# Patient Record
Sex: MALE | Race: WHITE | Employment: FULL TIME | URBAN - METROPOLITAN AREA
[De-identification: names, ages, dates, MRNs, and addresses within clinical notes are randomized per-mention and may not be internally consistent; named-entity substitution may affect disease eponyms.]

---

## 2017-05-10 ENCOUNTER — HOSPITAL ENCOUNTER (OUTPATIENT)
Dept: GENERAL RADIOLOGY | Age: 67
Discharge: HOME | End: 2017-05-10
Payer: MEDICARE

## 2017-05-10 DIAGNOSIS — M54.2 NECK PAIN: ICD-10-CM

## 2017-05-10 PROCEDURE — 72050 X-RAY EXAM NECK SPINE 4/5VWS: CPT

## 2017-05-10 NOTE — PROGRESS NOTES
Please tell the patient that the x-rays show some mild arthritis but no other acute problems. If he continues to have neck pain let me know and I can send him to a spine specialist such as Dr. Alex Michel.   He may be a candidate for some type of injection if the medication I gave him earlier did not help

## 2018-06-12 PROBLEM — E66.01 SEVERE OBESITY (BMI 35.0-39.9): Status: ACTIVE | Noted: 2018-06-12

## 2018-06-18 PROBLEM — K80.20 GALLSTONES: Status: ACTIVE | Noted: 2018-06-18

## 2018-06-22 ENCOUNTER — HOSPITAL ENCOUNTER (OUTPATIENT)
Dept: ULTRASOUND IMAGING | Age: 68
Discharge: HOME | End: 2018-06-22
Attending: SURGERY
Payer: MEDICARE

## 2018-06-22 DIAGNOSIS — R10.11 RUQ PAIN: ICD-10-CM

## 2018-06-22 PROCEDURE — 76700 US EXAM ABDOM COMPLETE: CPT

## 2018-06-27 PROBLEM — K80.10 CHRONIC CALCULOUS CHOLECYSTITIS: Status: ACTIVE | Noted: 2018-06-27

## 2018-06-29 ENCOUNTER — HOSPITAL ENCOUNTER (OUTPATIENT)
Dept: SURGERY | Age: 68
Discharge: HOME | End: 2018-06-29

## 2018-06-29 VITALS
HEIGHT: 72 IN | SYSTOLIC BLOOD PRESSURE: 148 MMHG | WEIGHT: 269 LBS | OXYGEN SATURATION: 94 % | HEART RATE: 77 BPM | RESPIRATION RATE: 18 BRPM | TEMPERATURE: 97.8 F | BODY MASS INDEX: 36.44 KG/M2 | DIASTOLIC BLOOD PRESSURE: 70 MMHG

## 2018-06-29 NOTE — PERIOP NOTES
Patient verified name, , and surgery as listed in [facility]. Patient provided medical/health information and PTA medications to the best of their ability. TYPE  CASE:2  Orders per surgeon: Received and dated 18. Labs per surgeon:cbc,cmp. Results: see [facility] results from 18  Labs per anesthesia protocol: see above. EKG  :  None per protocol    Patient provided with and instructed on education handouts including Guide to Surgery, blood transfusions, pain management, and hand hygiene for the family and community, and [facility] brochure.     hibiclens and instructions given per hospital policy. Instructed patient to continue previous medications as prescribed prior to surgery unless otherwise directed and to take the following medications the day of surgery according to anesthesia guidelines : celexa, metoprolol and prilosec . Instructed patient to hold  the following medications: all vitamins supplements and nsaids. Original medication prescription bottles not visualized during patient appointment. Patient teach back successful and patient demonstrates knowledge of instruction.

## 2018-07-05 ENCOUNTER — ANESTHESIA EVENT (OUTPATIENT)
Dept: SURGERY | Age: 68
End: 2018-07-05
Payer: MEDICARE

## 2018-07-06 ENCOUNTER — HOSPITAL ENCOUNTER (OUTPATIENT)
Age: 68
Setting detail: OUTPATIENT SURGERY
Discharge: HOME | End: 2018-07-06
Attending: SURGERY | Admitting: SURGERY
Payer: MEDICARE

## 2018-07-06 ENCOUNTER — ANESTHESIA (OUTPATIENT)
Dept: SURGERY | Age: 68
End: 2018-07-06
Payer: MEDICARE

## 2018-07-06 VITALS
OXYGEN SATURATION: 91 % | HEART RATE: 66 BPM | RESPIRATION RATE: 21 BRPM | WEIGHT: 261 LBS | DIASTOLIC BLOOD PRESSURE: 83 MMHG | HEIGHT: 72 IN | TEMPERATURE: 98.4 F | SYSTOLIC BLOOD PRESSURE: 154 MMHG | BODY MASS INDEX: 35.35 KG/M2

## 2018-07-06 PROCEDURE — 74011250636 HC RX REV CODE- 250/636

## 2018-07-06 PROCEDURE — 77030008467 HC STPLR SKN COVD -B: Performed by: SURGERY

## 2018-07-06 PROCEDURE — 77030031139 HC SUT VCRL2 J&J -A: Performed by: SURGERY

## 2018-07-06 PROCEDURE — 77030035048 HC TRCR ENDOSC OPTCL COVD -B: Performed by: SURGERY

## 2018-07-06 PROCEDURE — 77030020782 HC GWN BAIR PAWS FLX 3M -B: Performed by: ANESTHESIOLOGY

## 2018-07-06 PROCEDURE — 77030032490 HC SLV COMPR SCD KNE COVD -B: Performed by: SURGERY

## 2018-07-06 PROCEDURE — 77030008703 HC TU ET UNCUF COVD -A: Performed by: ANESTHESIOLOGY

## 2018-07-06 PROCEDURE — 77030035051: Performed by: SURGERY

## 2018-07-06 PROCEDURE — 77030021158 HC TRCR BLN GELPRT AMR -B: Performed by: SURGERY

## 2018-07-06 PROCEDURE — 77030011640 HC PAD GRND REM COVD -A: Performed by: SURGERY

## 2018-07-06 PROCEDURE — 77030000038 HC TIP SCIS LAPSCP SURI -B: Performed by: SURGERY

## 2018-07-06 PROCEDURE — 77030018836 HC SOL IRR NACL ICUM -A: Performed by: SURGERY

## 2018-07-06 PROCEDURE — 74011250636 HC RX REV CODE- 250/636: Performed by: ANESTHESIOLOGY

## 2018-07-06 PROCEDURE — 77030018876 HC APPL CLP LIG4 COVD -B: Performed by: SURGERY

## 2018-07-06 PROCEDURE — 77030035044 HC TRCR ENDOSC VRSPRT BLDLSS COVD -C: Performed by: SURGERY

## 2018-07-06 PROCEDURE — 77030008477 HC STYL SATN SLP COVD -A: Performed by: ANESTHESIOLOGY

## 2018-07-06 PROCEDURE — 74011000250 HC RX REV CODE- 250

## 2018-07-06 PROCEDURE — 74011250637 HC RX REV CODE- 250/637: Performed by: ANESTHESIOLOGY

## 2018-07-06 PROCEDURE — 88304 TISSUE EXAM BY PATHOLOGIST: CPT | Performed by: SURGERY

## 2018-07-06 PROCEDURE — 76010000149 HC OR TIME 1 TO 1.5 HR: Performed by: SURGERY

## 2018-07-06 PROCEDURE — 77030025088 HC APPL CLP LIG 1 COVD -B: Performed by: SURGERY

## 2018-07-06 PROCEDURE — 76210000020 HC REC RM PH II FIRST 0.5 HR: Performed by: SURGERY

## 2018-07-06 PROCEDURE — 76060000033 HC ANESTHESIA 1 TO 1.5 HR: Performed by: SURGERY

## 2018-07-06 PROCEDURE — 76210000016 HC OR PH I REC 1 TO 1.5 HR: Performed by: SURGERY

## 2018-07-06 PROCEDURE — 77030008756 HC TU IRR SUC STRY -B: Performed by: SURGERY

## 2018-07-06 PROCEDURE — 74011250636 HC RX REV CODE- 250/636: Performed by: SURGERY

## 2018-07-06 PROCEDURE — 77030008522 HC TBNG INSUF LAPRO STRY -B: Performed by: SURGERY

## 2018-07-06 PROCEDURE — 74011000250 HC RX REV CODE- 250: Performed by: SURGERY

## 2018-07-06 RX ORDER — SODIUM CHLORIDE 0.9 % (FLUSH) 0.9 %
5-10 SYRINGE (ML) INJECTION AS NEEDED
Status: DISCONTINUED | OUTPATIENT
Start: 2018-07-06 | End: 2018-07-06 | Stop reason: HOSPADM

## 2018-07-06 RX ORDER — CEFAZOLIN SODIUM/WATER 2 G/20 ML
2 SYRINGE (ML) INTRAVENOUS ONCE
Status: COMPLETED | OUTPATIENT
Start: 2018-07-06 | End: 2018-07-06

## 2018-07-06 RX ORDER — MIDAZOLAM HYDROCHLORIDE 1 MG/ML
2 INJECTION, SOLUTION INTRAMUSCULAR; INTRAVENOUS
Status: DISCONTINUED | OUTPATIENT
Start: 2018-07-06 | End: 2018-07-06 | Stop reason: HOSPADM

## 2018-07-06 RX ORDER — GLYCOPYRROLATE 0.2 MG/ML
INJECTION INTRAMUSCULAR; INTRAVENOUS AS NEEDED
Status: DISCONTINUED | OUTPATIENT
Start: 2018-07-06 | End: 2018-07-06 | Stop reason: HOSPADM

## 2018-07-06 RX ORDER — FENTANYL CITRATE 50 UG/ML
100 INJECTION, SOLUTION INTRAMUSCULAR; INTRAVENOUS ONCE
Status: DISCONTINUED | OUTPATIENT
Start: 2018-07-06 | End: 2018-07-06 | Stop reason: HOSPADM

## 2018-07-06 RX ORDER — LIDOCAINE HYDROCHLORIDE 10 MG/ML
0.1 INJECTION INFILTRATION; PERINEURAL AS NEEDED
Status: DISCONTINUED | OUTPATIENT
Start: 2018-07-06 | End: 2018-07-06 | Stop reason: HOSPADM

## 2018-07-06 RX ORDER — ONDANSETRON 2 MG/ML
INJECTION INTRAMUSCULAR; INTRAVENOUS AS NEEDED
Status: DISCONTINUED | OUTPATIENT
Start: 2018-07-06 | End: 2018-07-06 | Stop reason: HOSPADM

## 2018-07-06 RX ORDER — BUPIVACAINE HYDROCHLORIDE 2.5 MG/ML
INJECTION, SOLUTION EPIDURAL; INFILTRATION; INTRACAUDAL AS NEEDED
Status: DISCONTINUED | OUTPATIENT
Start: 2018-07-06 | End: 2018-07-06 | Stop reason: HOSPADM

## 2018-07-06 RX ORDER — ROCURONIUM BROMIDE 10 MG/ML
INJECTION, SOLUTION INTRAVENOUS AS NEEDED
Status: DISCONTINUED | OUTPATIENT
Start: 2018-07-06 | End: 2018-07-06 | Stop reason: HOSPADM

## 2018-07-06 RX ORDER — CEFAZOLIN SODIUM IN 0.9 % NACL 2 G/50 ML
2 INTRAVENOUS SOLUTION, PIGGYBACK (ML) INTRAVENOUS ONCE
Status: CANCELLED | OUTPATIENT
Start: 2018-07-06 | End: 2018-07-06

## 2018-07-06 RX ORDER — FAMOTIDINE 20 MG/1
20 TABLET, FILM COATED ORAL ONCE
Status: COMPLETED | OUTPATIENT
Start: 2018-07-06 | End: 2018-07-06

## 2018-07-06 RX ORDER — NALOXONE HYDROCHLORIDE 0.4 MG/ML
0.1 INJECTION, SOLUTION INTRAMUSCULAR; INTRAVENOUS; SUBCUTANEOUS AS NEEDED
Status: DISCONTINUED | OUTPATIENT
Start: 2018-07-06 | End: 2018-07-06 | Stop reason: HOSPADM

## 2018-07-06 RX ORDER — OXYCODONE HYDROCHLORIDE 5 MG/1
5 TABLET ORAL
Status: DISCONTINUED | OUTPATIENT
Start: 2018-07-06 | End: 2018-07-06 | Stop reason: HOSPADM

## 2018-07-06 RX ORDER — NEOSTIGMINE METHYLSULFATE 1 MG/ML
INJECTION INTRAVENOUS AS NEEDED
Status: DISCONTINUED | OUTPATIENT
Start: 2018-07-06 | End: 2018-07-06 | Stop reason: HOSPADM

## 2018-07-06 RX ORDER — PROPOFOL 10 MG/ML
INJECTION, EMULSION INTRAVENOUS AS NEEDED
Status: DISCONTINUED | OUTPATIENT
Start: 2018-07-06 | End: 2018-07-06 | Stop reason: HOSPADM

## 2018-07-06 RX ORDER — FENTANYL CITRATE 50 UG/ML
INJECTION, SOLUTION INTRAMUSCULAR; INTRAVENOUS AS NEEDED
Status: DISCONTINUED | OUTPATIENT
Start: 2018-07-06 | End: 2018-07-06 | Stop reason: HOSPADM

## 2018-07-06 RX ORDER — SODIUM CHLORIDE 9 MG/ML
25 INJECTION, SOLUTION INTRAVENOUS CONTINUOUS
Status: DISCONTINUED | OUTPATIENT
Start: 2018-07-06 | End: 2018-07-06 | Stop reason: HOSPADM

## 2018-07-06 RX ORDER — HYDROCODONE BITARTRATE AND ACETAMINOPHEN 7.5; 325 MG/1; MG/1
1 TABLET ORAL AS NEEDED
Status: DISCONTINUED | OUTPATIENT
Start: 2018-07-06 | End: 2018-07-06 | Stop reason: HOSPADM

## 2018-07-06 RX ORDER — SODIUM CHLORIDE 0.9 % (FLUSH) 0.9 %
5-10 SYRINGE (ML) INJECTION EVERY 8 HOURS
Status: DISCONTINUED | OUTPATIENT
Start: 2018-07-06 | End: 2018-07-06 | Stop reason: HOSPADM

## 2018-07-06 RX ORDER — EPHEDRINE SULFATE 50 MG/ML
INJECTION, SOLUTION INTRAVENOUS AS NEEDED
Status: DISCONTINUED | OUTPATIENT
Start: 2018-07-06 | End: 2018-07-06 | Stop reason: HOSPADM

## 2018-07-06 RX ORDER — DEXAMETHASONE SODIUM PHOSPHATE 4 MG/ML
INJECTION, SOLUTION INTRA-ARTICULAR; INTRALESIONAL; INTRAMUSCULAR; INTRAVENOUS; SOFT TISSUE AS NEEDED
Status: DISCONTINUED | OUTPATIENT
Start: 2018-07-06 | End: 2018-07-06 | Stop reason: HOSPADM

## 2018-07-06 RX ORDER — CEFAZOLIN SODIUM/WATER 2 G/20 ML
3 SYRINGE (ML) INTRAVENOUS ONCE
Status: DISCONTINUED | OUTPATIENT
Start: 2018-07-06 | End: 2018-07-06

## 2018-07-06 RX ORDER — LIDOCAINE HYDROCHLORIDE 20 MG/ML
INJECTION, SOLUTION EPIDURAL; INFILTRATION; INTRACAUDAL; PERINEURAL AS NEEDED
Status: DISCONTINUED | OUTPATIENT
Start: 2018-07-06 | End: 2018-07-06 | Stop reason: HOSPADM

## 2018-07-06 RX ORDER — HYDROMORPHONE HYDROCHLORIDE 2 MG/ML
0.5 INJECTION, SOLUTION INTRAMUSCULAR; INTRAVENOUS; SUBCUTANEOUS
Status: DISCONTINUED | OUTPATIENT
Start: 2018-07-06 | End: 2018-07-06 | Stop reason: HOSPADM

## 2018-07-06 RX ORDER — SODIUM CHLORIDE, SODIUM LACTATE, POTASSIUM CHLORIDE, CALCIUM CHLORIDE 600; 310; 30; 20 MG/100ML; MG/100ML; MG/100ML; MG/100ML
100 INJECTION, SOLUTION INTRAVENOUS CONTINUOUS
Status: DISCONTINUED | OUTPATIENT
Start: 2018-07-06 | End: 2018-07-06 | Stop reason: HOSPADM

## 2018-07-06 RX ADMIN — Medication 2 G: at 09:45

## 2018-07-06 RX ADMIN — ONDANSETRON 4 MG: 2 INJECTION INTRAMUSCULAR; INTRAVENOUS at 10:00

## 2018-07-06 RX ADMIN — HYDROCODONE BITARTRATE AND ACETAMINOPHEN 1 TABLET: 7.5; 325 TABLET ORAL at 11:09

## 2018-07-06 RX ADMIN — DEXAMETHASONE SODIUM PHOSPHATE 4 MG: 4 INJECTION, SOLUTION INTRA-ARTICULAR; INTRALESIONAL; INTRAMUSCULAR; INTRAVENOUS; SOFT TISSUE at 09:55

## 2018-07-06 RX ADMIN — FENTANYL CITRATE 100 MCG: 50 INJECTION, SOLUTION INTRAMUSCULAR; INTRAVENOUS at 09:37

## 2018-07-06 RX ADMIN — PROPOFOL 150 MG: 10 INJECTION, EMULSION INTRAVENOUS at 09:38

## 2018-07-06 RX ADMIN — GLYCOPYRROLATE 0.4 MG: 0.2 INJECTION INTRAMUSCULAR; INTRAVENOUS at 10:35

## 2018-07-06 RX ADMIN — EPHEDRINE SULFATE 10 MG: 50 INJECTION, SOLUTION INTRAVENOUS at 09:50

## 2018-07-06 RX ADMIN — HYDROMORPHONE HYDROCHLORIDE 0.5 MG: 2 INJECTION, SOLUTION INTRAMUSCULAR; INTRAVENOUS; SUBCUTANEOUS at 11:10

## 2018-07-06 RX ADMIN — NEOSTIGMINE METHYLSULFATE 3 MG: 1 INJECTION INTRAVENOUS at 10:35

## 2018-07-06 RX ADMIN — FAMOTIDINE 20 MG: 20 TABLET ORAL at 08:17

## 2018-07-06 RX ADMIN — SODIUM CHLORIDE, SODIUM LACTATE, POTASSIUM CHLORIDE, AND CALCIUM CHLORIDE 100 ML/HR: 600; 310; 30; 20 INJECTION, SOLUTION INTRAVENOUS at 08:17

## 2018-07-06 RX ADMIN — FENTANYL CITRATE 50 MCG: 50 INJECTION, SOLUTION INTRAMUSCULAR; INTRAVENOUS at 10:04

## 2018-07-06 RX ADMIN — ROCURONIUM BROMIDE 50 MG: 10 INJECTION, SOLUTION INTRAVENOUS at 09:39

## 2018-07-06 RX ADMIN — LIDOCAINE HYDROCHLORIDE 80 MG: 20 INJECTION, SOLUTION EPIDURAL; INFILTRATION; INTRACAUDAL; PERINEURAL at 09:38

## 2018-07-06 NOTE — ANESTHESIA PREPROCEDURE EVALUATION
Anesthetic History               Review of Systems / Medical History  Patient summary reviewed    Pulmonary                   Neuro/Psych              Cardiovascular    Hypertension          CAD (\"minimal\" per patient, cath 12 years ago)  Pertinent negatives: No past MI and angina  Exercise tolerance: >4 METS     GI/Hepatic/Renal     GERD: well controlled           Endo/Other        Obesity     Other Findings            Physical Exam    Airway  Mallampati: III    Neck ROM: decreased range of motion, short neck        Cardiovascular               Dental    Dentition: Poor dentition     Pulmonary                 Abdominal         Other Findings            Anesthetic Plan    ASA: 3  Anesthesia type: general            Anesthetic plan and risks discussed with: Patient

## 2018-07-06 NOTE — ANESTHESIA POSTPROCEDURE EVALUATION
Post-Anesthesia Evaluation and Assessment    Patient: Shikha Ervin MRN: [id number]  SSN: [id number]    YOB: 1950  Age: 75 y.o. Sex: male       Cardiovascular Function/Vital Signs  Visit Vitals    /87    Pulse (!) 54    Temp 36.2 °C (97.1 °F)    Resp 14    Ht 6' (1.829 m)    Wt 118.4 kg (261 lb)    SpO2 94%    BMI 35.4 kg/m2       Patient is status post general anesthesia for Procedure(s):  CHOLECYSTECTOMY LAPAROSCOPIC. Nausea/Vomiting: Mild    Postoperative hydration reviewed and adequate. Pain:  Pain Scale 1: Numeric (0 - 10) (07/06/18 1110)  Pain Intensity 1: 8 (07/06/18 1110)   Managed    Neurological Status:   Neuro (WDL): Within Defined Limits (07/06/18 1100)  Neuro  LUE Motor Response: Purposeful (07/06/18 1100)  LLE Motor Response: Purposeful (07/06/18 1100)  RUE Motor Response: Purposeful (07/06/18 1100)  RLE Motor Response: Purposeful (07/06/18 1100)   At baseline    Mental Status and Level of Consciousness: Alert and oriented     Pulmonary Status:   O2 Device: Nasal cannula (07/06/18 1100)   Adequate oxygenation and airway patent    Complications related to anesthesia: None    Post-anesthesia assessment completed.  No concerns    Signed By: Paul Hensley MD     July 6, 2018

## 2018-07-06 NOTE — OP NOTES
[address] [facility]  [phone]    OPERATVE REPORT    Name: Pamela Cabrera     Date of Surgery: 7/6/2018  Med Record Number: [id number]   Age: 75 y.o. Sex: male   Pre-operative Diagnosis: Chronic Calculous Cholecystitis  Post-operative Diagnosis: same  Procedure: Laparoscopic Cholecystectomy  Surgeon: Miguel Guevara MD  Asistants: none  Anesthesia:  General  Complications: none  Specimen: gallbladder to path  Estimated Blood Loss: <30cc    Procedure Description:   The risks, benefits, potential complications, treatment options, and expected outcomes were discussed with the patient pre-operatively. The patient voiced understanding and gave informed consent preoperatively. The patient was taken to the Operating Room, and the [facility] time-out protocol checklist was followed. After the induction of adequate anesthesia, the abdomen was prepped and draped in the usual sterile fashion. Preoperative antibiotics were given. A sub umbilical incision was made and a cut down approach was used to place a blunt Micah trochar under direct vision. After adequate pneumoperitioneum, two 5mm static and dynamic retraction ports were placed and an 11mm trochar also placed, all in the usual locations. The gallbladder was retracted but it was distended and could not be retracted adequately to visualize the distal aspect of its wall as a result of too much intraabd adipose which required and additional 5mm trochar and liver retractor to hold out of the way for exposure. The gallbladder was fragile as well and we had to put a lot of retraction pressure on it for exposure which resulted in a small disruption of its apex which was controlled with suction irrigation. Despite this, the gallbladder sathin, fragile, and inflammatory adhesions to the inferior aspect of the gallbladder were taken down.   Careful and tedious dissection was performed to free up the cystic duct and artery from the surrounding tissues. A clear window was created between the inferior aspect of the gallbladder wall, the cystic duct, and the cystic artery. The cystic duct could clearly be seen to enter the gallbladder and the cystic artery seen to traverse on the gallbladder wall toward the fundus of the gallbladder. The critical view of safety was achieved. We placed 4 clips on the distal cystic duct (patient side) and 2 clips on the proximal (specimen side) of the cystic duct. We needed a large clip applier because the cystic duct was dilated. We then placed 2 clips on the proximal cystic artery and 2 clips on the distal cystic artery. Both structures were then divided. The cystic artery could be seen to pulsate at its clipped end as usual.  The gallbladder was the removed from its attachments to the liver. Small venous tributaries were clipped as needed as dissection was carried up toward the gallbladder fundus. The gallbladder was retracted out through the umbilical trochar suite with the camera placed temporarily through the epigastric trochar site. The gallbladder fossa was inspected. No bile leaks were seen, the clips on the artery and duct were intact and hemostatsis confirmed. Marcaine was infiltrated into the preperitoneal tissues around each trochar site. The fascia of the umbilical incision was closed with interrupted 0 vicryl suture. Clips were placed to close the skin incisions. The wounds were dressed sterilely with telfa and tegaderm, All sponge, instruments, and needle counts were correct. The patient was taken to recovery in good condition.     Shawn Thrasher MD, FACS

## 2018-07-06 NOTE — H&P (VIEW-ONLY)
.mthop  H&P/Consult Note/Progress Note/Office Note:   Michelle Wells  MRN: [id number]  :1950  Age:72 y.o.    HPI: Michelle Wells is a 75 y.o. male who was referred for evaluation of gallstones. He reports a 3 year h/o of intermittent episodes of RUQ pain which is worse recently. He reports 3 episodes of RUQ pain recently. He is on standing dose of omeprazole for remote history of gastritis and reports this did not alleviate his symptoms or prevent them from happening. Additional antacids are not helping. He denied any associated nausea vomiting fevers or chills. He is not on blood thinners. He had a prior umbilical hernia repair at [facility] in approximately  (He does not now if mesh was used). He reported a history of peptic ulcer disease from age 14. He has seen gastroenterology but does not remember the name of his gastroenterologist.   He reported a prior unremarkable colonoscopy but reports this is more than 5 years ago. He reported a prior CT from approximately  at [facility] which identified gallstones. 16 U/S abd  Hx:  Abd distention, hx gallstones     The liver is slightly echogenic. There are no discrete lesions in the visualized portions of the liver, pancreas, or spleen. There is no bile duct  dilatation. The common bile duct measures 6 mm. There are multiple small stones in the gallbladder. .  There is no significant wall thickening. The gallbladder is not distended.     The right kidney measures 11.7 cm in length. The left kidney measures 12.9 cm. There is no hydronephrosis. There is no evidence of a renal mass. There is no ascites. The aorta and inferior vena cava are normal in caliber.     IMPRESSION: Cholelithiasis      18 RUQ U/S  Hx: RUQ pain, h/o gallstones.     FINDINGS: Pancreas is grossly unremarkable although the tail is partially obscured by overlying bowel gas. Aorta is normal caliber, 2.0 cm. The IVC is patent. The normal sized spleen has a homogenous echotexture and measures 12.3 cm. Left kidney contains a 15 mm simple appearing cyst, otherwise unremarkable without hydronephrosis and measures 12.1 cm. Right kidney is unremarkable without hydronephrosis and measures 12 cm. Renal echogenicity is normal, the right kidney is hypoechoic to the liver. The intrahepatic biliary tree is not dilated. There is hepatopetal flow in the portal vein. No gross focal parenchymal abnormality identified within the liver. The gallbladder wall is thickened, 3 - 6 mm. Multiple small echogenic shadowing mobile gallstones. Negative Izaguirre's sign. The common bile duct is not dilated 6 mm.      IMPRESSION: Cholelithiasis and gallbladder wall thickening. No acute cholecystitis or biliary tree obstruction      Past Medical History:   Diagnosis Date    CAD (coronary artery disease) 12/19/2012    ED (erectile dysfunction) 12/19/2012    GERD (gastroesophageal reflux disease) 12/19/2012    HLD (hyperlipidemia) 12/19/2012    HTN (hypertension) 12/19/2012     No past surgical history on file. Current Outpatient Prescriptions   Medication Sig    citalopram (CELEXA) 20 mg tablet Take 1 Tab by mouth daily.  clonazePAM (KLONOPIN) 1 mg tablet Take 1 Tab by mouth three (3) times daily. Max Daily Amount: 3 mg.  lisinopril-hydroCHLOROthiazide (PRINZIDE, ZESTORETIC) 20-25 mg per tablet Take 1 Tab by mouth daily.  metoprolol succinate (TOPROL-XL) 100 mg tablet Take 1 Tab by mouth daily.  omeprazole (PRILOSEC) 40 mg capsule Take 1 Cap by mouth daily.  tamsulosin (FLOMAX) 0.4 mg capsule Take 1 Cap by mouth daily.  simvastatin (ZOCOR) 40 mg tablet Take 1 Tab by mouth nightly. No current facility-administered medications for this visit. Review of patient's allergies indicates no known allergies.   Social History     Social History    Marital status:      Spouse name: N/A    Number of children: N/A    Years of education: N/A     Social History Main Topics    Smoking status: Never Smoker    Smokeless tobacco: Never Used    Alcohol use No    Drug use: No    Sexual activity: Not Asked     Other Topics Concern    None     Social History Narrative     History   Smoking Status    Never Smoker   Smokeless Tobacco    Never Used     Family History   Problem Relation Age of Onset    Hypertension Mother     Cancer Father      Skin    Diabetes Other      Cousin     ROS: The patient has no difficulty with chest pain or shortness of breath. No fever or chills. Comprehensive review of systems was otherwise unremarkable except as noted above. Physical Exam:   There were no vitals taken for this visit. Constitutional: Alert, oriented, cooperative patient in no acute distress; appears stated age    Eyes:Sclera are clear. EOMs intact  ENMT: no external lesions gross hearing normal; no obvious neck masses, no ear or lip lesions, nares normal  CV: RRR. Normal perfusion  Resp: No JVD. Breathing is  non-labored; no audible wheezing. GI: soft and non-distended; obese     Musculoskeletal: unremarkable with normal function. No embolic signs or cyanosis. Neuro:  Oriented; moves all 4; no focal deficits  Psychiatric: normal affect and mood, no memory impairment    Recent vitals (if inpt):  @IPVITALS(24:)@    Labs:  No results for input(s): WBC, HGB, PLT, NA, K, CL, CO2, BUN, CREA, GLU, PTP, INR, APTT, TBIL, TBILI, CBIL, SGOT, GPT, ALT, AP, AML, LPSE, LCAD, NH4, TROPT, TROIQ, PCO2, PO2, HCO3, HGBEXT, PLTEXT, HGBEXT, PLTEXT in the last 72 hours.     No lab exists for component:  PH, INREXT, INREXT    Lab Results   Component Value Date/Time    WBC 8.7 06/12/2018 02:03 PM    HGB 16.9 06/12/2018 02:03 PM    PLATELET 910 01/30/4242 02:03 PM    Sodium 138 06/12/2018 02:03 PM    Potassium 4.4 06/12/2018 02:03 PM    Chloride 99 06/12/2018 02:03 PM    CO2 24 06/12/2018 02:03 PM    BUN 18 06/12/2018 02:03 PM    Creatinine 0.91 06/12/2018 02:03 PM    Glucose 89 06/12/2018 02:03 PM    Bilirubin, total 0.6 06/12/2018 02:03 PM    AST (SGOT) 21 06/12/2018 02:03 PM    ALT (SGPT) 23 06/12/2018 02:03 PM    Alk. phosphatase 78 06/12/2018 02:03 PM       I reviewed recent labs and recent radiologic studies. I independently reviewed radiology images for studies I described above or studies I have ordered. Admission date (for inpatients): (Not on file)   [unfilled]  [unfilled]    ASSESSMENT/PLAN:  Problem List  Date Reviewed: 6/26/2018          Codes Class Noted    Gallstones ICD-10-CM: K80.20  ICD-9-CM: 574.20  6/18/2018        Severe obesity (BMI 35.0-39.9) ([facility] [address] ICD-10-CM: E66.01  ICD-9-CM: 278.01  6/12/2018        HLD (hyperlipidemia) ICD-10-CM: E78.5  ICD-9-CM: 272.4  12/19/2012        HTN (hypertension) ICD-10-CM: I10  ICD-9-CM: 401.9  12/19/2012        CAD (coronary artery disease) ICD-10-CM: I25.10  ICD-9-CM: 414.00  12/19/2012        ED (erectile dysfunction) ICD-10-CM: N52.9  ICD-9-CM: 607.84  12/19/2012        GERD (gastroesophageal reflux disease) ICD-10-CM: K21.9  ICD-9-CM: 530.81  12/19/2012            Active Problems:    * No active hospital problems. *       I recommended weight loss for BMI >36    He has signs/symptoms c/w chronic calculous cholecystitis. I discussed the patient's condition with the patient at length and treatment options. I discussed risks of surgery (laparoscopic cholecystectomy) in language the patient could understand   including bleeding, infection, bile leak, injury to bowel or bile ducts, blood clots, risks of anesthesia, etc.... The patient voiced understanding of all this and all questions were answered. Alternatives to surgery were discussed also and risks of the alternatives. The patient requested that we proceed with surgery. Informed consent was obtained. .      We will schedule lap cholecystectomy for him soon. Request Nguyen Op Note - Is there mesh?       Signed:  Todd Lino MD,  FACS

## 2018-07-06 NOTE — IP AVS SNAPSHOT
[address] [address] 
[phone] Patient: Polina Jeffrey MRN: [id number] :1950 About your hospitalization You were admitted on:  2018 You last received care in the:  [facility] PACU You were discharged on:  2018 Why you were hospitalized Your primary diagnosis was:  Not on File Follow-up Information Follow up With Details Comments Contact Info Stacy Rodriguez MD   [address] 
[phone] Tasha Marie MD Follow up on 2018 10 am [address] 
[address] [facility] [address] 
[phone] Your Scheduled Appointments 2018 10:00 AM EDT Nurse Visit with [facility] NURSE ONLY [facility] ([facility]) Britney Danielle [address] [place] [address] [facility]  
[phone] 2018 11:15 AM EDT Global Post Op with Tasha Marie MD  
[facility] ([facility]) Britney Danielle [address] [place] [address] [facility]  
[phone] Discharge Orders None A check kayley indicates which time of day the medication should be taken. My Medications CHANGE how you take these medications Instructions Each Dose to Equal  
 Morning Noon Evening Bedtime  
 metoprolol succinate 100 mg tablet Commonly known as:  TOPROL-XL What changed:  when to take this Your last dose was: Your next dose is: Take 1 Tab by mouth daily. 100 mg CONTINUE taking these medications Instructions Each Dose to Equal  
 Morning Noon Evening Bedtime  
 citalopram 20 mg tablet Commonly known as:  Tyra Bongo Your last dose was: Your next dose is: Take 1 Tab by mouth daily. 20 mg  
    
   
   
   
  
 clonazePAM 1 mg tablet Commonly known as:  Marco A Metcalf  
   
 Your last dose was: Your next dose is: Take 1 Tab by mouth three (3) times daily. Max Daily Amount: 3 mg.  
 1 mg  
    
   
   
   
  
 lisinopril-hydroCHLOROthiazide 20-25 mg per tablet Commonly known as:  Osmar Harsh Your last dose was: Your next dose is: Take 1 Tab by mouth daily. 1 Tab  
    
   
   
   
  
 omeprazole 40 mg capsule Commonly known as:  PRILOSEC Your last dose was: Your next dose is: Take 1 Cap by mouth daily. 40 mg  
    
   
   
   
  
 simvastatin 40 mg tablet Commonly known as:  ZOCOR Your last dose was: Your next dose is: Take 1 Tab by mouth nightly. 40 mg Discharge Instructions Leave dressings 6-7 days. Then remove, but keep incisions covered daily until follow-up. Try to keep incisions as dry as possible to lower risk of infection. No heavy lifting (>5lbs) for 6 weeks to reduce risk of developing a hernia in the incisions. No driving until you are off pain meds for 24hrs and have no pain with movements associated with driving. Pain prescription (Percocet) on chart for patient to use as needed for pain Follow-up with Dr Jean Juárez nurse on 10 days on a Monday to have skin clips out and steri strips placed, and then see Dr Ivet Che 1-2 weeks after that (both visits) in the office at: 
[facility] [address], [address] 
[phone] Appointments:  
July 9, 2018 at 10am 
July 23, 2018 at 11:15 Cholecystectomy: What to Expect at [facility] Your Recovery After your surgery, it is normal to feel weak and tired for several days after you return home. Your belly may be swollen. Since you had laparoscopic surgery, you may also have pain in your shoulder for about 24 hours. You may have gas or need to burp a lot at first, and a few people get diarrhea.  The diarrhea usually goes away in 2 to 4 weeks, but it may last longer. For a laparoscopic surgery, most people can go back to work or their normal routine in 1 to 2 weeks, but it may take longer, depending on the type of work you do. This care sheet gives you a general idea about how long it will take for you to recover. However, each person recovers at a different pace. Follow the steps below to get better as quickly as possible. How can you care for yourself at home? Activity · Rest when you feel tired. Getting enough sleep will help you recover. · Try to walk each day. Start out by walking a little more than you did the day before. Gradually increase the amount you walk. Walking boosts blood flow and helps prevent pneumonia and constipation. · For about 2 to 4 weeks, avoid lifting anything that would make you strain or anything over 10 pounds. · Avoid strenuous activities, such as biking, jogging, weightlifting, and aerobic exercise, until your doctor says it is okay. · You may shower 24 hours after surgery, if your doctor okays it. Pat the cut (incision) dry. Do not take a bath until your doctor tells you it is okay. · You may drive when you are no longer taking pain medicine and can quickly move your foot from the gas pedal to the brake. You must also be able to sit comfortably for a long period of time, even if you do not plan to go far. You might get caught in traffic. · Your doctor will tell you when you can have sex again. Diet · Eat smaller meals more often instead of fewer larger meals. You can eat a normal diet, but avoid eating fatty foods for about 1 month. Fatty foods include hamburger, whole milk, cheese, and many snack foods. If your stomach is upset, try bland, low-fat foods like plain rice, broiled chicken, toast, and yogurt. · Drink plenty of fluids (unless your doctor tells you not to). · If you have diarrhea, try avoiding spicy foods, dairy products, fatty foods, and alcohol.  You can also watch to see if specific foods cause it, and stop eating them. If the diarrhea continues for more than 2 weeks, talk to your doctor. · You may notice that your bowel movements are not regular right after your surgery. This is common. Try to avoid constipation and straining with bowel movements. You may want to take a fiber supplement every day. If you have not had a bowel movement after a couple of days, ask your doctor about taking a mild laxative. Medicines · Take pain medicines exactly as directed. ¨ If the doctor gave you a prescription medicine for pain, take it as prescribed. ¨ If you are not taking a prescription pain medicine, take an over-the-counter medicine such as acetaminophen (Tylenol), ibuprofen (Advil, Motrin), or naproxen (Aleve). Read and follow all instructions on the label. ¨ Do not take two or more pain medicines at the same time unless the doctor told you to. Many pain medicines contain acetaminophen, which is Tylenol. Too much Tylenol can be harmful. · If you think your pain medicine is making you sick to your stomach: 
¨ Take your medicine after meals (unless your doctor tells you not to). ¨ Ask your doctor for a different pain medicine. · If your doctor prescribed antibiotics, take them as directed. Do not stop taking them just because you feel better. You need to take the full course of antibiotics. Incision care · If you have strips of tape or glue on the incision, or cut, leave the tape/glue on for a week or until it falls off. · After 24 hours wash the area daily with warm, soapy water, and pat it dry. · You may have staples to hold the cut together. Keep them dry until your doctor takes them out. This is usually in 7 to 10 days. · Keep the area clean and dry. You may cover it with a gauze bandage if it weeps or rubs against clothing. Change the bandage every day. Ice · To reduce swelling and pain, put ice or a cold pack on your belly for 10 to 20 minutes at a time. Do this every 1 to 2 hours.  Put a thin cloth between the ice and your skin. Follow-up care is a key part of your treatment and safety. Be sure to make and go to all appointments, and call your doctor if you are having problems. Its also a good idea to know your test results and keep a list of the medicines you take. When should you call for help? Call 911 anytime you think you may need emergency care. For example, call if: 
· You passed out (lost consciousness). · You have severe trouble breathing. · You have sudden chest pain and shortness of breath, or you cough up blood. Call your doctor now or seek immediate medical care if: 
· You are sick to your stomach and cannot drink fluids. · You have pain that does not get better when you take your pain medicine. · You have signs of infection, such as: 
¨ Increased pain, swelling, warmth, or redness. ¨ Red streaks leading from the incision. ¨ Pus draining from the incision. ¨ Swollen lymph nodes in your neck, armpits, or groin. ¨ A fever. · Your urine turns dark brown or your stool is light-colored or emigdio-colored. · Your skin or the whites of your eyes turn yellow. · Bright red blood has soaked through a large bandage over your incision. · You have signs of a blood clot, such as: 
¨ Pain in your calf, back of knee, thigh, or groin. ¨ Redness and swelling in your leg or groin. · You have trouble passing urine or stool, especially if you have mild pain or swelling in your lower belly. · You had a laparoscopic surgery and your shoulder pain lasts more than 24 hours or if you do not have a bowel movement after taking a laxative. After general anesthesia or intravenous sedation, for 24 hours or while taking prescription Narcotics: · Limit your activities · Do not drive and operate hazardous machinery · Do not make important personal or business decisions · Do  not drink alcoholic beverages · If you have not urinated within 8 hours after discharge, please contact your surgeon on call. *  Please give a list of your current medications to your Primary Care Provider. *  Please update this list whenever your medications are discontinued, doses are 
    changed, or new medications (including over-the-counter products) are added. *  Please carry medication information at all times in case of emergency situations. These are general instructions for a healthy lifestyle: No smoking/ No tobacco products/ Avoid exposure to second hand smoke Surgeon General's Warning:  Quitting smoking now greatly reduces serious risk to your health. Obesity, smoking, and sedentary lifestyle greatly increases your risk for illness A healthy diet, regular physical exercise & weight monitoring are important for maintaining a healthy lifestyle You may be retaining fluid if you have a history of heart failure or if you experience any of the following symptoms:  Weight gain of 3 pounds or more overnight or 5 pounds in a week, increased swelling in our hands or feet or shortness of breath while lying flat in bed. Please call your doctor as soon as you notice any of these symptoms; do not wait until your next office visit. Recognize signs and symptoms of STROKE: 
F-face looks uneven A-arms unable to move or move unevenly S-speech slurred or non-existent T-time-call 911 as soon as signs and symptoms begin-DO NOT go Back to bed or wait to see if you get better-TIME IS BRAIN. ACO Transitions of Care Introducing [employer] [address] offers a voluntary care coordination program to provide high quality service and care to [facility] fee-for-service beneficiaries. Brenda Christiansen was designed to help you enhance your health and well-being through the following services: ? Transitions of Care  support for individuals who are transitioning from one care setting to another (example: Hospital to home). ? Chronic and Complex Care Coordination  support for individuals and caregivers of those with serious or chronic illnesses or with more than one chronic (ongoing) condition and those who take a number of different medications. If you meet specific medical criteria, a [facility] [address] may call you directly to coordinate your care with your primary care physician and your other care providers. For questions about the [facility] programs, please, contact your physicians office. For general questions or additional information about Accountable Care Organizations: 
Please visit www.medicare.gov/acos. html or call 1-800-MEDICARE (4-238.175.3442) TTY users should call 8-389.442.3028. Introducing [facility] HE[facility]! Dear Erica Kohli: Thank you for requesting a Sonos account. Our records indicate that you already have an active Sonos account. You can access your account anytime at [email or web address]. [email or web address]/[email or web address] Did you know that you can access your hospital and ER discharge instructions at any time in Sonos? You can also review all of your test results from your hospital stay or ER visit. Additional Information If you have questions, please visit the Frequently Asked Questions section of the Sonos website at [email or web address] Remember, Sonos is NOT to be used for urgent needs. For medical emergencies, dial 911. Now available from your iPhone and Android! Introducing Paco Saldaña As a [employer] patient, I wanted to make you aware of our electronic visit tool called Paco Saldaña. [employer] 24/7 allows you to connect within minutes with a medical provider 24 hours a day, seven days a week via a mobile device or tablet or logging into a secure website from your computer. You can access Paco Saldaña from anywhere in the [place]. A virtual visit might be right for you when you have a simple condition and feel like you just dont want to get out of bed, or cant get away from work for an appointment, when your regular Lory Mccrary provider is not available (evenings, weekends or holidays), or when youre out of town and need minor care. Electronic visits cost only $49 and if the oLry Mccrary 24/7 provider determines a prescription is needed to treat your condition, one can be electronically transmitted to a nearby pharmacy*. Please take a moment to enroll today if you have not already done so. The enrollment process is free and takes just a few minutes. To enroll, please download the [employer] 24/7 gaetano to your tablet or phone, or visit [email or web address] to enroll on your computer. And, as an [address] [place] [address] patient with a [employer] account, the results of your visits will be scanned into your electronic medical record and your primary care provider will be able to view the scanned results. We urge you to continue to see your regular Lory Mccrary provider for your ongoing medical care. And while your primary care provider may not be the one available when you seek a Paco Saldaña virtual visit, the peace of mind you get from getting a real diagnosis real time can be priceless. For more information on Paco Saldaña, view our Frequently Asked Questions (FAQs) at [email or web address]. Sincerely, 
 
Amol Sarmiento MD 
Chief Medical Officer [employer] *:  certain medications cannot be prescribed via Paco Saldaña Providers Seen During Your Hospitalization Provider Specialty Primary office phone Tasha Marie MD General Surgery [phone] Your Primary Care Physician (PCP) Primary Care Physician Office Phone Office Fax [place] [phone] You are allergic to the following No active allergies Recent Documentation Height Weight BMI Smoking Status 1.829 m 118.4 kg 35.4 kg/m2 Never Smoker Emergency Contacts Name Discharge Info Relation Home Work Mobile Chelsea Paniagua  Spouse [3] [phone] Brie Wilson  Child [2] [phone] Patient Belongings The following personal items are in your possession at time of discharge: 
  Dental Appliances: None  Visual Aid: Glasses      Home Medications: None   Jewelry: None  Clothing: Shirt, Pants, Footwear    Other Valuables: Eyeglasses Please provide this summary of care documentation to your next provider. Signatures-by signing, you are acknowledging that this After Visit Summary has been reviewed with you and you have received a copy. Patient Signature:  ____________________________________________________________ Date:  ____________________________________________________________  
  
Fortino Perez Provider Signature:  ____________________________________________________________ Date:  ____________________________________________________________

## 2018-07-06 NOTE — DISCHARGE INSTRUCTIONS
Leave dressings 6-7 days. Then remove, but keep incisions covered daily until follow-up. Try to keep incisions as dry as possible to lower risk of infection. No heavy lifting (>5lbs) for 6 weeks to reduce risk of developing a hernia in the incisions. No driving until you are off pain meds for 24hrs and have no pain with movements associated with driving. Pain prescription (Percocet) on chart for patient to use as needed for pain  Follow-up with Dr Enriqueta Marquez nurse on 10 days on a Monday to have skin clips out and steri strips placed, and then see Dr Liban Rodriguez 1-2 weeks after that (both visits) in the office at:  [facility] , [address]  [phone]    Appointments:   July 9, 2018 at 10am  July 23, 2018 at 11:15    Cholecystectomy: What to Expect at [address] [place] [address]  After your surgery, it is normal to feel weak and tired for several days after you return home. Your belly may be swollen. Since you had laparoscopic surgery, you may also have pain in your shoulder for about 24 hours. You may have gas or need to burp a lot at first, and a few people get diarrhea. The diarrhea usually goes away in 2 to 4 weeks, but it may last longer. For a laparoscopic surgery, most people can go back to work or their normal routine in 1 to 2 weeks, but it may take longer, depending on the type of work you do. This care sheet gives you a general idea about how long it will take for you to recover. However, each person recovers at a different pace. Follow the steps below to get better as quickly as possible. How can you care for yourself at home? Activity  · Rest when you feel tired. Getting enough sleep will help you recover. · Try to walk each day. Start out by walking a little more than you did the day before. Gradually increase the amount you walk. Walking boosts blood flow and helps prevent pneumonia and constipation.   · For about 2 to 4 weeks, avoid lifting anything that would make you strain or anything over 10 pounds. · Avoid strenuous activities, such as biking, jogging, weightlifting, and aerobic exercise, until your doctor says it is okay. · You may shower 24 hours after surgery, if your doctor okays it. Pat the cut (incision) dry. Do not take a bath until your doctor tells you it is okay. · You may drive when you are no longer taking pain medicine and can quickly move your foot from the gas pedal to the brake. You must also be able to sit comfortably for a long period of time, even if you do not plan to go far. You might get caught in traffic. · Your doctor will tell you when you can have sex again. Diet  · Eat smaller meals more often instead of fewer larger meals. You can eat a normal diet, but avoid eating fatty foods for about 1 month. Fatty foods include hamburger, whole milk, cheese, and many snack foods. If your stomach is upset, try bland, low-fat foods like plain rice, broiled chicken, toast, and yogurt. · Drink plenty of fluids (unless your doctor tells you not to). · If you have diarrhea, try avoiding spicy foods, dairy products, fatty foods, and alcohol. You can also watch to see if specific foods cause it, and stop eating them. If the diarrhea continues for more than 2 weeks, talk to your doctor. · You may notice that your bowel movements are not regular right after your surgery. This is common. Try to avoid constipation and straining with bowel movements. You may want to take a fiber supplement every day. If you have not had a bowel movement after a couple of days, ask your doctor about taking a mild laxative. Medicines  · Take pain medicines exactly as directed. ¨ If the doctor gave you a prescription medicine for pain, take it as prescribed. ¨ If you are not taking a prescription pain medicine, take an over-the-counter medicine such as acetaminophen (Tylenol), ibuprofen (Advil, Motrin), or naproxen (Aleve). Read and follow all instructions on the label.   ¨ Do not take two or more pain medicines at the same time unless the doctor told you to. Many pain medicines contain acetaminophen, which is Tylenol. Too much Tylenol can be harmful. · If you think your pain medicine is making you sick to your stomach:  ¨ Take your medicine after meals (unless your doctor tells you not to). ¨ Ask your doctor for a different pain medicine. · If your doctor prescribed antibiotics, take them as directed. Do not stop taking them just because you feel better. You need to take the full course of antibiotics. Incision care  · If you have strips of tape or glue on the incision, or cut, leave the tape/glue on for a week or until it falls off. · After 24 hours wash the area daily with warm, soapy water, and pat it dry. · You may have staples to hold the cut together. Keep them dry until your doctor takes them out. This is usually in 7 to 10 days. · Keep the area clean and dry. You may cover it with a gauze bandage if it weeps or rubs against clothing. Change the bandage every day. Ice   · To reduce swelling and pain, put ice or a cold pack on your belly for 10 to 20 minutes at a time. Do this every 1 to 2 hours. Put a thin cloth between the ice and your skin. Follow-up care is a key part of your treatment and safety. Be sure to make and go to all appointments, and call your doctor if you are having problems. Its also a good idea to know your test results and keep a list of the medicines you take. When should you call for help? Call 911 anytime you think you may need emergency care. For example, call if:  · You passed out (lost consciousness). · You have severe trouble breathing. · You have sudden chest pain and shortness of breath, or you cough up blood. Call your doctor now or seek immediate medical care if:  · You are sick to your stomach and cannot drink fluids. · You have pain that does not get better when you take your pain medicine.   · You have signs of infection, such as:  ¨ Increased pain, swelling, warmth, or redness. ¨ Red streaks leading from the incision. ¨ Pus draining from the incision. ¨ Swollen lymph nodes in your neck, armpits, or groin. ¨ A fever. · Your urine turns dark brown or your stool is light-colored or emigdio-colored. · Your skin or the whites of your eyes turn yellow. · Bright red blood has soaked through a large bandage over your incision. · You have signs of a blood clot, such as:  ¨ Pain in your calf, back of knee, thigh, or groin. ¨ Redness and swelling in your leg or groin. · You have trouble passing urine or stool, especially if you have mild pain or swelling in your lower belly. · You had a laparoscopic surgery and your shoulder pain lasts more than 24 hours or if you do not have a bowel movement after taking a laxative. After general anesthesia or intravenous sedation, for 24 hours or while taking prescription Narcotics:  · Limit your activities  · Do not drive and operate hazardous machinery  · Do not make important personal or business decisions  · Do  not drink alcoholic beverages  · If you have not urinated within 8 hours after discharge, please contact your surgeon on call. *  Please give a list of your current medications to your Primary Care Provider. *  Please update this list whenever your medications are discontinued, doses are      changed, or new medications (including over-the-counter products) are added. *  Please carry medication information at all times in case of emergency situations. These are general instructions for a healthy lifestyle:  No smoking/ No tobacco products/ Avoid exposure to second hand smoke  Surgeon General's Warning:  Quitting smoking now greatly reduces serious risk to your health.   Obesity, smoking, and sedentary lifestyle greatly increases your risk for illness  A healthy diet, regular physical exercise & weight monitoring are important for maintaining a healthy lifestyle    You may be retaining fluid if you have a history of heart failure or if you experience any of the following symptoms:  Weight gain of 3 pounds or more overnight or 5 pounds in a week, increased swelling in our hands or feet or shortness of breath while lying flat in bed. Please call your doctor as soon as you notice any of these symptoms; do not wait until your next office visit. Recognize signs and symptoms of STROKE:  F-face looks uneven  A-arms unable to move or move unevenly  S-speech slurred or non-existent  T-time-call 911 as soon as signs and symptoms begin-DO NOT go       Back to bed or wait to see if you get better-TIME IS BRAIN.

## 2018-07-06 NOTE — INTERVAL H&P NOTE
H&P Update:  Polina Jeffrey was seen and examined. History and physical has been reviewed. The patient has been examined.  There have been no significant clinical changes since the completion of the originally dated History and Physical.    Signed By: Tasha Marie MD     July 6, 2018 7:57 AM

## 2018-07-23 PROBLEM — K80.20 GALLSTONES: Status: RESOLVED | Noted: 2018-06-18 | Resolved: 2018-07-23

## 2018-09-16 ENCOUNTER — HOSPITAL ENCOUNTER (INPATIENT)
Age: 68
LOS: 4 days | Discharge: HOME | DRG: 445 | End: 2018-09-20
Attending: SURGERY | Admitting: SURGERY
Payer: MEDICARE

## 2018-09-16 DIAGNOSIS — E80.6 HYPERBILIRUBINEMIA: Primary | ICD-10-CM

## 2018-09-16 PROCEDURE — 99283 EMERGENCY DEPT VISIT LOW MDM: CPT | Performed by: SURGERY

## 2018-09-16 PROCEDURE — 74011250636 HC RX REV CODE- 250/636: Performed by: SURGERY

## 2018-09-16 PROCEDURE — 65270000029 HC RM PRIVATE

## 2018-09-16 PROCEDURE — 74011000258 HC RX REV CODE- 258: Performed by: SURGERY

## 2018-09-16 PROCEDURE — 77030027138 HC INCENT SPIROMETER -A

## 2018-09-16 RX ORDER — NALOXONE HYDROCHLORIDE 0.4 MG/ML
0.4 INJECTION, SOLUTION INTRAMUSCULAR; INTRAVENOUS; SUBCUTANEOUS AS NEEDED
Status: DISCONTINUED | OUTPATIENT
Start: 2018-09-16 | End: 2018-09-20 | Stop reason: HOSPADM

## 2018-09-16 RX ORDER — SODIUM CHLORIDE 0.9 % (FLUSH) 0.9 %
5-10 SYRINGE (ML) INJECTION EVERY 8 HOURS
Status: DISCONTINUED | OUTPATIENT
Start: 2018-09-16 | End: 2018-09-20 | Stop reason: HOSPADM

## 2018-09-16 RX ORDER — TAMSULOSIN HYDROCHLORIDE 0.4 MG/1
0.4 CAPSULE ORAL DAILY
COMMUNITY
End: 2019-04-26 | Stop reason: SDUPTHER

## 2018-09-16 RX ORDER — HYDROMORPHONE HYDROCHLORIDE 1 MG/ML
0.5 INJECTION, SOLUTION INTRAMUSCULAR; INTRAVENOUS; SUBCUTANEOUS
Status: DISCONTINUED | OUTPATIENT
Start: 2018-09-16 | End: 2018-09-20 | Stop reason: HOSPADM

## 2018-09-16 RX ORDER — SODIUM CHLORIDE 0.9 % (FLUSH) 0.9 %
5-10 SYRINGE (ML) INJECTION AS NEEDED
Status: DISCONTINUED | OUTPATIENT
Start: 2018-09-16 | End: 2018-09-20 | Stop reason: HOSPADM

## 2018-09-16 RX ORDER — OXYCODONE AND ACETAMINOPHEN 5; 325 MG/1; MG/1
1 TABLET ORAL
Status: DISCONTINUED | OUTPATIENT
Start: 2018-09-16 | End: 2018-09-20 | Stop reason: HOSPADM

## 2018-09-16 RX ORDER — ONDANSETRON 2 MG/ML
4 INJECTION INTRAMUSCULAR; INTRAVENOUS
Status: DISCONTINUED | OUTPATIENT
Start: 2018-09-16 | End: 2018-09-20 | Stop reason: HOSPADM

## 2018-09-16 RX ORDER — ASPIRIN 81 MG/1
81 TABLET ORAL
COMMUNITY

## 2018-09-16 RX ORDER — ACETAMINOPHEN 325 MG/1
650 TABLET ORAL
Status: DISCONTINUED | OUTPATIENT
Start: 2018-09-16 | End: 2018-09-20 | Stop reason: HOSPADM

## 2018-09-16 RX ORDER — DEXTROSE, SODIUM CHLORIDE, AND POTASSIUM CHLORIDE 5; .45; .15 G/100ML; G/100ML; G/100ML
100 INJECTION INTRAVENOUS CONTINUOUS
Status: DISCONTINUED | OUTPATIENT
Start: 2018-09-16 | End: 2018-09-19

## 2018-09-16 RX ORDER — DIPHENHYDRAMINE HYDROCHLORIDE 50 MG/ML
12.5 INJECTION, SOLUTION INTRAMUSCULAR; INTRAVENOUS
Status: DISCONTINUED | OUTPATIENT
Start: 2018-09-16 | End: 2018-09-20 | Stop reason: HOSPADM

## 2018-09-16 RX ADMIN — PIPERACILLIN SODIUM,TAZOBACTAM SODIUM 3.38 G: 3; .375 INJECTION, POWDER, FOR SOLUTION INTRAVENOUS at 23:33

## 2018-09-16 RX ADMIN — DEXTROSE MONOHYDRATE, SODIUM CHLORIDE, AND POTASSIUM CHLORIDE 100 ML/HR: 50; 4.5; 1.49 INJECTION, SOLUTION INTRAVENOUS at 23:26

## 2018-09-16 RX ADMIN — Medication 10 ML: at 23:35

## 2018-09-16 NOTE — IP AVS SNAPSHOT
[address] [place] [address] 
[phone] Patient: Jud Carter MRN: [id number] :1950 About your hospitalization You were admitted on:  2018 You last received care in the:  [facility] 2 SURGICAL You were discharged on:  2018 Why you were hospitalized Your primary diagnosis was:  Biliary Obstruction Your diagnoses also included:  Hyperbilirubinemia, Abdominal Pain, Elevated Liver Enzymes, Cad (Coronary Artery Disease), Htn (Hypertension), Severe Obesity (Bmi 35.0-39.9) (Hcc), Gerd (Gastroesophageal Reflux Disease), Hld (Hyperlipidemia), Parveen (Acute Kidney Injury) (Hcc), Leukocytosis, Cholangitis Follow-up Information Follow up With Details Comments Contact Info Ana Cristina Carter MD On 2018 9:45 [place] Julissa Gallegose Clementbridgett [place]man 
[phone] Bhupinder Cheng MD  AS SCHEDULED [facility] [address] [place] [address] [address] [facility] [address] 
[phone] Your Scheduled Appointments 2018  9:45 AM EDT Extended Office Visit with Ana Cristina Carter MD  
[facility] ([address] [facility]) Ned Del[place] [phone] Discharge Orders None A check kayley indicates which time of day the medication should be taken. My Medications START taking these medications Instructions Each Dose to Equal  
 Morning Noon Evening Bedtime  
 ciprofloxacin HCl 500 mg tablet Commonly known as:  CIPRO Your next dose is: This evening Take 1 Tab by mouth every twelve (12) hours for 6 days. 500 mg  
    
  
   
   
  
   
  
 magic mouthwash Susp Commonly known as:  [place] Your next dose is: Take on as needed schedule 5 mL by Swish and Spit route every four (4) hours as needed. 5 mL CHANGE how you take these medications Instructions Each Dose to Equal  
 Morning Noon Evening Bedtime  
 clonazePAM 1 mg tablet Commonly known as:  Elias Valera What changed:   
- when to take this 
- additional instructions Your next dose is:  Resume home schedule Take 1 Tab by mouth three (3) times daily. Max Daily Amount: 3 mg.  
 1 mg CONTINUE taking these medications Instructions Each Dose to Equal  
 Morning Noon Evening Bedtime  
 aspirin delayed-release 81 mg tablet Your next dose is: Take tonight Take 81 mg by mouth nightly. 81 mg  
    
   
   
   
  
  
 citalopram 20 mg tablet Commonly known as:  Ledon Briseyda Your next dose is:  Tomorrow Morning Take 1 Tab by mouth daily. 20 mg  
    
  
   
   
   
  
 lisinopril-hydroCHLOROthiazide 20-25 mg per tablet Commonly known as:  Barb Ed Your next dose is:  Tomorrow Morning Take 1 Tab by mouth daily. 1 Tab  
    
  
   
   
   
  
 omeprazole 40 mg capsule Commonly known as:  PRILOSEC Your next dose is:  Tomorrow Morning Take 1 Cap by mouth daily. 40 mg  
    
  
   
   
   
  
 simvastatin 40 mg tablet Commonly known as:  ZOCOR Your next dose is: Take tonight Take 1 Tab by mouth nightly. 40 mg  
    
   
   
   
  
  
 tamsulosin 0.4 mg capsule Commonly known as:  FLOMAX Your next dose is:  Tomorrow Morning Take 0.4 mg by mouth daily. 0.4 mg  
    
  
   
   
   
  
  
STOP taking these medications   
 metoprolol succinate 100 mg tablet Commonly known as:  TOPROL-XL Where to Get Your Medications Information on where to get these meds will be given to you by the nurse or doctor. ! Ask your nurse or doctor about these medications  
  ciprofloxacin HCl 500 mg tablet  
 magic mouthwash Susp Discharge Instructions Cholangitis: Care Instructions Your Care Instructions Cholangitis (say \"koh-amadou-JY-tus\") is an infection in the tubes that carry bile from the liver to the gallbladder and the small intestine. The gallbladder stores bile, which helps the body digest food. Sometimes a gallstone gets stuck in the tubes, and bile cannot get out. This can lead to an infection. If the infection is not treated, it may damage your liver or spread through your blood vessels. Other problems also can cause a blockage of the bile tubes and lead to cholangitis. You will take antibiotics to treat the infection. You may also need a special test to look for and remove a gallstone stuck in the bile tubes. When the infection is gone, you may need surgery to take out your gallbladder. This will prevent more gallstones and another infection. Follow-up care is a key part of your treatment and safety. Be sure to make and go to all appointments, and call your doctor if you are having problems. It's also a good idea to know your test results and keep a list of the medicines you take. How can you care for yourself at home? · Take your antibiotics as directed. Do not stop taking them just because you feel better. You need to take the full course of antibiotics. When should you call for help? Call 911 anytime you think you may need emergency care. For example, call if: 
  · You passed out (lost consciousness).  
 Call your doctor now or seek immediate medical care if: 
  · You have severe belly pain.  
  · You have a fever not caused by the flu or some other illness that you know you have.  
  · You are vomiting or feel sick to your stomach.  
  · You are confused, or your confusion gets worse.  
  · Your skin or the whites of your eyes turn yellow or get more yellow.  
  · Your urine is dark yellow-brown, or your stools are light-colored.  
 Watch closely for changes in your health, and be sure to contact your doctor if: 
  · You do not get better as expected. Where can you learn more? Go to http://jeannie-gerardo.info/. Enter B206 in the search box to learn more about \"Cholangitis: Care Instructions. \" Current as of: May 12, 2017 Content Version: 11.7 © 8888-9771 [employer]. Care instructions adapted under license by [employer] (which disclaims liability or warranty for this information). If you have questions about a medical condition or this instruction, always ask your healthcare professional. [address] any warranty or liability for your use of this information. DISCHARGE SUMMARY from Nurse PATIENT INSTRUCTIONS: 
 
 
F-face looks uneven A-arms unable to move or move unevenly S-speech slurred or non-existent T-time-call 911 as soon as signs and symptoms begin-DO NOT go Back to bed or wait to see if you get better-TIME IS BRAIN. Warning Signs of HEART ATTACK Call 911 if you have these symptoms: 
? Chest discomfort. Most heart attacks involve discomfort in the center of the chest that lasts more than a few minutes, or that goes away and comes back. It can feel like uncomfortable pressure, squeezing, fullness, or pain. ? Discomfort in other areas of the upper body. Symptoms can include pain or discomfort in one or both arms, the back, neck, jaw, or stomach. ? Shortness of breath with or without chest discomfort. ? Other signs may include breaking out in a cold sweat, nausea, or lightheadedness. Don't wait more than five minutes to call [address]! Fast action can save your life. Calling 911 is almost always the fastest way to get lifesaving treatment. Emergency Medical Services staff can begin treatment when they arrive  up to an hour sooner than if someone gets to the hospital by car. The discharge information has been reviewed with the patient. The patient verbalized understanding. Discharge medications reviewed with the patient and appropriate educational materials and side effects teaching were provided. ___________________________________________________________________________________________________________________________________ ACO Transitions of Care Introducing [facility] Chantelle Vital offers a voluntary care coordination program to provide high quality service and care to [facility] fee-for-service beneficiaries. Tracyilya Maier was designed to help you enhance your health and well-being through the following services: ? Transitions of Care  support for individuals who are transitioning from one care setting to another (example: Hospital to home). ? Chronic and Complex Care Coordination  support for individuals and caregivers of those with serious or chronic illnesses or with more than one chronic (ongoing) condition and those who take a number of different medications. If you meet specific medical criteria, a [facility] [address] may call you directly to coordinate your care with your primary care physician and your other care providers. For questions about the [facility] programs, please, contact your physicians office. For general questions or additional information about Accountable Care Organizations: 
Please visit www.medicare.gov/acos. html or call 1-800-MEDICARE (5-215.811.7259) TTY users should call 9-273.795.2956. Epoch Announcement We are excited to announce that we are making your provider's discharge notes available to you in [facility]. You will see these notes when they are completed and signed by the physician that discharged you from your recent hospital stay.   If you have any questions or concerns about any information you see in [facility], please call the Health Information Department where you were seen or reach out to your Primary Care Provider for more information about your plan of care. Introducing [facility] & HEALTH SERVICES! Dear Melissa Kendall: Thank you for requesting a [facility] account. Our records indicate that you already have an active [facility] account. You can access your account anytime at https://[employer]. [employer] Did you know that you can access your hospital and ER discharge instructions at any time in [facility]? You can also review all of your test results from your hospital stay or ER visit. Additional Information If you have questions, please visit the Frequently Asked Questions section of the [facility] website at https://[employer]. [employer]/. Remember, [facility] is NOT to be used for urgent needs. For medical emergencies, dial 911. Now available from your iPhone and Android! Introducing Paco Saldaña As a [facility] patient, I wanted to make you aware of our electronic visit tool called Paco Saldaña. [facility] 24/7 allows you to connect within minutes with a medical provider 24 hours a day, seven days a week via a mobile device or tablet or logging into a secure website from your computer. You can access Paco Saldaña from anywhere in the [place]. A virtual visit might be right for you when you have a simple condition and feel like you just dont want to get out of bed, or cant get away from work for an appointment, when your regular [facility] provider is not available (evenings, weekends or holidays), or when youre out of town and need minor care. Electronic visits cost only $49 and if the [facility] 24/7 provider determines a prescription is needed to treat your condition, one can be electronically transmitted to a nearby pharmacy*. Please take a moment to enroll today if you have not already done so.   The enrollment process is free and takes just a few minutes. To enroll, please download the Roseann Card 24/7 gaetano to your tablet or phone, or visit [email or web address] to enroll on your computer. And, as an [address] [place] [address] patient with a [employer] account, the results of your visits will be scanned into your electronic medical record and your primary care provider will be able to view the scanned results. We urge you to continue to see your regular [employer] provider for your ongoing medical care. And while your primary care provider may not be the one available when you seek a [employer] virtual visit, the peace of mind you get from getting a real diagnosis real time can be priceless. For more information on [employer], view our Frequently Asked Questions (FAQs) at [email or web address]. Sincerely, 
 
Cynthia Thomas MD 
Chief Medical Officer EulalioVidhi Chantelle Vital *:  certain medications cannot be prescribed via [employer] Providers Seen During Your Hospitalization Provider Specialty Primary office phone Juani Cardona, [address] [facility] [phone] Karyle Aden, MD General Surgery [phone] Sreedhar Harding, [address] [facility] Internal Medicine [phone] Immunizations Administered for This Admission Name Date Influenza Vaccine (Quad) PF 9/20/2018 Your Primary Care Physician (PCP) Primary Care Physician Office Phone Office Fax Edil Hough [phone] You are allergic to the following No active allergies Recent Documentation Height Weight BMI Smoking Status 1.829 m 118.8 kg 35.53 kg/m2 Never Smoker Emergency Contacts Name Discharge Info Relation Home Work Mobile Dot Safe  Spouse [3] [phone] Socorro Maylin  Child [2] [phone] Patient Belongings The following personal items are in your possession at time of discharge: 
  Dental Appliances: Other (comment) (in hospital room bedside)  Visual Aid: Glasses, With patient      Home Medications: None   Jewelry: None  Clothing: Pants, Shirt Please provide this summary of care documentation to your next provider. Signatures-by signing, you are acknowledging that this After Visit Summary has been reviewed with you and you have received a copy. Patient Signature:  ____________________________________________________________ Date:  ____________________________________________________________  
  
[facility] Provider Signature:  ____________________________________________________________ Date:  ____________________________________________________________

## 2018-09-17 ENCOUNTER — ANESTHESIA EVENT (OUTPATIENT)
Dept: ENDOSCOPY | Age: 68
DRG: 445 | End: 2018-09-17
Payer: MEDICARE

## 2018-09-17 ENCOUNTER — APPOINTMENT (OUTPATIENT)
Dept: ULTRASOUND IMAGING | Age: 68
DRG: 445 | End: 2018-09-17
Attending: SURGERY
Payer: MEDICARE

## 2018-09-17 PROBLEM — D72.829 LEUKOCYTOSIS: Status: ACTIVE | Noted: 2018-09-17

## 2018-09-17 PROBLEM — R74.8 ELEVATED LIVER ENZYMES: Status: ACTIVE | Noted: 2018-09-17

## 2018-09-17 PROBLEM — N17.9 AKI (ACUTE KIDNEY INJURY) (HCC): Status: ACTIVE | Noted: 2018-09-17

## 2018-09-17 PROBLEM — R10.9 ABDOMINAL PAIN: Status: ACTIVE | Noted: 2018-09-17

## 2018-09-17 PROBLEM — K83.09 CHOLANGITIS: Status: ACTIVE | Noted: 2018-09-17

## 2018-09-17 PROBLEM — K83.1 BILIARY OBSTRUCTION: Status: ACTIVE | Noted: 2018-09-17

## 2018-09-17 LAB
ALBUMIN SERPL-MCNC: 2.8 G/DL (ref 3.2–4.6)
ALBUMIN/GLOB SERPL: 0.7 {RATIO} (ref 1.2–3.5)
ALP SERPL-CCNC: 171 U/L (ref 50–136)
ALT SERPL-CCNC: 294 U/L (ref 12–65)
ANION GAP SERPL CALC-SCNC: 11 MMOL/L (ref 7–16)
AST SERPL-CCNC: 69 U/L (ref 15–37)
BILIRUB DIRECT SERPL-MCNC: 3.5 MG/DL
BILIRUB SERPL-MCNC: 5.4 MG/DL (ref 0.2–1.1)
BUN SERPL-MCNC: 21 MG/DL (ref 8–23)
CALCIUM SERPL-MCNC: 8.7 MG/DL (ref 8.3–10.4)
CHLORIDE SERPL-SCNC: 102 MMOL/L (ref 98–107)
CO2 SERPL-SCNC: 27 MMOL/L (ref 21–32)
CREAT SERPL-MCNC: 1.21 MG/DL (ref 0.8–1.5)
ERYTHROCYTE [DISTWIDTH] IN BLOOD BY AUTOMATED COUNT: 14.7 %
GLOBULIN SER CALC-MCNC: 3.8 G/DL (ref 2.3–3.5)
GLUCOSE SERPL-MCNC: 134 MG/DL (ref 65–100)
HCT VFR BLD AUTO: 42.9 % (ref 41.1–50.3)
HGB BLD-MCNC: 14.6 G/DL (ref 13.6–17.2)
INR PPP: 1.2
MCH RBC QN AUTO: 28.6 PG (ref 26.1–32.9)
MCHC RBC AUTO-ENTMCNC: 34 G/DL (ref 31.4–35)
MCV RBC AUTO: 84.1 FL (ref 79.6–97.8)
NRBC # BLD: 0 K/UL (ref 0–0.2)
PLATELET # BLD AUTO: 185 K/UL (ref 150–450)
PMV BLD AUTO: 9.3 FL (ref 9.4–12.3)
POTASSIUM SERPL-SCNC: 3.3 MMOL/L (ref 3.5–5.1)
PROT SERPL-MCNC: 6.6 G/DL (ref 6.3–8.2)
PROTHROMBIN TIME: 14.3 SEC (ref 11.5–14.5)
RBC # BLD AUTO: 5.1 M/UL (ref 4.23–5.6)
SODIUM SERPL-SCNC: 140 MMOL/L (ref 136–145)
WBC # BLD AUTO: 9.9 K/UL (ref 4.3–11.1)

## 2018-09-17 PROCEDURE — 80074 ACUTE HEPATITIS PANEL: CPT

## 2018-09-17 PROCEDURE — 74011250637 HC RX REV CODE- 250/637: Performed by: SURGERY

## 2018-09-17 PROCEDURE — 76700 US EXAM ABDOM COMPLETE: CPT

## 2018-09-17 PROCEDURE — 85027 COMPLETE CBC AUTOMATED: CPT

## 2018-09-17 PROCEDURE — 65270000029 HC RM PRIVATE

## 2018-09-17 PROCEDURE — 85610 PROTHROMBIN TIME: CPT

## 2018-09-17 PROCEDURE — 74011250636 HC RX REV CODE- 250/636: Performed by: SURGERY

## 2018-09-17 PROCEDURE — 82248 BILIRUBIN DIRECT: CPT

## 2018-09-17 PROCEDURE — 74011000258 HC RX REV CODE- 258: Performed by: SURGERY

## 2018-09-17 PROCEDURE — 80053 COMPREHEN METABOLIC PANEL: CPT

## 2018-09-17 PROCEDURE — 36415 COLL VENOUS BLD VENIPUNCTURE: CPT

## 2018-09-17 RX ORDER — FAMOTIDINE 10 MG/ML
20 INJECTION INTRAVENOUS EVERY 12 HOURS
Status: DISCONTINUED | OUTPATIENT
Start: 2018-09-17 | End: 2018-09-19

## 2018-09-17 RX ADMIN — HYDROMORPHONE HYDROCHLORIDE 0.5 MG: 1 INJECTION, SOLUTION INTRAMUSCULAR; INTRAVENOUS; SUBCUTANEOUS at 19:53

## 2018-09-17 RX ADMIN — PIPERACILLIN SODIUM,TAZOBACTAM SODIUM 3.38 G: 3; .375 INJECTION, POWDER, FOR SOLUTION INTRAVENOUS at 05:43

## 2018-09-17 RX ADMIN — FAMOTIDINE 20 MG: 10 INJECTION, SOLUTION INTRAVENOUS at 12:31

## 2018-09-17 RX ADMIN — PIPERACILLIN SODIUM,TAZOBACTAM SODIUM 3.38 G: 3; .375 INJECTION, POWDER, FOR SOLUTION INTRAVENOUS at 14:18

## 2018-09-17 RX ADMIN — FAMOTIDINE 20 MG: 10 INJECTION, SOLUTION INTRAVENOUS at 21:56

## 2018-09-17 RX ADMIN — Medication 5 ML: at 21:57

## 2018-09-17 RX ADMIN — PIPERACILLIN SODIUM,TAZOBACTAM SODIUM 3.38 G: 3; .375 INJECTION, POWDER, FOR SOLUTION INTRAVENOUS at 21:57

## 2018-09-17 RX ADMIN — ACETAMINOPHEN 650 MG: 325 TABLET ORAL at 16:00

## 2018-09-17 RX ADMIN — HYDROMORPHONE HYDROCHLORIDE 0.5 MG: 1 INJECTION, SOLUTION INTRAMUSCULAR; INTRAVENOUS; SUBCUTANEOUS at 09:00

## 2018-09-17 RX ADMIN — DEXTROSE MONOHYDRATE, SODIUM CHLORIDE, AND POTASSIUM CHLORIDE 100 ML/HR: 50; 4.5; 1.49 INJECTION, SOLUTION INTRAVENOUS at 12:31

## 2018-09-17 NOTE — ED NOTES
Spoke with MD Otto Urbano who received call from [facility]. Per Otto Urbano, no new labs needed.  He will be calling surgical.

## 2018-09-17 NOTE — PROGRESS NOTES
's initial visit attempted. I knocked on the door but heard no response. Provided 's card at door for future reference. Follow-up visits are available as desired. Nickie Atwood MDiv Board Certified [employer]

## 2018-09-17 NOTE — CONSULTS
[facility] Consult Note Primary GI Physician: Zohra Betancur Referring Physician:  Annah Harada, NP Consult Date:  9/17/2018 Admit Date:  9/16/2018 Chief Complaint:  Cholangitis Subjective:  
 
History of Present Illness:  Patient is a 75 y.o. male with PMH of CAD, GERD, PUD, HTN, HLD, hx of gallstones s/p cholecystectomy, who is seen in consultation at the request of Annah Harada, NP for cholangitis. He states he began having increased pressure pain and bloating over his upper abdomen on Friday, radiating to his back and right shoulder, becoming more severe, constant. He had associated nausea and fever and chills off and on. He did not have much of an appetite, only tolerating Ensure type drinks over the weekend. He had increased itching Saturday and darker urine, as well as yellowing of his skin. He presented to the [facility] ER on Sunday due to his persistent symptoms and was noted to have elevated WBC and LFTs with negative CT scan. Given concern for cholangitis and his recent cholecystectomy about 10 weeks ago at [facility], he was transferred here. He was given a dose of Zosyn an Flagyl at [facility], and his WBC has improved. He began having some improvement in the pain Saturday, but has lingering soreness and fevers. He denies any history of liver disease or family history of liver disease. He denies any ETOH or drug use. He has had regular BMs, and denies any bloody or black stools. He denies any heartburn, chest pain, chronic cough, hoarseness, or difficulty swallowing. He states he had a colonoscopy over 10 yrs ago in [place], [place] with a negative exam. 
 
PMH: 
Past Medical History:  
Diagnosis Date  CAD (coronary artery disease) 12/19/2012  ED (erectile dysfunction) 12/19/2012  GERD (gastroesophageal reflux disease) 12/19/2012  
 managed by meds  HLD (hyperlipidemia) 12/19/2012  
 HTN (hypertension) 12/19/2012  
 managed by meds  Psychiatric disorder managed by meds Occasional skipped heartbeat - with negative cardiac cath and stress test per pt PUD Hx of gallstones s/p cholecystectomy He states he had a colonoscopy over 10 yrs ago in [place], [place] with a negative exam. 
 
PSH: 
Past Surgical History:  
Procedure Laterality Date  HX COLONOSCOPY    
 and EGD  HX HEART CATHETERIZATION    
 HX HERNIA REPAIR    
 umbilical  
 
 
Allergies: 
No Known Allergies Home Medications: 
Prior to Admission medications Medication Sig Start Date End Date Taking? Authorizing Provider  
tamsulosin (FLOMAX) 0.4 mg capsule Take 0.4 mg by mouth daily. Yes Historical Provider  
aspirin delayed-release 81 mg tablet Take 81 mg by mouth nightly. Yes Historical Provider  
citalopram (CELEXA) 20 mg tablet Take 1 Tab by mouth daily. 6/12/18  Yes Zeynep Moses MD  
clonazePAM (KLONOPIN) 1 mg tablet Take 1 Tab by mouth three (3) times daily. Max Daily Amount: 3 mg. Patient taking differently: Take 1 mg by mouth daily. And two times a day prn 6/12/18  Yes Zeynep Moses MD  
lisinopril-hydroCHLOROthiazide (PRINZIDE, ZESTORETIC) 20-25 mg per tablet Take 1 Tab by mouth daily. 6/12/18  Yes Zeynep Moses MD  
metoprolol succinate (TOPROL-XL) 100 mg tablet Take 1 Tab by mouth daily. Patient taking differently: Take 100 mg by mouth nightly. 6/12/18  Yes Zeynep Moses MD  
omeprazole (PRILOSEC) 40 mg capsule Take 1 Cap by mouth daily. 6/12/18  Yes Zeynep Moses MD  
simvastatin (ZOCOR) 40 mg tablet Take 1 Tab by mouth nightly. 6/12/18  Yes Zeynep Moses MD  
 
 
Hospital Medications: 
Current Facility-Administered Medications Medication Dose Route Frequency  sodium chloride (NS) flush 5-10 mL  5-10 mL IntraVENous Q8H  
 sodium chloride (NS) flush 5-10 mL  5-10 mL IntraVENous PRN  
 dextrose 5% - 0.45% NaCl with KCl 20 mEq/L infusion  100 mL/hr IntraVENous CONTINUOUS  
 acetaminophen (TYLENOL) tablet 650 mg  650 mg Oral Q4H PRN  
  oxyCODONE-acetaminophen (PERCOCET) 5-325 mg per tablet 1 Tab  1 Tab Oral Q4H PRN  
 HYDROmorphone (PF) (DILAUDID) injection 0.5 mg  0.5 mg IntraVENous Q4H PRN  
 naloxone (NARCAN) injection 0.4 mg  0.4 mg IntraVENous PRN  
 diphenhydrAMINE (BENADRYL) injection 12.5 mg  12.5 mg IntraVENous Q4H PRN  
 ondansetron (ZOFRAN) injection 4 mg  4 mg IntraVENous Q4H PRN  piperacillin-tazobactam (ZOSYN) 3.375 g in 0.9% sodium chloride (MBP/ADV) 100 mL  3.375 g IntraVENous Q8H  
 influenza vaccine 2018-19 (6 mos+)(PF) (FLUARIX QUAD/FLULAVAL QUAD) injection 0.5 mL  0.5 mL IntraMUSCular PRIOR TO DISCHARGE Social History: 
Social History Substance Use Topics  Smoking status: Never Smoker  Smokeless tobacco: Never Used  Alcohol use No  
 
Pt is . He denies any history of drug use. Family History: 
Family History Problem Relation Age of Onset  Hypertension Mother  Cancer Father Skin  Diabetes Other Cousin Review of Systems: A detailed 10 system ROS is obtained, with pertinent positives as listed above. All others are negative. Diet:  NPO Objective:  
 
Physical Exam: 
Vitals: 
Visit Vitals  /71  Pulse 68  Temp 98.9 °F (37.2 °C)  Resp 18  Ht 6' (1.829 m)  Wt 118.8 kg (262 lb)  SpO2 92%  BMI 35.53 kg/m2 Gen:  Pt is alert, cooperative, no acute distress, sitting in chair at bedside, slight jaundice Skin:  Extremities and face reveal no rashes. HEENT: Sclerae icteric very slightly. Extra-occular muscles are intact. No oral ulcers. No abnormal pigmentation of the lips. The neck is supple. Cardiovascular: Regular rate and rhythm. No murmurs, gallops, or rubs. Respiratory:  Comfortable breathing with no accessory muscle use. Clear breath sounds anteriorly with no wheezes, rales, or rhonchi. GI:  Abdomen nondistended, soft, and tender over RUQ.   Normal active bowel sounds. No enlargement of the liver or spleen. No masses palpable. Rectal:  Deferred Musculoskeletal:  No pitting edema of the lower legs. Neurological:  Gross memory appears intact. Patient is alert and oriented. Psychiatric:  Mood appears appropriate with judgement intact. Lymphatic:  No cervical or supraclavicular adenopathy. Laboratory:   
Recent Labs  
   09/17/18 
 7042 WBC  9.9 HGB  14.6 HCT  42.9 PLT  185 MCV  84.1 NA  140  
K  3.3*  
CL  102 CO2  27 BUN  21  
CREA  1.21  
CA  8.7 GLU  134* AP  171* SGOT  69* ALT  294* TBILI  5.4* ALB  2.8*  
TP  6.6 Labs from [facility] 16 Sept 2018: 
Blood Gas Blood Gas Component Value Ref Range Performed At [address] [place] [address] LAB Specimen Type, Blood Gas Venous   [address] [facility] Patient Temperature 98.6 98.5 - [facility] [address] [facility] Lactate 1.5 0.5 - 2.2 mmol/L [address] [facility] Complete Blood Count Complete Blood Count Component Value Ref Range Performed At WBC 13.9 (H) 4.0 - 11.5 TH/mm3 [address] [facility]  
RBC 5.88 4.50 - 5.90 [address] [facility] Hemoglobin 16.7 13.5 - 17.5 g/dL [address] [facility] Hematocrit 50.0 41.0 - 53.0 % [address] [facility]  
MCV 85.0 80.0 - 100.0 fL [address] [facility]  
MCH 28.4 26.0 - 34.0 pg [address] [facility]  
MCHC 33.3 31.0 - 37.0 g/dL [address] [facility]  
RDW 15.3 12.0 - 16.0 % [address] [facility] Platelets Õli 68 Th/mm3 [address] [facility] MPV 7.6 6.9 - 10.6 fL [address] [facility] Neutrophils, % 86.5 % [address] [facility] Lymphocytes, % 6.6 % [address] [facility] Monocytes % 6.1 % [address] [facility] Eosinophils % 0.2 % [address] [facility] Basophils % 0.6 % [address] [facility] Neutrophils, Abs 12.0 (H) 1.4 - 6.6 TH/mm3 [address] [facility] Lymphocytes, Abs 0.9 (L) 1.0 - 3.5 TH/mm3 [address] [facility] Monocytes Abs 0.9 <1.0 TH/mm3 [address] [facility] Eosinophils, Abs 0.0 <0.7 TH/mm3 [address] [facility] Basophils, Abs 0.1 (H) <0.1 TH/mm3 [address] [facility] Lipase Lipase Component Value Ref Range Performed At Lipase 15 8 - 78 U/L [facility] Comprehen Metabolic Panel (CMP) Comprehen Metabolic Panel (CMP) Component Value Ref Range Performed At Sodium 136 133 - 144 mmol/L [facility] Potassium 3.4 3.4 - 5.1 mmol/L [address] [facility] Chloride 98 (L) 101 - 111 mmol/L [address] [facility]  
CO2 24 20 - 30 mmol/L [address] [facility] Anion Gap 14 6 - 16 mmol/L [facility]  
BUN 22 8 - 26 mg/dL [facility] Creatinine 1.51 (H)Comment: Specimen icteric, results will be falsely decreased. 0.72 - 1.25 mg/dL [address] [facility] Glucose 130 (H) 82 - 99 mg/dL [address] [facility] Calcium 10.4 8.5 - 10.4 mg/dL [facility]  
 (H) 5 - 51 U/L [facility]  (H) 1 - 55 U/L [facility] Alkaline Phosphatase 194 (H) 32 - 125 U/L [facility] Protein, Total 7.5 6.0 - 8.3 g/dL [facility] Albumin 3.5 3.4 - 4.8 g/dL [facility] Bilirubin, Total 6.2 (H) 0.1 - 1.2 mg/dL [facility]  
eGFR Non-African American 47 (L) >59 mL/min [address] [facility]  
eGFR  54 (L) >59 mL/min [address] [facility] Urine Microscopic Urine Microscopic Component Value Ref Range Performed At WBC, UA 5-10 (A) 0 , <1, 1-3, None Seen /HPF [facility]  
RBC, UA 3-5 (A) 0 , 1 , 1-2, None Seen, <1 [address] LAB Bacteria, UA 1+ (A) None Seen [facility] [address] [facility] Coarse Granular Casts, UA 1-5 (A) None Seen /LPF [facility] LAB Back to top of Lab Results Urinalysis w/Reflex Microscopic Urinalysis w/Reflex Microscopic Component Value Ref Range Performed At Color, UA Dark Yellow   [address] [facility] Clarity, [address] LAB Specific [place], UA 1.010 1.003 - 1.040 [address] [facility]  
pH, UA 6.0 4.6 - 8.0 [address] [facility] Leukocyte Esterase, UA Negative Negative [address] [facility] Nitrite, UA Negative Negative [address] [facility] Protein, UA Trace (A) Negative mg/dL [address] [facility] Glucose, UA Negative Negative mg/dl [address] [facility] Ketones, UA Negative Negative mg/dL [address] [facility] Urobilinogen, UA 1.0 <2.0 E.U./dL [address] [facility] Blood, UA Small (A) Negative [address] [facility] Bilirubin, UA Moderate (A) Negative mg/dL [address] [facility]  
 
CT ABDOMEN PELVIS W CONTRAST ACCESSION NO: [id number] ORDERED BY: RUPALI IZQUIERDO DO 
DATE OF EXAM: 09/16/2018 18:10 
REASON FOR EXAM: [facility] only, abd pain, fever, hx recent GB removal 
ADMISSION DATE: 09/16/2018 17:27 CT of the abdomen and pelvis was performed following intravenous administration of 100 ml of Omnipaque 350 intravenous contrast. 
Clinical Indication: Abdominal pain. Fever. History of recent cholecystectomy. Comparison: None Findings: There are mild bibasilar lung opacities consistent with atelectasis. No pneumothorax or pleural effusion are seen. Coronary artery calcifications are present. The liver, spleen, and bilateral adrenal glands are within normal limits for size. There is atrophy of the pancreas. Cholecystectomy clips are present in the right upper quadrant. No significant fluid collection is seen within the right upper quadrant. Review of both kidneys demonstrates no evidence of hydronephrosis or hydroureter. There is a benign-appearing cyst arising from the upper pole of the left kidney. The bladder suboptimally distended limiting evaluation. Prostate calcifications are present. Review of the bowel demonstrates a nonobstructive bowel gas pattern. There are multiple uncomplicated diverticuli within the distal large bowel. The appendix is not visualized. There is no evidence of free air within the abdomen or pelvis. There are vascular calcifications of the aorta and associated vessels. No abdominal aortic aneurysm is seen. Review of the osseous structures demonstrates degenerative changes of the thoracolumbar spine and pelvis. Impression: 1. No evidence of acute process. 2. Other findings as discussed above. : genevieve TRANSCRIBE TIME/DATE: 09/16/2018 06:39 pm 
READ BY: Pako Pimentel MD 
THIS IS AN ELECTRONICALLY VERIFIED REPORT  
9/16/2018 6:39 PM: Pako Pimentel MD  
 
Assessment:  
 
Principal Problem: Hyperbilirubinemia (9/16/2018) 76 yo male with PMH of CAD, GERD, PUD, HTN, HLD, hx of gallstones s/p cholecystectomy, who is seen in consultation at the request of Pura Farrar NP for cholangitis, after transferred to [facility] from [facility] after presenting with increased upper abd pain, nausea, fevers, and noted to have leukocytosis and elevated LFTs on labs with negative CT scan. Concern for cholangitis given findings and history of recent cholecystectomy with gallstones. He is feeling a little better today. US pending to assess bile ducts. WBCs improved with ATBXs. Plan:  
 
-Supportive care, IVF. 
-NPO. -US pending. 
-May need to consider ERCP, but awaiting US findings. 
-On Zosyn. 
-Discuss colonoscopy as outpatient for surveillance. Patient is seen and examined in collaboration with Dr. Addy Kessler. Assessment and plan as per Dr. Mary Morgan. CAS[facility]

## 2018-09-17 NOTE — H&P
H&P/Consult Note/Progress Note/Office Note:  
Chester Haile  MRN: [id number]  :1950  Age:72 y.o. 
 
HPI: Chester Haile is a 75 y.o. male is s/p lap cholecystectomy on 18. He was transferred to [facility] from [facility] after presenting with fever, epigastric pain, and Tbili of 6. We were contacted being he was s/p cholecystectomy for recommendations and subsequently had him sent here. His CT at [facility] did not have acute findings. He is currently in NAD but continues to report epigastric pain that radiates to his shoulder. He denies nausea or vomiting. He further denies difficulty moving his bowels. Abdominal pain has no direct relationship with PO intake. His current Tbili is 5.4, , AST 69, ALP 69, AF, VSS. Previous history: He missed his final follow-up visit with Dr. Oneida Florentino on 2018. Came in the week prior and had his skin clips removed with our nurse. He got the impression he did not need to come back. Dr Oneida Florentino called him at home on 18 and pt was doing well with no issues. Prior to surgery he reported a 3 year h/o of intermittent episodes of RUQ pain which was worse just before surgery. He was on omeprazole for remote history of gastritis but this did not alleviate his symptoms or prevent them from happening. Additional antacids were also not helping. He denied any associated nausea vomiting fevers or chills. He was not on blood thinners. He had a prior umbilical hernia repair at [facility] in approximately  (He did not know if mesh was used). He reported a history of peptic ulcer disease from age 14. He has seen gastroenterology but does not remember the name of his gastroenterologist.  
He reported a prior unremarkable colonoscopy but reported this is more than 5 years ago. He reported a prior CT from approximately  at [facility] which identified gallstones.  
 
 
16 U/S abd 
 Hx:  Abd distention, hx gallstones 
  The liver is slightly echogenic. There are no discrete lesions in the visualized portions of the liver, pancreas, or spleen. There is no bile duct 
dilatation. The common bile duct measures 6 mm. There are multiple small stones in the gallbladder. .  There is no significant wall thickening. The gallbladder is not distended. 
  
The right kidney measures 11.7 cm in length. The left kidney measures 12.9 cm. There is no hydronephrosis. There is no evidence of a renal mass. There is no ascites. The aorta and inferior vena cava are normal in caliber. 
  
IMPRESSION: Cholelithiasis 6/22/18 RUQ U/S Hx: RUQ pain, h/o gallstones. 
  
FINDINGS: Pancreas is grossly unremarkable although the tail is partially obscured by overlying bowel gas. Aorta is normal caliber, 2.0 cm. The IVC is patent. The normal sized spleen has a homogenous echotexture and measures 12.3 cm. Left kidney contains a 15 mm simple appearing cyst, otherwise unremarkable without hydronephrosis and measures 12.1 cm. Right kidney is unremarkable without hydronephrosis and measures 12 cm. Renal echogenicity is normal, the right kidney is hypoechoic to the liver. The intrahepatic biliary tree is not dilated. There is hepatopetal flow in the portal vein. No gross focal parenchymal abnormality identified within the liver. The gallbladder wall is thickened, 3 - 6 mm. Multiple small echogenic shadowing mobile gallstones. Negative Izaguirre's sign. The common bile duct is not dilated 6 mm.  
  
IMPRESSION: Cholelithiasis and gallbladder wall thickening. No acute cholecystitis or biliary tree obstruction 9/16/18 CTAP from [facility] Findings: There are mild bibasilar lung opacities consistent with atelectasis.  No pneumothorax or pleural effusion are seen. Coronary artery calcifications are present. The liver, spleen, and bilateral adrenal glands are within normal limits for size. There is atrophy of the pancreas. Cholecystectomy clips are present in the right upper quadrant.  No significant fluid collection is seen within the right upper quadrant. Review of both kidneys demonstrates no evidence of hydronephrosis or hydroureter.  There is a benign-appearing cyst arising from the upper pole of the left kidney. The bladder suboptimally distended limiting evaluation. Prostate calcifications are present. Review of the bowel demonstrates a nonobstructive bowel gas pattern.  There are multiple uncomplicated diverticuli within the distal large bowel.  The appendix is not visualized. There is no evidence of free air within the abdomen or pelvis. There are vascular calcifications of the aorta and associated vessels.  No abdominal aortic aneurysm is seen. Review of the osseous structures demonstrates degenerative changes of the thoracolumbar spine and pelvis. Impression: 1.  No evidence of acute process. 2.  Other findings as discussed above. 9/17/18 Abd U/S Hx: Prior laparoscopic cholecystectomy with elevated liver function tests. COMPARISON: 22 June 2018. 
  
FINDINGS: Pancreas obscured by overlying bowel gas. Aorta is normal caliber, 1.8 
cm. The IVC is patent. The normal sized spleen has a homogenous echotexture and 
measures 12 cm. Left kidney is unremarkable without hydronephrosis and measures 12.8 cm. Right kidney is unremarkable without hydronephrosis and measures 12.1 
cm. Renal echogenicity is normal, the right kidney is hypoechoic to the liver. The intrahepatic biliary tree is not dilated. There is hepatopetal flow in the 
portal vein. No gross focal parenchymal abnormality identified within the liver. Increased liver echogenicity suggesting fatty infiltration. The gallbladder is 
absent. The common bile duct is not dilated 6 mm.  
  
IMPRESSION: Negative for biliary tree obstruction. Increased liver echogenicity suggesting fatty infiltration Past Medical History:  
Diagnosis Date  CAD (coronary artery disease) 12/19/2012  ED (erectile dysfunction) 12/19/2012  GERD (gastroesophageal reflux disease) 12/19/2012  
 managed by meds  HLD (hyperlipidemia) 12/19/2012  
 HTN (hypertension) 12/19/2012  
 managed by meds  Psychiatric disorder   
 managed by meds Past Surgical History:  
Procedure Laterality Date  HX COLONOSCOPY    
 and EGD  HX HEART CATHETERIZATION    
 HX HERNIA REPAIR    
 umbilical  
 
Current Facility-Administered Medications Medication Dose Route Frequency  sodium chloride (NS) flush 5-10 mL  5-10 mL IntraVENous Q8H  
 sodium chloride (NS) flush 5-10 mL  5-10 mL IntraVENous PRN  
 dextrose 5% - 0.45% NaCl with KCl 20 mEq/L infusion  100 mL/hr IntraVENous CONTINUOUS  
 acetaminophen (TYLENOL) tablet 650 mg  650 mg Oral Q4H PRN  
 oxyCODONE-acetaminophen (PERCOCET) 5-325 mg per tablet 1 Tab  1 Tab Oral Q4H PRN  
 HYDROmorphone (PF) (DILAUDID) injection 0.5 mg  0.5 mg IntraVENous Q4H PRN  
 naloxone (NARCAN) injection 0.4 mg  0.4 mg IntraVENous PRN  
 diphenhydrAMINE (BENADRYL) injection 12.5 mg  12.5 mg IntraVENous Q4H PRN  
 ondansetron (ZOFRAN) injection 4 mg  4 mg IntraVENous Q4H PRN  piperacillin-tazobactam (ZOSYN) 3.375 g in 0.9% sodium chloride (MBP/ADV) 100 mL  3.375 g IntraVENous Q8H  
 influenza vaccine 2018-19 (6 mos+)(PF) (FLUARIX QUAD/FLULAVAL QUAD) injection 0.5 mL  0.5 mL IntraMUSCular PRIOR TO DISCHARGE Review of patient's allergies indicates no known allergies. Social History Social History  Marital status:  Spouse name: N/A  
 Number of children: N/A  
 Years of education: N/A Social History Main Topics  Smoking status: Never Smoker  Smokeless tobacco: Never Used  Alcohol use No  
 Drug use: No  
 Sexual activity: Not on file Other Topics Concern  Not on file Social History Narrative History Smoking Status  Never Smoker Smokeless Tobacco  
 Never Used Family History Problem Relation Age of Onset  Hypertension Mother  Cancer Father Skin  Diabetes Other Cousin ROS: The patient has no difficulty with chest pain or shortness of breath. No fever or chills. Comprehensive review of systems was otherwise unremarkable except as noted above. Physical Exam: (from most recent visit) Visit Vitals  /71  Pulse 68  Temp 98.9 °F (37.2 °C)  Resp 18  Ht 6' (1.829 m)  Wt 262 lb (118.8 kg)  SpO2 92%  BMI 35.53 kg/m2 Constitutional: Alert, oriented, cooperative patient in no acute distress; appears stated age Eyes:Sclera are clear. EOMs intact ENMT: no external lesions gross hearing normal; no obvious neck masses, no ear or lip lesions, nares normal 
CV: RRR. Normal perfusion Resp: No JVD. Breathing is  non-labored; no audible wheezing. GI: soft and non-distended; obese, entire upper abdomen TTP, worse in epigastric region, no rebound, no guarding Musculoskeletal: unremarkable with normal function. No embolic signs or cyanosis. Neuro:  Oriented; moves all 4; no focal deficits Psychiatric: normal affect and mood, no memory impairment Recent vitals (if inpt): 
Patient Vitals for the past 24 hrs: 
 BP Temp Pulse Resp SpO2 Height Weight  
09/17/18 0751 117/71 98.9 °F (37.2 °C) 68 18 92 % - -  
09/17/18 0300 102/65 98.4 °F (36.9 °C) 79 18 92 % - -  
09/16/18 2302 123/70 97.6 °F (36.4 °C) 70 20 94 % - -  
09/16/18 2151 134/74 97.4 °F (36.3 °C) 89 20 94 % 6' (1.829 m) 262 lb (118.8 kg) Labs: 
Recent Labs  
   09/17/18 
 7762  09/17/18 
 3736 WBC   --   9.9 HGB   --   14.6 PLT   --   185 NA   --   140 K   --   3.3*  
CL   --   102 CO2   --   27 BUN   --   21  
CREA   --   1.21  
GLU   --   134* TBILI   --   5.4* CBIL  3.5*   --   
SGOT   --   69* ALT   --   294* AP   --   171* Lab Results Component Value Date/Time WBC 9.9 09/17/2018 04:42 AM  
 HGB 14.6 09/17/2018 04:42 AM  
 PLATELET 382 25/80/1552 04:42 AM  
 Sodium 140 09/17/2018 04:42 AM  
 Potassium 3.3 (L) 09/17/2018 04:42 AM  
 Chloride 102 09/17/2018 04:42 AM  
 CO2 27 09/17/2018 04:42 AM  
 BUN 21 09/17/2018 04:42 AM  
 Creatinine 1.21 09/17/2018 04:42 AM  
 Glucose 134 (H) 09/17/2018 04:42 AM  
 Bilirubin, total 5.4 (H) 09/17/2018 04:42 AM  
 Bilirubin, direct 3.5 (H) 09/17/2018 09:39 AM  
 AST (SGOT) 69 (H) 09/17/2018 04:42 AM  
 ALT (SGPT) 294 (H) 09/17/2018 04:42 AM  
 Alk. phosphatase 171 (H) 09/17/2018 04:42 AM  
 
 
I reviewed recent labs and recent radiologic studies. I independently reviewed radiology images for studies I described above or studies I have ordered. Admission date (for inpatients): 9/16/2018 [unfilled]  [unfilled] ASSESSMENT/PLAN: 
Problem List  Date Reviewed: 7/23/2018 Codes Class Noted * (Principal)Abdominal pain ICD-10-CM: R10.9 ICD-9-CM: 789.00  9/17/2018 Elevated liver enzymes ICD-10-CM: R74.8 ICD-9-CM: 790.5  9/17/2018 LEONCIO [facility] ICD-10-CM: N17.9 ICD-9-CM: 584.9  9/17/2018 Leukocytosis ICD-10-CM: O74.378 ICD-9-CM: 288.60  9/17/2018 Hyperbilirubinemia ICD-10-CM: E80.6 ICD-9-CM: 782.4  9/16/2018 Chronic calculous cholecystitis ICD-10-CM: K80.10 ICD-9-CM: 574.10  6/27/2018 Overview Addendum 7/15/2018  9:15 AM by Kandis Garces MD  
  7/6/18 s/p cristi taylor; Dr Oneida Florentino DIAGNOSIS GALLBLADDER: CHRONIC CHOLECYSTITIS; CHOLELITHIASIS. Electronically signed out on 7/8/2018 17:43 by João Singer. Danette Wheatley MD  
Microscopic Description : Chronic inflammation is seen in the wall of the gallbladder. No dysplasia is identified. Dr. Stewart Phan concurs Severe obesity (BMI 35.0-39.9) ([facility] ICD-10-CM: E66.01 
ICD-9-CM: 278.01  6/12/2018 HLD (hyperlipidemia) ICD-10-CM: E78.5 ICD-9-CM: 272.4  12/19/2012  HTN (hypertension) ICD-10-CM: I10 
 ICD-9-CM: 401.9  12/19/2012 CAD (coronary artery disease) ICD-10-CM: I25.10 ICD-9-CM: 414.00  12/19/2012 ED (erectile dysfunction) ICD-10-CM: N52.9 ICD-9-CM: 607.84  12/19/2012 GERD (gastroesophageal reflux disease) ICD-10-CM: K21.9 ICD-9-CM: 530.81  12/19/2012 Principal Problem: 
  Abdominal pain (9/17/2018) Active Problems: 
  HLD (hyperlipidemia) (12/19/2012) HTN (hypertension) (12/19/2012) CAD (coronary artery disease) (12/19/2012) GERD (gastroesophageal reflux disease) (12/19/2012) Severe obesity (BMI 35.0-39.9) ([facility] (6/12/2018) Hyperbilirubinemia (9/16/2018) Elevated liver enzymes (9/17/2018) LEONCIO (acute kidney injury) ([facility] (9/17/2018) Leukocytosis (9/17/2018) He was been doing well s/p lap cholecystectomy on 7/6/18 up to this point. Now has findings concerning for biliary obstruction and possible early cholangitis. Admit to inpatient Hospitalist (may elect to take over as primary) and GI consulted CT negative Serial exams Await GI workup Will continue to monior Signed:  Helene Dodson MD 
 
I have seen and examined the patient with the Nurse Practitioner and agree with the above assessment and plan. Check U/S and Hepatitis panel GI will consider ERCP Surendra Pardo MD'

## 2018-09-17 NOTE — PROGRESS NOTES
Verbal bedside report received from Jaky Fox, [facility]. Assumed care of patient.

## 2018-09-17 NOTE — PROGRESS NOTES
TRANSFER - IN REPORT: 
 
Verbal report received from Lidia(name) on Peggy Willingham  being received from ER(unit) for routine progression of care Report consisted of patients Situation, Background, Assessment and  
Recommendations(SBAR). Information from the following report(s) ED Summary was reviewed with the receiving nurse. Opportunity for questions and clarification was provided. Assessment completed upon patients arrival to unit and care assumed. Arrived awake. Alert. oriented x4. Skin intact. Previous gallbladder surgery puncture sites healed. Jaundiced. Call light in reach. Papers from [facility] in chart.

## 2018-09-17 NOTE — CONSULTS
Hospitalist Consult Patient: Hina Dejesus MRN: [id number]  SSN: [id number] YOB: 1950  Age: 75 y.o. Sex: male Subjective:  
  
Hina Dejesus is a 75 y.o. male with a PMH of HTN, GERD, CAD and recent cholecystectomy on 7/6/18 who is being seen for RUQ pain, jaundice, and HTN. He had a lap taylor on 7/6/18 and was doing well post-op until 3 days prior to admission when he had sudden onset RUQ pain, nausea, and diarrhea. He tried to take some pain meds at home, which did not help, so he went to the ER at [facility] on the evening of 9/16/18. The workup there showed elevated liver enzymes, leukocytosis, creatinine 1.51, and an unremarkable CT abd/pelvis. Due to probably cholangitis +/- biliary obstruction, he was transferred to [facility] ER since his surgeon is here. He was admitted to the surgical service. This morning, he continues to have RUQ pain and some mild nausea. States that he had a normal BM this morning. Denies F/C. Denies CP/SOB. Review of systems negative except stated above. Past Medical History:  
Diagnosis Date  CAD (coronary artery disease) 12/19/2012  ED (erectile dysfunction) 12/19/2012  GERD (gastroesophageal reflux disease) 12/19/2012  
 managed by meds  HLD (hyperlipidemia) 12/19/2012  
 HTN (hypertension) 12/19/2012  
 managed by meds  Psychiatric disorder   
 managed by meds Past Surgical History:  
Procedure Laterality Date  HX COLONOSCOPY    
 and EGD  HX HEART CATHETERIZATION    
 HX HERNIA REPAIR    
 umbilical  
  
Family History Problem Relation Age of Onset  Hypertension Mother  Cancer Father Skin  Diabetes Other Cousin Social History Substance Use Topics  Smoking status: Never Smoker  Smokeless tobacco: Never Used  Alcohol use No  
  
Current Facility-Administered Medications Medication Dose Route Frequency Provider Last Rate Last Dose  sodium chloride (NS) flush 5-10 mL  5-10 mL IntraVENous Q8H Isis Chhayaguanaco Jules, DO   10 mL at 09/16/18 2335  sodium chloride (NS) flush 5-10 mL  5-10 mL IntraVENous PRN Isis Jules, DO      
 dextrose 5% - 0.45% NaCl with KCl 20 mEq/L infusion  100 mL/hr IntraVENous CONTINUOUS Isis Jules,  mL/hr at 09/16/18 2326 100 mL/hr at 09/16/18 2326  acetaminophen (TYLENOL) tablet 650 mg  650 mg Oral Q4H PRN Isis Realiest, DO      
 oxyCODONE-acetaminophen (PERCOCET) 5-325 mg per tablet 1 Tab  1 Tab Oral Q4H PRN Isis Jules, DO      
 HYDROmorphone (PF) (DILAUDID) injection 0.5 mg  0.5 mg IntraVENous Q4H PRN Isis Jules, DO   0.5 mg at 09/17/18 0900  
 naloxone [facility]) injection 0.4 mg  0.4 mg IntraVENous PRN Isis Jules, DO      
 diphenhydrAMINE (BENADRYL) injection 12.5 mg  12.5 mg IntraVENous Q4H PRN Isis Jules, DO      
 ondansetron [facility]) injection 4 mg  4 mg IntraVENous Q4H PRN Isis Jules, DO      
 piperacillin-tazobactam (ZOSYN) 3.375 g in 0.9% sodium chloride (MBP/ADV) 100 mL  3.375 g IntraVENous Q8H Isis Jules, DO 25 mL/hr at 09/17/18 0543 3.375 g at 09/17/18 0543  influenza vaccine 2018-19 (6 mos+)(PF) (FLUARIX QUAD/FLULAVAL QUAD) injection 0.5 mL  0.5 mL IntraMUSCular PRIOR TO DISCHARGE Isis Jules, DO No Known Allergies Objective:  
 
Vitals:  
 09/16/18 2151 09/16/18 2302 09/17/18 0300 09/17/18 0751 BP: 134/74 123/70 102/65 117/71 Pulse: 89 70 79 68 Resp: 20 20 18 18 Temp: 97.4 °F (36.3 °C) 97.6 °F (36.4 °C) 98.4 °F (36.9 °C) 98.9 °F (37.2 °C) SpO2: 94% 94% 92% 92% Weight: 118.8 kg (262 lb) Height: 6' (1.829 m) Physical Exam:  
GENERAL: alert, cooperative, no distress, appears stated age EYE: conjunctivae/corneas clear. PERRL. THROAT & NECK: normal and no erythema or exudates noted. LUNG: clear to auscultation bilaterally HEART: regular rate and rhythm, S1S2, no murmur, no JVD ABDOMEN: soft, RUQ tender, non-distended. Bowel sounds normal.  
EXTREMITIES:  No edema, 2+ pedal/radial pulses bilaterally SKIN: Mild jaundice NEUROLOGIC: A&Ox3. Cranial nerves 2-12 grossly intact. Lab/Data Review: 
Recent Results (from the past 24 hour(s)) METABOLIC PANEL, COMPREHENSIVE Collection Time: 09/17/18  4:42 AM  
Result Value Ref Range Sodium 140 136 - 145 mmol/L Potassium 3.3 (L) 3.5 - 5.1 mmol/L Chloride 102 98 - 107 mmol/L  
 CO2 27 21 - 32 mmol/L Anion gap 11 7 - 16 mmol/L Glucose 134 (H) 65 - 100 mg/dL BUN 21 8 - 23 MG/DL Creatinine 1.21 0.8 - 1.5 MG/DL  
 GFR est AA >60 >60 ml/min/1.73m2 GFR est non-AA >60 >60 ml/min/1.73m2 Calcium 8.7 8.3 - 10.4 MG/DL Bilirubin, total 5.4 (H) 0.2 - 1.1 MG/DL  
 ALT (SGPT) 294 (H) 12 - 65 U/L  
 AST (SGOT) 69 (H) 15 - 37 U/L Alk. phosphatase 171 (H) 50 - 136 U/L Protein, total 6.6 6.3 - 8.2 g/dL Albumin 2.8 (L) 3.2 - 4.6 g/dL Globulin 3.8 (H) 2.3 - 3.5 g/dL A-G Ratio 0.7 (L) 1.2 - 3.5    
CBC W/O DIFF Collection Time: 09/17/18  4:42 AM  
Result Value Ref Range WBC 9.9 4.3 - 11.1 K/uL  
 RBC 5.10 4.23 - 5.6 M/uL  
 HGB 14.6 13.6 - 17.2 g/dL HCT 42.9 41.1 - 50.3 % MCV 84.1 79.6 - 97.8 FL  
 MCH 28.6 26.1 - 32.9 PG  
 MCHC 34.0 31.4 - 35.0 g/dL  
 RDW 14.7 % PLATELET 396 871 - 335 K/uL MPV 9.3 (L) 9.4 - 12.3 FL ABSOLUTE NRBC 0.00 0.0 - 0.2 K/uL BILIRUBIN, DIRECT Collection Time: 09/17/18  9:39 AM  
Result Value Ref Range Bilirubin, direct 3.5 (H) <0.4 MG/DL Imaging: No results found. Cultures: All Micro Results None Assessment/Plan:  
 
Principal Problem: 
  Abdominal pain (9/17/2018) - Likely due to cholangitis - CT A/P unremarkable --> no pancreatitis or other issues - Lipase normal 
- Check RUQ U/S 
- Continue D5-0.45% saline - Check CMP daily - GI following --> may need ERCP Active Problems: Hyperbilirubinemia (9/16/2018) - Improving - Likely due to cholangitis - Check RUQ U/S 
- Continue D5-0.45% saline - Check CMP daily - GI following --> may need ERCP Elevated liver enzymes (9/17/2018) - Improving - Likely due to cholangitis - Check RUQ U/S 
- Continue D5-0.45% saline - Check CMP daily LEONCIO (acute kidney injury) ([facility] (9/17/2018) - Resolved - [facility].51 [facility] ER ---> now 1.21 
- Continue D5-0.45% saline Leukocytosis (9/17/2018) - Resolved (was 13.9 at [facility] ER) - Likely due to cholangitis - Continue Zosyn - Check CBC daily CAD (coronary artery disease) (12/19/2012) - No acute CP 
- Restart Lisinopril & Zocor once taking PO 
 
  HTN (hypertension) (12/19/2012) - BP currently stable off of medications - Will resume home BP meds once taking PO 
 
  HLD (hyperlipidemia) (12/19/2012) - Restart Zocor once taking PO 
 
  GERD (gastroesophageal reflux disease) (12/19/2012) - Start IV PPI daily Severe obesity (BMI 35.0-39.9) ([facility] (6/12/2018) - Consult nutrition for weight loss and s/p taylor diet recommendations Thank you for allowing us to participate in the care of this patient. We will be glad to take over as primary team of this patient due to him no longer needing surgical intervention. Signed By: Bob Parsons DO September 17, 2018

## 2018-09-17 NOTE — PROGRESS NOTES
END OF SHIFT NOTE: 
 
INTAKE/OUTPUT Voiding: YES Catheter: NO 
Drain:   
 
 
 
 
 
Flatus: Patient does have flatus present. Stool:  0 occurrences. Characteristics: 
  
 
Emesis: 0 occurrences. Characteristics: VITAL SIGNS Patient Vitals for the past 12 hrs: 
 Temp Pulse Resp BP SpO2  
09/17/18 0300 98.4 °F (36.9 °C) 79 18 102/65 92 % 09/16/18 2302 97.6 °F (36.4 °C) 70 20 123/70 94 % 09/16/18 2151 97.4 °F (36.3 °C) 89 20 134/74 94 % Pain Assessment Pain Intensity 1: 0 (09/16/18 2151) Patient Stated Pain Goal: 0 Ambulating Yes Slept  Long periods tonight. Shift report given to oncoming nurse at the bedside.  
 
Maude Alves RN

## 2018-09-17 NOTE — ED NOTES
Patient to ED with c/c ABD pain. Patient reports the pain as to the upper ABD. Patient reports the sensation as \"pressure. \" Patient reports onset of symptoms on Friday evening. Patient reports the pain kept him awoke that evening. Patient reports some relief yesterday though has retained soreness. Patient reports onset of fever on Saturday evening. Patient denies NV, does report some watery diarrhea. Patient went to [facility] this evening and was sent to [facility] with diagnosis Cholangitis.  . Receiving RN = Jerman.

## 2018-09-17 NOTE — ROUTINE PROCESS
TRANSFER - OUT REPORT: 
 
Verbal report given to Gagandeep Carmichael (name) on Kelvin Lerma  being transferred to 208 (unit) for routine progression of care Report consisted of patients Situation, Background, Assessment and  
Recommendations(SBAR). Information from the following report(s) ED Summary was reviewed with the receiving nurse. Opportunity for questions and clarification was provided. Patient transported with: 
 [employer]

## 2018-09-18 ENCOUNTER — APPOINTMENT (OUTPATIENT)
Dept: GENERAL RADIOLOGY | Age: 68
DRG: 445 | End: 2018-09-18
Attending: INTERNAL MEDICINE
Payer: MEDICARE

## 2018-09-18 ENCOUNTER — ANESTHESIA (OUTPATIENT)
Dept: ENDOSCOPY | Age: 68
DRG: 445 | End: 2018-09-18
Payer: MEDICARE

## 2018-09-18 LAB
ALBUMIN SERPL-MCNC: 2.5 G/DL (ref 3.2–4.6)
ALBUMIN/GLOB SERPL: 0.6 {RATIO} (ref 1.2–3.5)
ALP SERPL-CCNC: 153 U/L (ref 50–136)
ALT SERPL-CCNC: 183 U/L (ref 12–65)
ANION GAP SERPL CALC-SCNC: 9 MMOL/L (ref 7–16)
AST SERPL-CCNC: 41 U/L (ref 15–37)
BILIRUB SERPL-MCNC: 3.6 MG/DL (ref 0.2–1.1)
BUN SERPL-MCNC: 19 MG/DL (ref 8–23)
CALCIUM SERPL-MCNC: 8.5 MG/DL (ref 8.3–10.4)
CHLORIDE SERPL-SCNC: 101 MMOL/L (ref 98–107)
CO2 SERPL-SCNC: 28 MMOL/L (ref 21–32)
CREAT SERPL-MCNC: 1.29 MG/DL (ref 0.8–1.5)
ERYTHROCYTE [DISTWIDTH] IN BLOOD BY AUTOMATED COUNT: 14.8 %
GLOBULIN SER CALC-MCNC: 3.9 G/DL (ref 2.3–3.5)
GLUCOSE SERPL-MCNC: 116 MG/DL (ref 65–100)
HAV IGM SERPL QL IA: NEGATIVE
HBV CORE IGM SERPL QL IA: NEGATIVE
HBV SURFACE AG SERPL QL IA: NEGATIVE
HCT VFR BLD AUTO: 42.8 % (ref 41.1–50.3)
HCV AB S/CO SERPL IA: <0.1 S/CO RATIO (ref 0–0.9)
HGB BLD-MCNC: 14.2 G/DL (ref 13.6–17.2)
MCH RBC QN AUTO: 28.3 PG (ref 26.1–32.9)
MCHC RBC AUTO-ENTMCNC: 33.2 G/DL (ref 31.4–35)
MCV RBC AUTO: 85.3 FL (ref 79.6–97.8)
NRBC # BLD: 0 K/UL (ref 0–0.2)
PLATELET # BLD AUTO: 186 K/UL (ref 150–450)
PMV BLD AUTO: 9.6 FL (ref 9.4–12.3)
POTASSIUM SERPL-SCNC: 3.4 MMOL/L (ref 3.5–5.1)
PROT SERPL-MCNC: 6.4 G/DL (ref 6.3–8.2)
RBC # BLD AUTO: 5.02 M/UL (ref 4.23–5.6)
SODIUM SERPL-SCNC: 138 MMOL/L (ref 136–145)
WBC # BLD AUTO: 9 K/UL (ref 4.3–11.1)

## 2018-09-18 PROCEDURE — 36415 COLL VENOUS BLD VENIPUNCTURE: CPT

## 2018-09-18 PROCEDURE — 77030008477 HC STYL SATN SLP COVD -A: Performed by: ANESTHESIOLOGY

## 2018-09-18 PROCEDURE — 74011250636 HC RX REV CODE- 250/636

## 2018-09-18 PROCEDURE — 80053 COMPREHEN METABOLIC PANEL: CPT

## 2018-09-18 PROCEDURE — 65270000029 HC RM PRIVATE

## 2018-09-18 PROCEDURE — 76060000032 HC ANESTHESIA 0.5 TO 1 HR: Performed by: INTERNAL MEDICINE

## 2018-09-18 PROCEDURE — 77030009038 HC CATH BILI STN RTVR BSC -C: Performed by: INTERNAL MEDICINE

## 2018-09-18 PROCEDURE — 74011000258 HC RX REV CODE- 258: Performed by: SURGERY

## 2018-09-18 PROCEDURE — 74011250636 HC RX REV CODE- 250/636: Performed by: SURGERY

## 2018-09-18 PROCEDURE — 85027 COMPLETE CBC AUTOMATED: CPT

## 2018-09-18 PROCEDURE — 77030008703 HC TU ET UNCUF COVD -A: Performed by: ANESTHESIOLOGY

## 2018-09-18 PROCEDURE — 77030007288 HC DEV LOK BILI BSC -A: Performed by: INTERNAL MEDICINE

## 2018-09-18 PROCEDURE — 77030032490 HC SLV COMPR SCD KNE COVD -B: Performed by: INTERNAL MEDICINE

## 2018-09-18 PROCEDURE — 74011250637 HC RX REV CODE- 250/637: Performed by: INTERNAL MEDICINE

## 2018-09-18 PROCEDURE — 76040000026: Performed by: INTERNAL MEDICINE

## 2018-09-18 PROCEDURE — 74330 X-RAY BILE/PANC ENDOSCOPY: CPT

## 2018-09-18 PROCEDURE — 74011250636 HC RX REV CODE- 250/636: Performed by: ANESTHESIOLOGY

## 2018-09-18 PROCEDURE — 77030012595 HC SPHNTOM BILI BSC -D: Performed by: INTERNAL MEDICINE

## 2018-09-18 PROCEDURE — 74011000250 HC RX REV CODE- 250

## 2018-09-18 PROCEDURE — 77030039425 HC BLD LARYNG TRULITE DISP TELE -A: Performed by: ANESTHESIOLOGY

## 2018-09-18 PROCEDURE — 0FC98ZZ EXTIRPATION OF MATTER FROM COMMON BILE DUCT, VIA NATURAL OR ARTIFICIAL OPENING ENDOSCOPIC: ICD-10-PCS | Performed by: INTERNAL MEDICINE

## 2018-09-18 RX ORDER — LIDOCAINE HYDROCHLORIDE 20 MG/ML
INJECTION, SOLUTION EPIDURAL; INFILTRATION; INTRACAUDAL; PERINEURAL AS NEEDED
Status: DISCONTINUED | OUTPATIENT
Start: 2018-09-18 | End: 2018-09-18 | Stop reason: HOSPADM

## 2018-09-18 RX ORDER — FENTANYL CITRATE 50 UG/ML
INJECTION, SOLUTION INTRAMUSCULAR; INTRAVENOUS AS NEEDED
Status: DISCONTINUED | OUTPATIENT
Start: 2018-09-18 | End: 2018-09-18 | Stop reason: HOSPADM

## 2018-09-18 RX ORDER — ONDANSETRON 2 MG/ML
INJECTION INTRAMUSCULAR; INTRAVENOUS AS NEEDED
Status: DISCONTINUED | OUTPATIENT
Start: 2018-09-18 | End: 2018-09-18 | Stop reason: HOSPADM

## 2018-09-18 RX ORDER — SODIUM CHLORIDE 0.9 % (FLUSH) 0.9 %
5-10 SYRINGE (ML) INJECTION AS NEEDED
Status: DISCONTINUED | OUTPATIENT
Start: 2018-09-18 | End: 2018-09-18 | Stop reason: HOSPADM

## 2018-09-18 RX ORDER — SODIUM CHLORIDE, SODIUM LACTATE, POTASSIUM CHLORIDE, CALCIUM CHLORIDE 600; 310; 30; 20 MG/100ML; MG/100ML; MG/100ML; MG/100ML
100 INJECTION, SOLUTION INTRAVENOUS CONTINUOUS
Status: DISCONTINUED | OUTPATIENT
Start: 2018-09-18 | End: 2018-09-18 | Stop reason: HOSPADM

## 2018-09-18 RX ORDER — ROCURONIUM BROMIDE 10 MG/ML
INJECTION, SOLUTION INTRAVENOUS AS NEEDED
Status: DISCONTINUED | OUTPATIENT
Start: 2018-09-18 | End: 2018-09-18 | Stop reason: HOSPADM

## 2018-09-18 RX ORDER — SUCCINYLCHOLINE CHLORIDE 20 MG/ML
INJECTION INTRAMUSCULAR; INTRAVENOUS AS NEEDED
Status: DISCONTINUED | OUTPATIENT
Start: 2018-09-18 | End: 2018-09-18 | Stop reason: HOSPADM

## 2018-09-18 RX ORDER — PROPOFOL 10 MG/ML
INJECTION, EMULSION INTRAVENOUS AS NEEDED
Status: DISCONTINUED | OUTPATIENT
Start: 2018-09-18 | End: 2018-09-18 | Stop reason: HOSPADM

## 2018-09-18 RX ADMIN — PIPERACILLIN SODIUM,TAZOBACTAM SODIUM 3.38 G: 3; .375 INJECTION, POWDER, FOR SOLUTION INTRAVENOUS at 05:57

## 2018-09-18 RX ADMIN — FAMOTIDINE 20 MG: 10 INJECTION, SOLUTION INTRAVENOUS at 08:38

## 2018-09-18 RX ADMIN — Medication 10 ML: at 21:47

## 2018-09-18 RX ADMIN — FAMOTIDINE 20 MG: 10 INJECTION, SOLUTION INTRAVENOUS at 20:15

## 2018-09-18 RX ADMIN — ROCURONIUM BROMIDE 5 MG: 10 INJECTION, SOLUTION INTRAVENOUS at 14:00

## 2018-09-18 RX ADMIN — DEXTROSE MONOHYDRATE, SODIUM CHLORIDE, AND POTASSIUM CHLORIDE 100 ML/HR: 50; 4.5; 1.49 INJECTION, SOLUTION INTRAVENOUS at 03:28

## 2018-09-18 RX ADMIN — LIDOCAINE HYDROCHLORIDE 100 MG: 20 INJECTION, SOLUTION EPIDURAL; INFILTRATION; INTRACAUDAL; PERINEURAL at 14:00

## 2018-09-18 RX ADMIN — DEXTROSE MONOHYDRATE, SODIUM CHLORIDE, AND POTASSIUM CHLORIDE 100 ML/HR: 50; 4.5; 1.49 INJECTION, SOLUTION INTRAVENOUS at 21:46

## 2018-09-18 RX ADMIN — FENTANYL CITRATE 100 MCG: 50 INJECTION, SOLUTION INTRAMUSCULAR; INTRAVENOUS at 14:00

## 2018-09-18 RX ADMIN — PROPOFOL 200 MG: 10 INJECTION, EMULSION INTRAVENOUS at 14:00

## 2018-09-18 RX ADMIN — SUCCINYLCHOLINE CHLORIDE 180 MG: 20 INJECTION INTRAMUSCULAR; INTRAVENOUS at 14:00

## 2018-09-18 RX ADMIN — PIPERACILLIN SODIUM,TAZOBACTAM SODIUM 3.38 G: 3; .375 INJECTION, POWDER, FOR SOLUTION INTRAVENOUS at 16:22

## 2018-09-18 RX ADMIN — PIPERACILLIN SODIUM,TAZOBACTAM SODIUM 3.38 G: 3; .375 INJECTION, POWDER, FOR SOLUTION INTRAVENOUS at 21:47

## 2018-09-18 RX ADMIN — ONDANSETRON 4 MG: 2 INJECTION INTRAMUSCULAR; INTRAVENOUS at 14:41

## 2018-09-18 RX ADMIN — SODIUM CHLORIDE, SODIUM LACTATE, POTASSIUM CHLORIDE, AND CALCIUM CHLORIDE 100 ML/HR: 600; 310; 30; 20 INJECTION, SOLUTION INTRAVENOUS at 13:25

## 2018-09-18 RX ADMIN — Medication 5 ML: at 05:57

## 2018-09-18 RX ADMIN — INDOMETHACIN 100 MG: 50 SUPPOSITORY RECTAL at 14:43

## 2018-09-18 NOTE — ANESTHESIA POSTPROCEDURE EVALUATION
Post-Anesthesia Evaluation and Assessment Patient: Karley Riley MRN: [id number]  SSN: [id number] YOB: 1950  Age: 75 y.o. Sex: male Cardiovascular Function/Vital Signs Visit Vitals  /76 (BP 1 Location: Left arm, BP Patient Position: At rest)  Pulse (!) 56  Temp 37.1 °C (98.8 °F)  Resp 18  Ht 6' (1.829 m)  Wt 118.8 kg (262 lb)  SpO2 96%  BMI 35.53 kg/m2 Patient is status post general anesthesia for Procedure(s): ENDOSCOPIC RETROGRADE CHOLANGIOPANCREATOGRAPHY (ERCP)  BMI 35 ROOM 208 ENDOSCOPIC SPHINCTEROTOMY 
ENDOSCOPIC STONE EXTRACTION/BALLOON SWEEP. Nausea/Vomiting: None Postoperative hydration reviewed and adequate. Pain: 
Pain Scale 1: Numeric (0 - 10) (09/18/18 1522) Pain Intensity 1: 0 (09/18/18 1522) Managed Neurological Status:  
Neuro (WDL): Within Defined Limits (09/18/18 1522) At baseline Mental Status and Level of Consciousness: Arousable Pulmonary Status:  
O2 Device: Room air (09/18/18 1522) Adequate oxygenation and airway patent Complications related to anesthesia: None Post-anesthesia assessment completed. No concerns Signed By: Quan Lyman MD   
 September 18, 2018

## 2018-09-18 NOTE — PROGRESS NOTES
H&P/Consult Note/Progress Note/Office Note:  
Teresa Carr  MRN: [id number]  :1950  Age:72 y.o. 
 
HPI: Teresa Carr is a 75 y.o. male is s/p lap cholecystectomy on 18. He was transferred to [facility] from [facility] after presenting with fever, epigastric pain, and Tbili of 6. We were contacted being he was s/p cholecystectomy for recommendations and subsequently had him sent here. His CT at [facility] did not have acute findings and is shown below. He is currently in NAD but continues to report epigastric pain that radiates to his shoulder. He denies nausea or vomiting. He further denies difficulty moving his bowels. Abdominal pain has no direct relationship with PO intake. On admission he had Tbili is 5.4, , AST 69, ALP 69, AF, VSS. Previous history: He missed his final follow-up visit with Dr. Danielle Fuentes on 2018. Came in the week prior and had his skin clips removed with our nurse. He got the impression he did not need to come back. Dr Danielle Fuentes called him at home on 18 and pt was doing well with no issues. Prior to surgery he reported a 3 year h/o of intermittent episodes of RUQ pain which was worse just before surgery. He was on omeprazole for remote history of gastritis but this did not alleviate his symptoms or prevent them from happening. Additional antacids were also not helping. He denied any associated nausea vomiting fevers or chills. He was not on blood thinners. He had a prior umbilical hernia repair at [facility] in approximately  (He did not know if mesh was used). He reported a history of peptic ulcer disease from age 14. He has seen gastroenterology but does not remember the name of his gastroenterologist.  
He reported a prior unremarkable colonoscopy but reported this is more than 5 years ago. He reported a prior CT from approximately  at [facility] which identified gallstones. 12/27/16 U/S abd Hx:  Abd distention, hx gallstones 
  The liver is slightly echogenic. There are no discrete lesions in the visualized portions of the liver, pancreas, or spleen. There is no bile duct 
dilatation. The common bile duct measures 6 mm. There are multiple small stones in the gallbladder. .  There is no significant wall thickening. The gallbladder is not distended. 
  
The right kidney measures 11.7 cm in length. The left kidney measures 12.9 cm. There is no hydronephrosis. There is no evidence of a renal mass. There is no ascites. The aorta and inferior vena cava are normal in caliber. 
  
IMPRESSION: Cholelithiasis 6/22/18 RUQ U/S (pre-op) Hx: RUQ pain, h/o gallstones. 
  
FINDINGS: Pancreas is grossly unremarkable although the tail is partially obscured by overlying bowel gas. Aorta is normal caliber, 2.0 cm. The IVC is patent. The normal sized spleen has a homogenous echotexture and measures 12.3 cm. Left kidney contains a 15 mm simple appearing cyst, otherwise unremarkable without hydronephrosis and measures 12.1 cm. Right kidney is unremarkable without hydronephrosis and measures 12 cm. Renal echogenicity is normal, the right kidney is hypoechoic to the liver. The intrahepatic biliary tree is not dilated. There is hepatopetal flow in the portal vein. No gross focal parenchymal abnormality identified within the liver. The gallbladder wall is thickened, 3 - 6 mm. Multiple small echogenic shadowing mobile gallstones. Negative Izaguirre's sign. The common bile duct is not dilated 6 mm.  
  
IMPRESSION: Cholelithiasis and gallbladder wall thickening. No acute cholecystitis or biliary tree obstruction 9/16/18 CT abd/pelvis --[facility] Findings: There are mild bibasilar lung opacities consistent with atelectasis.  No pneumothorax or pleural effusion are seen. Coronary artery calcifications are present.  
The liver, spleen, and bilateral adrenal glands are within normal limits for size. There is atrophy of the pancreas. Cholecystectomy clips are present in the right upper quadrant.  No significant fluid collection is seen within the right upper quadrant. Review of both kidneys demonstrates no evidence of hydronephrosis or hydroureter.   
There is a benign-appearing cyst arising from the upper pole of the left kidney. The bladder suboptimally distended limiting evaluation. Prostate calcifications are present. Review of the bowel demonstrates a nonobstructive bowel gas pattern.  There are multiple uncomplicated diverticuli within the distal large bowel.  The appendix is not visualized. There is no evidence of free air within the abdomen or pelvis. There are vascular calcifications of the aorta and associated vessels.  No abdominal aortic aneurysm is seen. Review of the osseous structures demonstrates degenerative changes of the thoracolumbar spine and pelvis. Impression: 1.  No evidence of acute process. 2.  Other findings as discussed above. 9/17/18 Abd U/S Hx: Prior laparoscopic cholecystectomy with elevated liver function tests. COMPARISON: 22 June 2018. 
  
FINDINGS: Pancreas obscured by overlying bowel gas. Aorta is normal caliber, 1.8 
cm. The IVC is patent. The normal sized spleen has a homogenous echotexture and 
measures 12 cm. Left kidney is unremarkable without hydronephrosis and measures 12.8 cm. Right kidney is unremarkable without hydronephrosis and measures 12.1 
cm. Renal echogenicity is normal, the right kidney is hypoechoic to the liver. The intrahepatic biliary tree is not dilated. There is hepatopetal flow in the 
portal vein. No gross focal parenchymal abnormality identified within the liver. Increased liver echogenicity suggesting fatty infiltration. The gallbladder is 
absent. The common bile duct is not dilated 6 mm.  
  
IMPRESSION: Negative for biliary tree obstruction. Increased liver echogenicity suggesting fatty infiltration 9/18/18 more comfortable; denies pain; U/S yesterday showed no biliary ductal dilation; LFTs still abnormal but better; ERCP planned Past Medical History:  
Diagnosis Date  CAD (coronary artery disease) 12/19/2012  ED (erectile dysfunction) 12/19/2012  GERD (gastroesophageal reflux disease) 12/19/2012  
 managed by meds  HLD (hyperlipidemia) 12/19/2012  
 HTN (hypertension) 12/19/2012  
 managed by meds  Psychiatric disorder   
 managed by meds Past Surgical History:  
Procedure Laterality Date  HX COLONOSCOPY    
 and EGD  HX HEART CATHETERIZATION    
 HX HERNIA REPAIR    
 umbilical  
 
Current Facility-Administered Medications Medication Dose Route Frequency  famotidine (PF) (PEPCID) injection 20 mg  20 mg IntraVENous Q12H  
 sodium chloride (NS) flush 5-10 mL  5-10 mL IntraVENous Q8H  
 sodium chloride (NS) flush 5-10 mL  5-10 mL IntraVENous PRN  
 dextrose 5% - 0.45% NaCl with KCl 20 mEq/L infusion  100 mL/hr IntraVENous CONTINUOUS  
 acetaminophen (TYLENOL) tablet 650 mg  650 mg Oral Q4H PRN  
 oxyCODONE-acetaminophen (PERCOCET) 5-325 mg per tablet 1 Tab  1 Tab Oral Q4H PRN  
 HYDROmorphone (PF) (DILAUDID) injection 0.5 mg  0.5 mg IntraVENous Q4H PRN  
 naloxone (NARCAN) injection 0.4 mg  0.4 mg IntraVENous PRN  
 diphenhydrAMINE (BENADRYL) injection 12.5 mg  12.5 mg IntraVENous Q4H PRN  
 ondansetron (ZOFRAN) injection 4 mg  4 mg IntraVENous Q4H PRN  piperacillin-tazobactam (ZOSYN) 3.375 g in 0.9% sodium chloride (MBP/ADV) 100 mL  3.375 g IntraVENous Q8H  
 influenza vaccine 2018-19 (6 mos+)(PF) (FLUARIX QUAD/FLULAVAL QUAD) injection 0.5 mL  0.5 mL IntraMUSCular PRIOR TO DISCHARGE Review of patient's allergies indicates no known allergies. Social History Social History  Marital status:  Spouse name: N/A  
 Number of children: N/A  
 Years of education: N/A Social History Main Topics  Smoking status: Never Smoker  Smokeless tobacco: Never Used  Alcohol use No  
 Drug use: No  
 Sexual activity: Not on file Other Topics Concern  Not on file Social History Narrative History Smoking Status  Never Smoker Smokeless Tobacco  
 Never Used Family History Problem Relation Age of Onset  Hypertension Mother  Cancer Father Skin  Diabetes Other Cousin ROS: The patient has no difficulty with chest pain or shortness of breath. No fever or chills. Comprehensive review of systems was otherwise unremarkable except as noted above. Physical Exam: (from most recent visit) Visit Vitals  /66 (BP 1 Location: Left arm, BP Patient Position: At rest)  Pulse 77  Temp 98.7 °F (37.1 °C)  Resp 18  Ht 6' (1.829 m)  Wt 262 lb (118.8 kg)  SpO2 93%  BMI 35.53 kg/m2 Constitutional: Alert, oriented, cooperative patient in no acute distress; appears stated age Eyes:Sclera are clear. EOMs intact ENMT: no external lesions gross hearing normal; no obvious neck masses, no ear or lip lesions, nares normal 
CV: RRR. Normal perfusion Resp: No JVD. Breathing is  non-labored; no audible wheezing. GI: soft and non-distended; less abd tenderness; no rebound, no guarding Musculoskeletal: unremarkable with normal function. No embolic signs or cyanosis. Neuro:  Oriented; moves all 4; no focal deficits Psychiatric: normal affect and mood, no memory impairment Recent vitals (if inpt): 
Patient Vitals for the past 24 hrs: 
 BP Temp Pulse Resp SpO2  
09/18/18 0648 111/66 98.7 °F (37.1 °C) 77 18 93 % 09/18/18 0400 124/74 98.4 °F (36.9 °C) 90 18 91 % 09/17/18 2330 94/59 98.9 °F (37.2 °C) 80 18 95 % 09/17/18 1930 114/67 98.2 °F (36.8 °C) 77 16 91 % 09/17/18 1528 134/74 98.7 °F (37.1 °C) 71 16 91 % Labs: 
Recent Labs  
   09/18/18 
 0421  09/17/18 
 1232  09/17/18 
 9302 WBC  9.0   --    --   
HGB  14.2   --    --   
PLT  186   --    --   
 NA  138   --    --   
K  3.4*   --    --   
CL  101   --    --   
CO2  28   --    --   
BUN  19   --    --   
CREA  1.29   --    --   
GLU  116*   --    --   
PTP   --   14.3   --   
INR   --   1.2   --   
TBILI  3.6*   --    --   
CBIL   --    --   3.5* SGOT  41*   --    --   
ALT  183*   --    --   
AP  153*   --    --   
 
 
Lab Results Component Value Date/Time WBC 9.0 09/18/2018 04:21 AM  
 HGB 14.2 09/18/2018 04:21 AM  
 PLATELET 710 45/66/0363 04:21 AM  
 Sodium 138 09/18/2018 04:21 AM  
 Potassium 3.4 (L) 09/18/2018 04:21 AM  
 Chloride 101 09/18/2018 04:21 AM  
 CO2 28 09/18/2018 04:21 AM  
 BUN 19 09/18/2018 04:21 AM  
 Creatinine 1.29 09/18/2018 04:21 AM  
 Glucose 116 (H) 09/18/2018 04:21 AM  
 INR 1.2 09/17/2018 12:32 PM  
 Bilirubin, total 3.6 (H) 09/18/2018 04:21 AM  
 Bilirubin, direct 3.5 (H) 09/17/2018 09:39 AM  
 AST (SGOT) 41 (H) 09/18/2018 04:21 AM  
 ALT (SGPT) 183 (H) 09/18/2018 04:21 AM  
 Alk. phosphatase 153 (H) 09/18/2018 04:21 AM  
 
 
I reviewed recent labs and recent radiologic studies. I independently reviewed radiology images for studies I described above or studies I have ordered. Admission date (for inpatients): 9/16/2018 [unfilled]  [unfilled] ASSESSMENT/PLAN: 
Problem List  Date Reviewed: 7/23/2018 Codes Class Noted Abdominal pain ICD-10-CM: R10.9 ICD-9-CM: 789.00  9/17/2018 Elevated liver enzymes ICD-10-CM: R74.8 ICD-9-CM: 790.5  9/17/2018 LEONCIO (acute kidney injury) ([facility] ICD-10-CM: N17.9 ICD-9-CM: 584.9  9/17/2018 Leukocytosis ICD-10-CM: A04.118 ICD-9-CM: 288.60  9/17/2018 * (Principal)Biliary obstruction ICD-10-CM: K83.1 ICD-9-CM: 576.2  9/17/2018 Cholangitis ICD-10-CM: K83.0 ICD-9-CM: 576.1  9/17/2018 Hyperbilirubinemia ICD-10-CM: E80.6 ICD-9-CM: 782.4  9/16/2018 Chronic calculous cholecystitis ICD-10-CM: K80.10 ICD-9-CM: 574.10  6/27/2018 Overview Addendum 7/15/2018  9:15 AM by Nena Alicea MD  
  7/6/18 s/p lap taylor; Dr Marlena Browning DIAGNOSIS GALLBLADDER: CHRONIC CHOLECYSTITIS; CHOLELITHIASIS. Electronically signed out on 7/8/2018 17:43 by Felicitas Lawson MD  
Microscopic Description : Chronic inflammation is seen in the wall of the gallbladder. No dysplasia is identified. Dr. Anh Yee concurs Severe obesity (BMI 35.0-39.9) ([facility] ICD-10-CM: E66.01 
ICD-9-CM: 278.01  6/12/2018 HLD (hyperlipidemia) ICD-10-CM: E78.5 ICD-9-CM: 272.4  12/19/2012 HTN (hypertension) ICD-10-CM: I10 
ICD-9-CM: 401.9  12/19/2012 CAD (coronary artery disease) ICD-10-CM: I25.10 ICD-9-CM: 414.00  12/19/2012 ED (erectile dysfunction) ICD-10-CM: N52.9 ICD-9-CM: 607.84  12/19/2012 GERD (gastroesophageal reflux disease) ICD-10-CM: K21.9 ICD-9-CM: 530.81  12/19/2012 Principal Problem: 
  Biliary obstruction (9/17/2018) Active Problems: 
  HLD (hyperlipidemia) (12/19/2012) HTN (hypertension) (12/19/2012) CAD (coronary artery disease) (12/19/2012) GERD (gastroesophageal reflux disease) (12/19/2012) Severe obesity (BMI 35.0-39.9) ([facility] (6/12/2018) Hyperbilirubinemia (9/16/2018) Abdominal pain (9/17/2018) Elevated liver enzymes (9/17/2018) LEONCIO (acute kidney injury) ([facility] (9/17/2018) Leukocytosis (9/17/2018) Cholangitis (9/17/2018) He was doing well s/p lap cholecystectomy on 7/6/18 up to this admission Now has findings concerning for partial biliary obstruction and possible early cholangitis. CT and U/S show no biliary ductal dilation, mass, or abscess. No surgical pathology so far LFTs better this am 
Hepatitis panel pending GI planning ERCP to clear [facility]ist may elect to take over as primary If so, we will sign off Signed:  Nena Alicea MD

## 2018-09-18 NOTE — PROGRESS NOTES
TRANSFER - IN REPORT: 
 
Verbal report received from [address]  on Jay Patel  being received from PACU for routine post - op Report consisted of patients Situation, Background, Assessment and  
Recommendations(SBAR). Information from the following report(s) SBAR, Kardex, Procedure Summary, Intake/Output, MAR and Recent Results was reviewed with the receiving nurse. Opportunity for questions and clarification was provided. Assessment completed upon patients arrival to unit and care assumed.

## 2018-09-18 NOTE — PROGRESS NOTES
Raynald Frederickson called about diet order for patient. Dr. Pato Bryant called and stated patient could start clear liquid diet. Gerson informed and order placed.

## 2018-09-18 NOTE — PROGRESS NOTES
Bedside shift change report given to Herminio Colvin RN (oncoming nurse) by Renae Gonzales RN (offgoing nurse). Report included the following information SBAR, Kardex, Procedure Summary, Intake/Output, MAR, Recent Results and Med Rec Status.

## 2018-09-18 NOTE — INTERVAL H&P NOTE
H&P Update: 
Adrián Brooks was seen and examined. History and physical has been reviewed. The patient has been examined.  There have been no significant clinical changes since the completion of the originally dated History and Physical. 
 
Signed By: Colby Colby MD   
 September 18, 2018 1:46 PM

## 2018-09-18 NOTE — H&P (VIEW-ONLY)
[facility] Consult Note Primary GI Physician: Lul Benavides Referring Physician:  Leonardo Vidal NP Consult Date:  9/17/2018 Admit Date:  9/16/2018 Chief Complaint:  Cholangitis Subjective:  
 
History of Present Illness:  Patient is a 75 y.o. male with PMH of CAD, GERD, PUD, HTN, HLD, hx of gallstones s/p cholecystectomy, who is seen in consultation at the request of Leonardo Vidal NP for cholangitis. He states he began having increased pressure pain and bloating over his upper abdomen on Friday, radiating to his back and right shoulder, becoming more severe, constant. He had associated nausea and fever and chills off and on. He did not have much of an appetite, only tolerating Ensure type drinks over the weekend. He had increased itching Saturday and darker urine, as well as yellowing of his skin. He presented to the [facility] ER on Sunday due to his persistent symptoms and was noted to have elevated WBC and LFTs with negative CT scan. Given concern for cholangitis and his recent cholecystectomy about 10 weeks ago at [facility], he was transferred here. He was given a dose of Zosyn an Flagyl at [facility], and his WBC has improved. He began having some improvement in the pain Saturday, but has lingering soreness and fevers. He denies any history of liver disease or family history of liver disease. He denies any ETOH or drug use. He has had regular BMs, and denies any bloody or black stools. He denies any heartburn, chest pain, chronic cough, hoarseness, or difficulty swallowing. He states he had a colonoscopy over 10 yrs ago in [place], [place] with a negative exam. 
 
PMH: 
Past Medical History:  
Diagnosis Date  CAD (coronary artery disease) 12/19/2012  ED (erectile dysfunction) 12/19/2012  GERD (gastroesophageal reflux disease) 12/19/2012  
 managed by meds  HLD (hyperlipidemia) 12/19/2012  
 HTN (hypertension) 12/19/2012  
 managed by meds  Psychiatric disorder managed by meds Occasional skipped heartbeat - with negative cardiac cath and stress test per pt PUD Hx of gallstones s/p cholecystectomy He states he had a colonoscopy over 10 yrs ago in [place], [place] with a negative exam. 
 
PSH: 
Past Surgical History:  
Procedure Laterality Date  HX COLONOSCOPY    
 and EGD  HX HEART CATHETERIZATION    
 HX HERNIA REPAIR    
 umbilical  
 
 
Allergies: 
No Known Allergies Home Medications: 
Prior to Admission medications Medication Sig Start Date End Date Taking? Authorizing Provider  
tamsulosin (FLOMAX) 0.4 mg capsule Take 0.4 mg by mouth daily. Yes Historical Provider  
aspirin delayed-release 81 mg tablet Take 81 mg by mouth nightly. Yes Historical Provider  
citalopram (CELEXA) 20 mg tablet Take 1 Tab by mouth daily. 6/12/18  Yes Dave Amaya MD  
clonazePAM (KLONOPIN) 1 mg tablet Take 1 Tab by mouth three (3) times daily. Max Daily Amount: 3 mg. Patient taking differently: Take 1 mg by mouth daily. And two times a day prn 6/12/18  Yes Dave Amaya MD  
lisinopril-hydroCHLOROthiazide (PRINZIDE, ZESTORETIC) 20-25 mg per tablet Take 1 Tab by mouth daily. 6/12/18  Yes Dave Amaya MD  
metoprolol succinate (TOPROL-XL) 100 mg tablet Take 1 Tab by mouth daily. Patient taking differently: Take 100 mg by mouth nightly. 6/12/18  Yes Dave Amaya MD  
omeprazole (PRILOSEC) 40 mg capsule Take 1 Cap by mouth daily. 6/12/18  Yes Dave Amaya MD  
simvastatin (ZOCOR) 40 mg tablet Take 1 Tab by mouth nightly. 6/12/18  Yes Dave Amaya MD  
 
 
Hospital Medications: 
Current Facility-Administered Medications Medication Dose Route Frequency  sodium chloride (NS) flush 5-10 mL  5-10 mL IntraVENous Q8H  
 sodium chloride (NS) flush 5-10 mL  5-10 mL IntraVENous PRN  
 dextrose 5% - 0.45% NaCl with KCl 20 mEq/L infusion  100 mL/hr IntraVENous CONTINUOUS  
 acetaminophen (TYLENOL) tablet 650 mg  650 mg Oral Q4H PRN  
  oxyCODONE-acetaminophen (PERCOCET) 5-325 mg per tablet 1 Tab  1 Tab Oral Q4H PRN  
 HYDROmorphone (PF) (DILAUDID) injection 0.5 mg  0.5 mg IntraVENous Q4H PRN  
 naloxone (NARCAN) injection 0.4 mg  0.4 mg IntraVENous PRN  
 diphenhydrAMINE (BENADRYL) injection 12.5 mg  12.5 mg IntraVENous Q4H PRN  
 ondansetron (ZOFRAN) injection 4 mg  4 mg IntraVENous Q4H PRN  piperacillin-tazobactam (ZOSYN) 3.375 g in 0.9% sodium chloride (MBP/ADV) 100 mL  3.375 g IntraVENous Q8H  
 influenza vaccine 2018-19 (6 mos+)(PF) (FLUARIX QUAD/FLULAVAL QUAD) injection 0.5 mL  0.5 mL IntraMUSCular PRIOR TO DISCHARGE Social History: 
Social History Substance Use Topics  Smoking status: Never Smoker  Smokeless tobacco: Never Used  Alcohol use No  
 
Pt is . He denies any history of drug use. Family History: 
Family History Problem Relation Age of Onset  Hypertension Mother  Cancer Father Skin  Diabetes Other Cousin Review of Systems: A detailed 10 system ROS is obtained, with pertinent positives as listed above. All others are negative. Diet:  NPO Objective:  
 
Physical Exam: 
Vitals: 
Visit Vitals  /71  Pulse 68  Temp 98.9 °F (37.2 °C)  Resp 18  Ht 6' (1.829 m)  Wt 118.8 kg (262 lb)  SpO2 92%  BMI 35.53 kg/m2 Gen:  Pt is alert, cooperative, no acute distress, sitting in chair at bedside, slight jaundice Skin:  Extremities and face reveal no rashes. HEENT: Sclerae icteric very slightly. Extra-occular muscles are intact. No oral ulcers. No abnormal pigmentation of the lips. The neck is supple. Cardiovascular: Regular rate and rhythm. No murmurs, gallops, or rubs. Respiratory:  Comfortable breathing with no accessory muscle use. Clear breath sounds anteriorly with no wheezes, rales, or rhonchi. GI:  Abdomen nondistended, soft, and tender over RUQ.   Normal active bowel sounds. No enlargement of the liver or spleen. No masses palpable. Rectal:  Deferred Musculoskeletal:  No pitting edema of the lower legs. Neurological:  Gross memory appears intact. Patient is alert and oriented. Psychiatric:  Mood appears appropriate with judgement intact. Lymphatic:  No cervical or supraclavicular adenopathy. Laboratory:   
Recent Labs  
   09/17/18 
 6646 WBC  9.9 HGB  14.6 HCT  42.9 PLT  185 MCV  84.1 NA  140  
K  3.3*  
CL  102 CO2  27 BUN  21  
CREA  1.21  
CA  8.7 GLU  134* AP  171* SGOT  69* ALT  294* TBILI  5.4* ALB  2.8*  
TP  6.6 Labs from [facility] 16 Sept 2018: 
Blood Gas Blood Gas Component Value Ref Range Performed At [address] [place] [address] LAB Specimen Type, Blood Gas Venous   [address] [facility] Patient Temperature 98.6 98.5 - [facility] [address] LAB Lactate 1.5 0.5 - 2.2 mmol/L [address] [facility] Complete Blood Count Complete Blood Count Component Value Ref Range Performed At WBC 13.9 (H) 4.0 - 11.5 TH/mm3 [address] [facility]  
RBC 5.88 4.50 - 5.90 [address] [facility] Hemoglobin 16.7 13.5 - 17.5 g/dL [address] [facility] Hematocrit 50.0 41.0 - 53.0 % [address] [facility]  
MCV 85.0 80.0 - 100.0 fL [address] [facility]  
MCH 28.4 26.0 - 34.0 pg [address] [facility]  
MCHC 33.3 31.0 - 37.0 g/dL [address] [facility]  
RDW 15.3 12.0 - 16.0 % [address] [facility] Platelets Õli 68 Th/mm3 [address] [facility] MPV 7.6 6.9 - 10.6 fL [address] [facility] Neutrophils, % 86.5 % [address] [facility] Lymphocytes, % 6.6 % [address] [facility] Monocytes % 6.1 % [address] [facility] Eosinophils % 0.2 % [address] [facility] Basophils % 0.6 % [address] [facility] Neutrophils, Abs 12.0 (H) 1.4 - 6.6 TH/mm3 [address] [facility] Lymphocytes, Abs 0.9 (L) 1.0 - 3.5 TH/mm3 [address] [facility] Monocytes Abs 0.9 <1.0 TH/mm3 [address] [facility] Eosinophils, Abs 0.0 <0.7 TH/mm3 [address] [facility] Basophils, Abs 0.1 (H) <0.1 TH/mm3 [address] [facility] Lipase Lipase Component Value Ref Range Performed At Lipase 15 8 - 78 U/L [facility] Comprehen Metabolic Panel (CMP) Comprehen Metabolic Panel (CMP) Component Value Ref Range Performed At Sodium 136 133 - 144 mmol/L [facility] Potassium 3.4 3.4 - 5.1 mmol/L [address] [facility] Chloride 98 (L) 101 - 111 mmol/L [address] [facility]  
CO2 24 20 - 30 mmol/L [address] [facility] Anion Gap 14 6 - 16 mmol/L [facility]  
BUN 22 8 - 26 mg/dL [facility] Creatinine 1.51 (H)Comment: Specimen icteric, results will be falsely decreased. 0.72 - 1.25 mg/dL [address] [facility] Glucose 130 (H) 82 - 99 mg/dL [address] [facility] Calcium 10.4 8.5 - 10.4 mg/dL [facility]  
 (H) 5 - 51 U/L [facility]  (H) 1 - 55 U/L [facility] Alkaline Phosphatase 194 (H) 32 - 125 U/L [facility] Protein, Total 7.5 6.0 - 8.3 g/dL [facility] Albumin 3.5 3.4 - 4.8 g/dL [facility] Bilirubin, Total 6.2 (H) 0.1 - 1.2 mg/dL [facility]  
eGFR Non-African American 47 (L) >59 mL/min [address] [facility]  
eGFR  54 (L) >59 mL/min [address] [facility] Urine Microscopic Urine Microscopic Component Value Ref Range Performed At WBC, UA 5-10 (A) 0 , <1, 1-3, None Seen /HPF [facility]  
RBC, UA 3-5 (A) 0 , 1 , 1-2, None Seen, <1 [address] LAB Bacteria, UA 1+ (A) None Seen [facility] [address] [facility] Coarse Granular Casts, UA 1-5 (A) None Seen /LPF [facility] LAB Back to top of Lab Results Urinalysis w/Reflex Microscopic Urinalysis w/Reflex Microscopic Component Value Ref Range Performed At Color, UA Dark Yellow   [address] [facility] Clarity, [address] LAB Specific [place], UA 1.010 1.003 - 1.040 [address] [facility]  
pH, UA 6.0 4.6 - 8.0 [address] [facility] Leukocyte Esterase, UA Negative Negative [address] [facility] Nitrite, UA Negative Negative [address] [facility] Protein, UA Trace (A) Negative mg/dL [address] [facility] Glucose, UA Negative Negative mg/dl [address] [facility] Ketones, UA Negative Negative mg/dL [address] [facility] Urobilinogen, UA 1.0 <2.0 E.U./dL [address] [facility] Blood, UA Small (A) Negative [address] [facility] Bilirubin, UA Moderate (A) Negative mg/dL [address] [facility]  
 
CT ABDOMEN PELVIS W CONTRAST ACCESSION NO: [id number] ORDERED BY: RUPALI IZQUIERDO DO 
DATE OF EXAM: 09/16/2018 18:10 
REASON FOR EXAM: [place] only, abd pain, fever, hx recent GB removal 
ADMISSION DATE: 09/16/2018 17:27 CT of the abdomen and pelvis was performed following intravenous administration of 100 ml of Omnipaque 350 intravenous contrast. 
Clinical Indication: Abdominal pain. Fever. History of recent cholecystectomy. Comparison: None Findings: There are mild bibasilar lung opacities consistent with atelectasis. No pneumothorax or pleural effusion are seen. Coronary artery calcifications are present. The liver, spleen, and bilateral adrenal glands are within normal limits for size. There is atrophy of the pancreas. Cholecystectomy clips are present in the right upper quadrant. No significant fluid collection is seen within the right upper quadrant. Review of both kidneys demonstrates no evidence of hydronephrosis or hydroureter. There is a benign-appearing cyst arising from the upper pole of the left kidney. The bladder suboptimally distended limiting evaluation. Prostate calcifications are present. Review of the bowel demonstrates a nonobstructive bowel gas pattern. There are multiple uncomplicated diverticuli within the distal large bowel. The appendix is not visualized. There is no evidence of free air within the abdomen or pelvis. There are vascular calcifications of the aorta and associated vessels. No abdominal aortic aneurysm is seen. Review of the osseous structures demonstrates degenerative changes of the thoracolumbar spine and pelvis. Impression: 1. No evidence of acute process. 2. Other findings as discussed above. : genevieve TRANSCRIBE TIME/DATE: 09/16/2018 06:39 pm 
READ BY: Bhavesh Aguilera MD 
THIS IS AN ELECTRONICALLY VERIFIED REPORT  
9/16/2018 6:39 PM: Bhavesh Aguilera MD  
 
Assessment:  
 
Principal Problem: Hyperbilirubinemia (9/16/2018) 76 yo male with PMH of CAD, GERD, PUD, HTN, HLD, hx of gallstones s/p cholecystectomy, who is seen in consultation at the request of Arie Koyanagi, NP for cholangitis, after transferred to [facility] from [facility] after presenting with increased upper abd pain, nausea, fevers, and noted to have leukocytosis and elevated LFTs on labs with negative CT scan. Concern for cholangitis given findings and history of recent cholecystectomy with gallstones. He is feeling a little better today. US pending to assess bile ducts. WBCs improved with ATBXs. Plan:  
 
-Supportive care, IVF. 
-NPO. -US pending. 
-May need to consider ERCP, but awaiting US findings. 
-On Zosyn. 
-Discuss colonoscopy as outpatient for surveillance. Patient is seen and examined in collaboration with Dr. Robinson Florentino. Assessment and plan as per Dr. Al Asencio. Nat Carbone, PA-C [facility]

## 2018-09-18 NOTE — PROGRESS NOTES
Hospitalist Progress Note Admit Date:  2018  9:56 PM  
Name:  Kelvin Lerma Age:  75 y.o. 
:  1950 MRN:  [id number] PCP:  Melvina Vanegas MD 
Treatment Team: Attending Provider: Jolene Magaña DO; Consulting Provider: Kanwal Olguin MD; Consulting Provider: Delgado Guthrie MD; Consulting Provider: Clemente Yung MD; Care Manager: Juju Rodrigez RN; Utilization Review: Dave Schulte Subjective:  
 
 
 
Mr. Dana Dejesus is a 76 yo male with PMH of lap taylor 18 admitted with abdominal pain, elevated LFTS and concern for cholangitis. He has been seen by surgery and GI s/p ERCP with sphincterotomy and stone removal. he is on zosyn day 3. ABD US shows fatty liver. Hepatitis negative. Plans for home once stable 17 wife present, patient sleeping, ate postop Objective:  
Patient Vitals for the past 24 hrs: 
 Temp Pulse Resp BP SpO2  
18 1542 97.7 °F (36.5 °C) (!) 50 18 136/79 91 % 18 1527 - 60 18 149/77 95 % 18 1522 98.8 °F (37.1 °C) (!) 56 18 155/76 96 % 18 1517 - (!) 57 18 136/75 92 % 18 1512 - 61 18 138/76 94 % 18 1507 - 63 18 135/72 94 % 18 1503 - 62 18 127/66 95 % 18 1458 - 64 16 111/54 94 % 18 1454 99.7 °F (37.6 °C) 67 14 129/63 92 % 18 1310 - 63 17 136/61 94 % 18 1106 98.6 °F (37 °C) (!) 55 18 117/71 93 % 18 0648 98.7 °F (37.1 °C) 77 18 111/66 93 % 18 0400 98.4 °F (36.9 °C) 90 18 124/74 91 % 18 2330 98.9 °F (37.2 °C) 80 18 94/59 95 % 18 1930 98.2 °F (36.8 °C) 77 16 114/67 91 % Oxygen Therapy O2 Sat (%): 91 % (18 1542) Pulse via Oximetry: 60 beats per minute (18 1527) O2 Device: Room air (18 1522) O2 Flow Rate (L/min): 2 l/min (18 1454) Intake/Output Summary (Last 24 hours) at 18 1809 Last data filed at 18 1522 Gross per 24 hour Intake             3132 ml Output                0 ml Net             3132 ml General:    Well nourished. Obese, sleeping CV:   RRR. No murmur, rub, or gallop. No edema Lungs:   CTAB. No wheezing, rhonchi, or rales. Anterior exam  
Abdomen:   Soft, nondistended. obese Extremities: Warm and dry. Skin:     No rashes or jaundice. Data Review: 
I have reviewed all labs, meds, telemetry events, and studies from the last 24 hours: 
 
Recent Results (from the past 24 hour(s)) CBC W/O DIFF Collection Time: 09/18/18  4:21 AM  
Result Value Ref Range WBC 9.0 4.3 - 11.1 K/uL  
 RBC 5.02 4.23 - 5.6 M/uL  
 HGB 14.2 13.6 - 17.2 g/dL HCT 42.8 41.1 - 50.3 % MCV 85.3 79.6 - 97.8 FL  
 MCH 28.3 26.1 - 32.9 PG  
 MCHC 33.2 31.4 - 35.0 g/dL  
 RDW 14.8 % PLATELET 263 118 - 875 K/uL MPV 9.6 9.4 - 12.3 FL ABSOLUTE NRBC 0.00 0.0 - 0.2 K/uL METABOLIC PANEL, COMPREHENSIVE Collection Time: 09/18/18  4:21 AM  
Result Value Ref Range Sodium 138 136 - 145 mmol/L Potassium 3.4 (L) 3.5 - 5.1 mmol/L Chloride 101 98 - 107 mmol/L  
 CO2 28 21 - 32 mmol/L Anion gap 9 7 - 16 mmol/L Glucose 116 (H) 65 - 100 mg/dL BUN 19 8 - 23 MG/DL Creatinine 1.29 0.8 - 1.5 MG/DL  
 GFR est AA >60 >60 ml/min/1.73m2 GFR est non-AA 59 (L) >60 ml/min/1.73m2 Calcium 8.5 8.3 - 10.4 MG/DL Bilirubin, total 3.6 (H) 0.2 - 1.1 MG/DL  
 ALT (SGPT) 183 (H) 12 - 65 U/L  
 AST (SGOT) 41 (H) 15 - 37 U/L Alk. phosphatase 153 (H) 50 - 136 U/L Protein, total 6.4 6.3 - 8.2 g/dL Albumin 2.5 (L) 3.2 - 4.6 g/dL Globulin 3.9 (H) 2.3 - 3.5 g/dL A-G Ratio 0.6 (L) 1.2 - 3.5 All Micro Results None No results found for this visit on 09/16/18. Current Meds: 
Current Facility-Administered Medications Medication Dose Route Frequency  iopamidol (ISOVUE-370) 76 % injection 125 mL  125 mL IntraVENous RAD ONCE  
 famotidine (PF) (PEPCID) injection 20 mg  20 mg IntraVENous Q12H  sodium chloride (NS) flush 5-10 mL  5-10 mL IntraVENous Q8H  
 sodium chloride (NS) flush 5-10 mL  5-10 mL IntraVENous PRN  
 dextrose 5% - 0.45% NaCl with KCl 20 mEq/L infusion  100 mL/hr IntraVENous CONTINUOUS  
 acetaminophen (TYLENOL) tablet 650 mg  650 mg Oral Q4H PRN  
 oxyCODONE-acetaminophen (PERCOCET) 5-325 mg per tablet 1 Tab  1 Tab Oral Q4H PRN  
 HYDROmorphone (PF) (DILAUDID) injection 0.5 mg  0.5 mg IntraVENous Q4H PRN  
 naloxone (NARCAN) injection 0.4 mg  0.4 mg IntraVENous PRN  
 diphenhydrAMINE (BENADRYL) injection 12.5 mg  12.5 mg IntraVENous Q4H PRN  
 ondansetron (ZOFRAN) injection 4 mg  4 mg IntraVENous Q4H PRN  piperacillin-tazobactam (ZOSYN) 3.375 g in 0.9% sodium chloride (MBP/ADV) 100 mL  3.375 g IntraVENous Q8H  
 influenza vaccine 2018-19 (6 mos+)(PF) (FLUARIX QUAD/FLULAVAL QUAD) injection 0.5 mL  0.5 mL IntraMUSCular PRIOR TO DISCHARGE Other Studies (last 24 hours): Xr Ercp / Ercb Combined Result Date: 9/18/2018 ERCP images History: C-ARM FOR ERCP, 75 years Male  with history of cholangitis, choledocholithiasis Comparison: Abdominal ultrasound September 17, 2018 Findings:  ERCP was performed by gastroenterology service. Images are interpreted here. Please see dedicated report by GI service for complete details. ERCP images demonstrate normal-appearing intrahepatic and extra hepatic biliary ducts. Sphincterotomy and balloon sweep of the common bile duct was performed, with a solitary stone extracted. Impression: ERCP images as above. Fluoroscopy time: 2.44 min. Total number of fluoro images obtained: 4 Assessment and Plan:  
 
Hospital Problems as of 9/18/2018  Date Reviewed: 7/23/2018 Codes Class Noted - Resolved POA Abdominal pain ICD-10-CM: R10.9 ICD-9-CM: 789.00  9/17/2018 - Present Yes Elevated liver enzymes ICD-10-CM: R74.8 ICD-9-CM: 790.5  9/17/2018 - Present Yes LEONCIO (acute kidney injury) ([facility] [address] ICD-10-CM: N17.9 ICD-9-CM: 584.9  9/17/2018 - Present Yes Leukocytosis ICD-10-CM: D85.645 ICD-9-CM: 288.60  9/17/2018 - Present Yes * (Principal)Biliary obstruction ICD-10-CM: K83.1 ICD-9-CM: 576.2  9/17/2018 - Present Yes Cholangitis ICD-10-CM: K83.0 ICD-9-CM: 576.1  9/17/2018 - Present Yes Hyperbilirubinemia ICD-10-CM: E80.6 ICD-9-CM: 782.4  9/16/2018 - Present Yes Severe obesity (BMI 35.0-39.9) ([facility] [address] ICD-10-CM: E66.01 
ICD-9-CM: 278.01  6/12/2018 - Present Yes HLD (hyperlipidemia) ICD-10-CM: E78.5 ICD-9-CM: 272.4  12/19/2012 - Present Yes HTN (hypertension) ICD-10-CM: I10 
ICD-9-CM: 401.9  12/19/2012 - Present Yes CAD (coronary artery disease) ICD-10-CM: I25.10 ICD-9-CM: 414.00  12/19/2012 - Present Yes GERD (gastroesophageal reflux disease) ICD-10-CM: K21.9 ICD-9-CM: 530.81  12/19/2012 - Present Yes Plan: 
· Choledocholithiasis: s/p ERCP and stone extraction, clear diet per GI, continue zosyn, followup LFTS · Hypokalemia: replacing in IVF 
· HTN: will need medication restart at discharge · Obesity: recommend LITA workup outpatient DC planning/Dispo:  Pending to home Diet:  DIET NUTRITIONAL SUPPLEMENTS 
DIET CLEAR LIQUID 
DVT ppx:  SCD Signed: Venu Montgomery MD

## 2018-09-18 NOTE — PROCEDURES
ERCP: Endoscopic Retrograde Cholangiopancreatogram 
 
DATE of PROCEDURE: 9/18/2018 INDICATION: Cholangitis POSTPROCEDURE DIAGNOSIS: Choledocholithiasis MEDICATIONS ADMINISTERED:  GETA INSTRUMENT:  PROCEDURE:  After obtaining informed consent, the patient was placed in prone position on the fluoroscopy table. The patient was then sedated. The endoscope was passed under indirect technique to the oropharynx and gently passed into the esophagus. The endoscope was passed to the ampulla. Only partial views of the stomach and duodenum were obtained. After the procedure, the patient was taken to the recovery area in stable condition. FINDINGS: 
Limited views of the esophagus and stomach appeared normal. There was a large duodenal diverticulum in the area of the ampulla. The ampulla was very difficult to view (was in the floor of the diverticulum behind the tic fold). Using a Microvasive 39 sphincterotome preloaded with a 0.025\" wire, the PD was initially cannulated (very brief pancreatogram showed normal duct). Next, (at a very odd angle), the CBD was selectively cannulated where a cholangiogram showed normal appearing ducts without dilation. A large sphincterotomy was made using ERBE blended current for a which a solitary yellow stone was extracted. Multiple sweeps were made using a 9mm balloon producing only bile (no pus). Occlusion cholangiogram showed air bubbles but no retained stones. Estimated Blood Loss: 0 cc-min ASSESSMENT/PLAN: 
Choledocholithiasis Duodenal diverticulum 
 
- Continue Abx - Trend LFTs 
- Rectal Indocin to decrease risk of pancreatitis - If he does well post-op, likely d/c sometime soon Jessy Hughes MD

## 2018-09-18 NOTE — PROGRESS NOTES
TRANSFER - IN REPORT: 
 
Verbal report received from Marilyn(name) on Fletcher Neighbor  being received from [facility](unit) for routine progression of care Report consisted of patients Situation, Background, Assessment and  
Recommendations(SBAR). Information from the following report(s) Kardex was reviewed with the receiving nurse. Opportunity for questions and clarification was provided. Assessment completed upon patients arrival to unit and care assumed.

## 2018-09-18 NOTE — ANESTHESIA PREPROCEDURE EVALUATION
Anesthetic History Review of Systems / Medical History Patient summary reviewed Pulmonary Neuro/Psych Cardiovascular Hypertension CAD (\"minimal\" per patient, cath 12 years ago) Pertinent negatives: No past MI and angina Exercise tolerance: >4 METS 
  
GI/Hepatic/Renal 
  
GERD: well controlled Endo/Other Obesity Other Findings Physical Exam 
 
Airway Mallampati: II 
TM Distance: 4 - 6 cm Neck ROM: decreased range of motion, short neck Mouth opening: Normal 
 
 Cardiovascular Regular rate and rhythm,  S1 and S2 normal,  no murmur, click, rub, or gallop Dental 
 
Dentition: Poor dentition Pulmonary Breath sounds clear to auscultation Abdominal 
GI exam deferred Other Findings Anesthetic Plan ASA: 3 Anesthesia type: general 
 
 
 
 
Induction: Intravenous Anesthetic plan and risks discussed with: Patient

## 2018-09-19 LAB
ALBUMIN SERPL-MCNC: 2.4 G/DL (ref 3.2–4.6)
ALBUMIN/GLOB SERPL: 0.6 {RATIO} (ref 1.2–3.5)
ALP SERPL-CCNC: 136 U/L (ref 50–136)
ALT SERPL-CCNC: 141 U/L (ref 12–65)
ANION GAP SERPL CALC-SCNC: 6 MMOL/L (ref 7–16)
AST SERPL-CCNC: 41 U/L (ref 15–37)
BILIRUB SERPL-MCNC: 2.5 MG/DL (ref 0.2–1.1)
BUN SERPL-MCNC: 14 MG/DL (ref 8–23)
CALCIUM SERPL-MCNC: 8.4 MG/DL (ref 8.3–10.4)
CHLORIDE SERPL-SCNC: 103 MMOL/L (ref 98–107)
CO2 SERPL-SCNC: 30 MMOL/L (ref 21–32)
CREAT SERPL-MCNC: 1.14 MG/DL (ref 0.8–1.5)
ERYTHROCYTE [DISTWIDTH] IN BLOOD BY AUTOMATED COUNT: 14.7 %
GLOBULIN SER CALC-MCNC: 4 G/DL (ref 2.3–3.5)
GLUCOSE SERPL-MCNC: 106 MG/DL (ref 65–100)
HCT VFR BLD AUTO: 40.9 % (ref 41.1–50.3)
HGB BLD-MCNC: 13.5 G/DL (ref 13.6–17.2)
MCH RBC QN AUTO: 28.5 PG (ref 26.1–32.9)
MCHC RBC AUTO-ENTMCNC: 33 G/DL (ref 31.4–35)
MCV RBC AUTO: 86.5 FL (ref 79.6–97.8)
NRBC # BLD: 0 K/UL (ref 0–0.2)
PLATELET # BLD AUTO: 187 K/UL (ref 150–450)
PMV BLD AUTO: 9.6 FL (ref 9.4–12.3)
POTASSIUM SERPL-SCNC: 3.6 MMOL/L (ref 3.5–5.1)
PROT SERPL-MCNC: 6.4 G/DL (ref 6.3–8.2)
RBC # BLD AUTO: 4.73 M/UL (ref 4.23–5.6)
SODIUM SERPL-SCNC: 139 MMOL/L (ref 136–145)
WBC # BLD AUTO: 7.8 K/UL (ref 4.3–11.1)

## 2018-09-19 PROCEDURE — 80053 COMPREHEN METABOLIC PANEL: CPT

## 2018-09-19 PROCEDURE — 74011250636 HC RX REV CODE- 250/636: Performed by: SURGERY

## 2018-09-19 PROCEDURE — 85027 COMPLETE CBC AUTOMATED: CPT

## 2018-09-19 PROCEDURE — 74011250637 HC RX REV CODE- 250/637: Performed by: INTERNAL MEDICINE

## 2018-09-19 PROCEDURE — 65270000029 HC RM PRIVATE

## 2018-09-19 PROCEDURE — 36415 COLL VENOUS BLD VENIPUNCTURE: CPT

## 2018-09-19 PROCEDURE — 74011000258 HC RX REV CODE- 258: Performed by: SURGERY

## 2018-09-19 RX ORDER — DEXTROMETHORPHAN/PSEUDOEPHED 2.5-7.5/.8
40 DROPS ORAL
Status: DISCONTINUED | OUTPATIENT
Start: 2018-09-19 | End: 2018-09-20 | Stop reason: HOSPADM

## 2018-09-19 RX ORDER — CIPROFLOXACIN 500 MG/1
500 TABLET ORAL EVERY 12 HOURS
Status: DISCONTINUED | OUTPATIENT
Start: 2018-09-19 | End: 2018-09-20 | Stop reason: HOSPADM

## 2018-09-19 RX ORDER — FAMOTIDINE 20 MG/1
20 TABLET, FILM COATED ORAL 2 TIMES DAILY
Status: DISCONTINUED | OUTPATIENT
Start: 2018-09-19 | End: 2018-09-20 | Stop reason: HOSPADM

## 2018-09-19 RX ADMIN — FAMOTIDINE 20 MG: 20 TABLET ORAL at 17:06

## 2018-09-19 RX ADMIN — Medication 5 ML: at 20:02

## 2018-09-19 RX ADMIN — Medication 10 ML: at 06:15

## 2018-09-19 RX ADMIN — PIPERACILLIN SODIUM,TAZOBACTAM SODIUM 3.38 G: 3; .375 INJECTION, POWDER, FOR SOLUTION INTRAVENOUS at 06:22

## 2018-09-19 RX ADMIN — Medication 10 ML: at 20:42

## 2018-09-19 RX ADMIN — CIPROFLOXACIN 500 MG: 500 TABLET, FILM COATED ORAL at 15:21

## 2018-09-19 RX ADMIN — CIPROFLOXACIN 500 MG: 500 TABLET, FILM COATED ORAL at 20:41

## 2018-09-19 RX ADMIN — FAMOTIDINE 20 MG: 10 INJECTION, SOLUTION INTRAVENOUS at 08:32

## 2018-09-19 RX ADMIN — Medication 10 ML: at 13:24

## 2018-09-19 NOTE — PROGRESS NOTES
Spoke with Dr. Lenna Canavan about patient's c/o dryness and soreness in mouth. MD to place order for MMW.

## 2018-09-19 NOTE — PROGRESS NOTES
H&P/Consult Note/Progress Note/Office Note:  
Shabana Rendon  MRN: [id number]  :1950  Age:72 y.o. 
 
HPI: Shabana Rendon is a 75 y.o. male is s/p lap cholecystectomy on 18. He was transferred to [facility] from [facility] after presenting with fever, epigastric pain, and Tbili of 6. We were contacted being he was s/p cholecystectomy for recommendations and subsequently had him sent here. His CT at [facility] did not have acute findings and is shown below. He is currently in NAD but continues to report epigastric pain that radiates to his shoulder. He denies nausea or vomiting. He further denies difficulty moving his bowels. Abdominal pain has no direct relationship with PO intake. On admission he had Tbili is 5.4, , AST 69, ALP 69, AF, VSS. Previous history: He missed his final follow-up visit with Dr. Marci Brock on 2018. Came in the week prior and had his skin clips removed with our nurse. He got the impression he did not need to come back. Dr Marci Brock called him at home on 18 and pt was doing well with no issues. Prior to surgery he reported a 3 year h/o of intermittent episodes of RUQ pain which was worse just before surgery. He was on omeprazole for remote history of gastritis but this did not alleviate his symptoms or prevent them from happening. Additional antacids were also not helping. He denied any associated nausea vomiting fevers or chills. He was not on blood thinners. He had a prior umbilical hernia repair at [facility] in approximately  (He did not know if mesh was used). He reported a history of peptic ulcer disease from age 14. He has seen gastroenterology but does not remember the name of his gastroenterologist.  
He reported a prior unremarkable colonoscopy but reported this is more than 5 years ago. He reported a prior CT from approximately  at [facility] which identified gallstones. 12/27/16 U/S abd Hx:  Abd distention, hx gallstones 
  The liver is slightly echogenic. There are no discrete lesions in the visualized portions of the liver, pancreas, or spleen. There is no bile duct 
dilatation. The common bile duct measures 6 mm. There are multiple small stones in the gallbladder. .  There is no significant wall thickening. The gallbladder is not distended. 
  
The right kidney measures 11.7 cm in length. The left kidney measures 12.9 cm. There is no hydronephrosis. There is no evidence of a renal mass. There is no ascites. The aorta and inferior vena cava are normal in caliber. 
  
IMPRESSION: Cholelithiasis 6/22/18 RUQ U/S (pre-op) Hx: RUQ pain, h/o gallstones. 
  
FINDINGS: Pancreas is grossly unremarkable although the tail is partially obscured by overlying bowel gas. Aorta is normal caliber, 2.0 cm. The IVC is patent. The normal sized spleen has a homogenous echotexture and measures 12.3 cm. Left kidney contains a 15 mm simple appearing cyst, otherwise unremarkable without hydronephrosis and measures 12.1 cm. Right kidney is unremarkable without hydronephrosis and measures 12 cm. Renal echogenicity is normal, the right kidney is hypoechoic to the liver. The intrahepatic biliary tree is not dilated. There is hepatopetal flow in the portal vein. No gross focal parenchymal abnormality identified within the liver. The gallbladder wall is thickened, 3 - 6 mm. Multiple small echogenic shadowing mobile gallstones. Negative Izaguirre's sign. The common bile duct is not dilated 6 mm.  
  
IMPRESSION: Cholelithiasis and gallbladder wall thickening. No acute cholecystitis or biliary tree obstruction 9/16/18 CT abd/pelvis --[facility] Findings: There are mild bibasilar lung opacities consistent with atelectasis.  No pneumothorax or pleural effusion are seen. Coronary artery calcifications are present.  
The liver, spleen, and bilateral adrenal glands are within normal limits for size. There is atrophy of the pancreas. Cholecystectomy clips are present in the right upper quadrant.  No significant fluid collection is seen within the right upper quadrant. Review of both kidneys demonstrates no evidence of hydronephrosis or hydroureter.   
There is a benign-appearing cyst arising from the upper pole of the left kidney. The bladder suboptimally distended limiting evaluation. Prostate calcifications are present. Review of the bowel demonstrates a nonobstructive bowel gas pattern.  There are multiple uncomplicated diverticuli within the distal large bowel.  The appendix is not visualized. There is no evidence of free air within the abdomen or pelvis. There are vascular calcifications of the aorta and associated vessels.  No abdominal aortic aneurysm is seen. Review of the osseous structures demonstrates degenerative changes of the thoracolumbar spine and pelvis. Impression: 1.  No evidence of acute process. 2.  Other findings as discussed above. 9/17/18 Abd U/S Hx: Prior laparoscopic cholecystectomy with elevated liver function tests. COMPARISON: 22 June 2018. 
  
FINDINGS: Pancreas obscured by overlying bowel gas. Aorta is normal caliber, 1.8 
cm. The IVC is patent. The normal sized spleen has a homogenous echotexture and 
measures 12 cm. Left kidney is unremarkable without hydronephrosis and measures 12.8 cm. Right kidney is unremarkable without hydronephrosis and measures 12.1 
cm. Renal echogenicity is normal, the right kidney is hypoechoic to the liver. The intrahepatic biliary tree is not dilated. There is hepatopetal flow in the 
portal vein. No gross focal parenchymal abnormality identified within the liver. Increased liver echogenicity suggesting fatty infiltration. The gallbladder is 
absent. The common bile duct is not dilated 6 mm.  
  
IMPRESSION: Negative for biliary tree obstruction. Increased liver echogenicity suggesting fatty infiltration 9/18/18 ERCP Findings:  ERCP was performed by gastroenterology service. Images are interpreted here. Please see dedicated report by GI service for complete details. ERCP images demonstrate normal-appearing intrahepatic and extra hepatic biliary ducts. Sphincterotomy and balloon sweep of the common bile duct was performed, with a solitary stone extracted. 9/17/18 Acute Hepatitis panel negative 9/18/18 more comfortable; denies pain; U/S yesterday showed no biliary ductal dilation; LFTs still abnormal but better; ERCP -CBD cleared but no stones seen; GI following 9/19/18 resting comfortably; no fevers Past Medical History:  
Diagnosis Date  CAD (coronary artery disease) 12/19/2012  ED (erectile dysfunction) 12/19/2012  GERD (gastroesophageal reflux disease) 12/19/2012  
 managed by meds  HLD (hyperlipidemia) 12/19/2012  
 HTN (hypertension) 12/19/2012  
 managed by [employer]  Psychiatric disorder   
 managed by meds Past Surgical History:  
Procedure Laterality Date  HX COLONOSCOPY    
 and EGD  HX HEART CATHETERIZATION    
 HX HERNIA REPAIR    
 umbilical  
 
Current Facility-Administered Medications Medication Dose Route Frequency  famotidine (PF) (PEPCID) injection 20 mg  20 mg IntraVENous Q12H  
 sodium chloride (NS) flush 5-10 mL  5-10 mL IntraVENous Q8H  
 sodium chloride (NS) flush 5-10 mL  5-10 mL IntraVENous PRN  
 dextrose 5% - 0.45% NaCl with KCl 20 mEq/L infusion  100 mL/hr IntraVENous CONTINUOUS  
 acetaminophen (TYLENOL) tablet 650 mg  650 mg Oral Q4H PRN  
 oxyCODONE-acetaminophen (PERCOCET) 5-325 mg per tablet 1 Tab  1 Tab Oral Q4H PRN  
 HYDROmorphone (PF) (DILAUDID) injection 0.5 mg  0.5 mg IntraVENous Q4H PRN  
 naloxone (NARCAN) injection 0.4 mg  0.4 mg IntraVENous PRN  
 diphenhydrAMINE (BENADRYL) injection 12.5 mg  12.5 mg IntraVENous Q4H PRN  
 ondansetron (ZOFRAN) injection 4 mg  4 mg IntraVENous Q4H PRN  
  piperacillin-tazobactam (ZOSYN) 3.375 g in 0.9% sodium chloride (MBP/ADV) 100 mL  3.375 g IntraVENous Q8H  
 influenza vaccine 2018-19 (6 mos+)(PF) (FLUARIX QUAD/FLULAVAL QUAD) injection 0.5 mL  0.5 mL IntraMUSCular PRIOR TO DISCHARGE Review of patient's allergies indicates no known allergies. Social History Social History  Marital status:  Spouse name: N/A  
 Number of children: N/A  
 Years of education: N/A Social History Main Topics  Smoking status: Never Smoker  Smokeless tobacco: Never Used  Alcohol use No  
 Drug use: No  
 Sexual activity: Not Asked Other Topics Concern  None Social History Narrative History Smoking Status  Never Smoker Smokeless Tobacco  
 Never Used Family History Problem Relation Age of Onset  Hypertension Mother  Cancer Father Skin  Diabetes Other Cousin ROS: The patient has no difficulty with chest pain or shortness of breath. No fever or chills. Comprehensive review of systems was otherwise unremarkable except as noted above. Physical Exam: (from most recent visit) Visit Vitals  /63  Pulse 61  Temp 98.6 °F (37 °C)  Resp 17  Ht 6' (1.829 m)  Wt 262 lb (118.8 kg)  SpO2 94%  BMI 35.53 kg/m2 Constitutional: Alert, oriented, cooperative patient in no acute distress; appears stated age Eyes:Sclera are clear. EOMs intact ENMT: no external lesions gross hearing normal; no obvious neck masses, no ear or lip lesions, nares normal 
CV: RRR. Normal perfusion Resp: No JVD. Breathing is  non-labored; no audible wheezing. GI: soft and non-distended; no rebound, no guarding Musculoskeletal: unremarkable with normal function. No embolic signs or cyanosis. Neuro:  Oriented; moves all 4; no focal deficits Psychiatric: normal affect and mood, no memory impairment Recent vitals (if inpt): 
Patient Vitals for the past 24 hrs: 
 BP Temp Pulse Resp SpO2 09/19/18 0338 102/63 98.6 °F (37 °C) 61 17 94 % 09/18/18 2325 100/64 98.4 °F (36.9 °C) (!) 54 17 92 % 09/18/18 1914 119/70 98 °F (36.7 °C) (!) 52 18 93 % 09/18/18 1542 136/79 97.7 °F (36.5 °C) (!) 50 18 91 % 09/18/18 1527 149/77 - 60 18 95 % 09/18/18 1522 155/76 98.8 °F (37.1 °C) (!) 56 18 96 % 09/18/18 1517 136/75 - (!) 57 18 92 % 09/18/18 1512 138/76 - 61 18 94 % 09/18/18 1507 135/72 - 63 18 94 % 09/18/18 1503 127/66 - 62 18 95 % 09/18/18 1458 111/54 - 64 16 94 % 09/18/18 1454 129/63 99.7 °F (37.6 °C) 67 14 92 % 09/18/18 1310 136/61 - 63 17 94 % 09/18/18 1106 117/71 98.6 °F (37 °C) (!) 55 18 93 % 09/18/18 0648 111/66 98.7 °F (37.1 °C) 77 18 93 % Labs: 
Recent Labs  
   09/19/18 
 0419   09/17/18 
 1232  09/17/18 
 7876 WBC  7.8   < >   --    --   
HGB  13.5*   < >   --    --   
PLT  187   < >   --    --   
NA  139   < >   --    --   
K  3.6   < >   --    --   
CL  103   < >   --    --   
CO2  PENDING   < >   --    --   
BUN  PENDING   < >   --    --   
CREA  PENDING   < >   --    --   
GLU  PENDING   < >   --    --   
PTP   --    --   14.3   --   
INR   --    --   1.2   --   
TBILI  PENDING   < >   --    --   
CBIL   --    --    --   3.5* SGOT  PENDING   < >   --    --   
ALT  PENDING   < >   --    --   
AP  PENDING   < >   --    --   
 < > = values in this interval not displayed. Lab Results Component Value Date/Time  WBC 7.8 09/19/2018 04:19 AM  
 HGB 13.5 (L) 09/19/2018 04:19 AM  
 PLATELET 406 15/28/7397 04:19 AM  
 Sodium 139 09/19/2018 04:19 AM  
 Potassium 3.6 09/19/2018 04:19 AM  
 Chloride 103 09/19/2018 04:19 AM  
 CO2 PENDING 09/19/2018 04:19 AM  
 BUN PENDING 09/19/2018 04:19 AM  
 Creatinine PENDING 09/19/2018 04:19 AM  
 Glucose PENDING 09/19/2018 04:19 AM  
 INR 1.2 09/17/2018 12:32 PM  
 Bilirubin, total PENDING 09/19/2018 04:19 AM  
 Bilirubin, direct 3.5 (H) 09/17/2018 09:39 AM  
 AST (SGOT) PENDING 09/19/2018 04:19 AM  
 ALT (SGPT) PENDING 09/19/2018 04:19 AM  
 Alk. phosphatase PENDING 09/19/2018 04:19 AM  
 
 
I reviewed recent labs and recent radiologic studies. I independently reviewed radiology images for studies I described above or studies I have ordered. Admission date (for inpatients): 9/16/2018 [unfilled]  [unfilled] ASSESSMENT/PLAN: 
Problem List  Date Reviewed: 7/23/2018 Codes Class Noted Abdominal pain ICD-10-CM: R10.9 ICD-9-CM: 789.00  9/17/2018 Elevated liver enzymes ICD-10-CM: R74.8 ICD-9-CM: 790.5  9/17/2018 LEONCIO (acute kidney injury) ([facility] ICD-10-CM: N17.9 ICD-9-CM: 584.9  9/17/2018 Leukocytosis ICD-10-CM: B08.512 ICD-9-CM: 288.60  9/17/2018 * (Principal)Biliary obstruction ICD-10-CM: K83.1 ICD-9-CM: 576.2  9/17/2018 Cholangitis ICD-10-CM: K83.0 ICD-9-CM: 576.1  9/17/2018 Hyperbilirubinemia ICD-10-CM: E80.6 ICD-9-CM: 782.4  9/16/2018 Chronic calculous cholecystitis ICD-10-CM: K80.10 ICD-9-CM: 574.10  6/27/2018 Overview Addendum 7/15/2018  9:15 AM by Todd Lino MD  
  7/6/18 s/p lap taylor; Dr Ivet Che DIAGNOSIS GALLBLADDER: CHRONIC CHOLECYSTITIS; CHOLELITHIASIS. Electronically signed out on 7/8/2018 17:43 by Jose Conroy. Noé Nuno MD  
Microscopic Description : Chronic inflammation is seen in the wall of the gallbladder. No dysplasia is identified. Dr. Jose Carlos Bledsoe concurs Severe obesity (BMI 35.0-39.9) ([facility] ICD-10-CM: E66.01 
ICD-9-CM: 278.01  6/12/2018 HLD (hyperlipidemia) ICD-10-CM: E78.5 ICD-9-CM: 272.4  12/19/2012 HTN (hypertension) ICD-10-CM: I10 
ICD-9-CM: 401.9  12/19/2012 CAD (coronary artery disease) ICD-10-CM: I25.10 ICD-9-CM: 414.00  12/19/2012 ED (erectile dysfunction) ICD-10-CM: N52.9 ICD-9-CM: 607.84  12/19/2012 GERD (gastroesophageal reflux disease) ICD-10-CM: K21.9 ICD-9-CM: 530.81  12/19/2012 Principal Problem: 
  Biliary obstruction (9/17/2018) Active Problems: 
  HLD (hyperlipidemia) (12/19/2012) HTN (hypertension) (12/19/2012) CAD (coronary artery disease) (12/19/2012) GERD (gastroesophageal reflux disease) (12/19/2012) Severe obesity (BMI 35.0-39.9) ([facility] (6/12/2018) Hyperbilirubinemia (9/16/2018) Abdominal pain (9/17/2018) Elevated liver enzymes (9/17/2018) [facility]ONCIO [facility]acute kidney [facility] (9/17/2018) Leukocytosis (9/17/2018) Cholangitis (9/17/2018) He was doing well s/p lap cholecystectomy on 7/6/18 up to this admission Presented with acute RUQ/epigastric pain and elevated T bIli and LFTs, but no ductal dilation and no liver or pancreatic masses on CT and U/S. He is s/p ERCP (Dr Socorro Clark ) on 9/18/18 with CBD clearance but no stones or pus noted. Hepatitis panel negative 9/17/18 He does have signs of hepatic steatosis on U/S Hospitalists transferred him to their service with GI following. No surgical pathology or needs at this time Will sign off. Call if needed Signed:  Kayli Jason MD

## 2018-09-19 NOTE — PROGRESS NOTES
GI DAILY PROGRESS NOTE Admit Date:  9/16/2018 Today's Date:  9/19/2018 CC:  Cholangitis Subjective:  
 
Patient is feeling better today. He had some bloating with clear liquids, but no significant abd pain. He denies any nausea or vomiting. He had a small BM today, with no bleeding. Medications:  
Current Facility-Administered Medications Medication Dose Route Frequency  famotidine (PF) (PEPCID) injection 20 mg  20 mg IntraVENous Q12H  
 sodium chloride (NS) flush 5-10 mL  5-10 mL IntraVENous Q8H  
 sodium chloride (NS) flush 5-10 mL  5-10 mL IntraVENous PRN  
 dextrose 5% - 0.45% NaCl with KCl 20 mEq/L infusion  100 mL/hr IntraVENous CONTINUOUS  
 acetaminophen (TYLENOL) tablet 650 mg  650 mg Oral Q4H PRN  
 oxyCODONE-acetaminophen (PERCOCET) 5-325 mg per tablet 1 Tab  1 Tab Oral Q4H PRN  
 HYDROmorphone (PF) (DILAUDID) injection 0.5 mg  0.5 mg IntraVENous Q4H PRN  
 naloxone (NARCAN) injection 0.4 mg  0.4 mg IntraVENous PRN  
 diphenhydrAMINE (BENADRYL) injection 12.5 mg  12.5 mg IntraVENous Q4H PRN  
 ondansetron (ZOFRAN) injection 4 mg  4 mg IntraVENous Q4H PRN  piperacillin-tazobactam (ZOSYN) 3.375 g in 0.9% sodium chloride (MBP/ADV) 100 mL  3.375 g IntraVENous Q8H  
 influenza vaccine 2018-19 (6 mos+)(PF) (FLUARIX QUAD/FLULAVAL QUAD) injection 0.5 mL  0.5 mL IntraMUSCular PRIOR TO DISCHARGE Review of Systems: ROS was obtained, with pertinent positives as listed above. No chest pain or SOB. Diet:  Clear liquids Objective:  
Vitals: 
Visit Vitals  /80  Pulse (!) 53  Temp 98.2 °F (36.8 °C)  Resp 17  Ht 6' (1.829 m)  Wt 118.8 kg (262 lb)  SpO2 93%  BMI 35.53 kg/m2 Intake/Output: 
  
09/17 1901 - 09/19 0700 In: 0442 [P.O.:100; I.V.:3232] Out: 0 Exam: 
General appearance: alert, cooperative, no distress, lying in bed 
Lungs: clear to auscultation bilaterally anteriorly Heart:  bradycardic Abdomen: soft, mildly tender over upper abd. Bowel sounds normal. No masses, no organomegaly Neuro:  alert and oriented Data Review (Labs):   
Recent Labs  
   09/19/18 
 7056  09/18/18 
 0421  09/17/18 
 1232  09/17/18 
 1521  09/17/18 
 0527 WBC  7.8  9.0   --    --   9.9 HGB  13.5*  14.2   --    --   14.6 HCT  40.9*  42.8   --    --   42.9 PLT  187  186   --    --   185 MCV  86.5  85.3   --    --   84.1 NA  139  138   --    --   140  
K  3.6  3.4*   --    --   3.3*  
CL  103  101   --    --   102 CO2  30  28   --    --   27 BUN  14  19   --    --   21  
CREA  1.14  1.29   --    --   1.21  
CA  8.4  8.5   --    --   8.7 GLU  106*  116*   --    --   134* AP  136  153*   --    --   171* SGOT  41*  41*   --    --   69* ALT  141*  183*   --    --   294* TBILI  2.5*  3.6*   --    --   5.4* CBIL   --    --    --   3.5*   --   
ALB  2.4*  2.5*   --    --   2.8*  
TP  6.4  6.4   --    --   6.6 PTP   --    --   14.3   --    --   
INR   --    --   1.2   --    --   
 
Labs from [facility] 16 Sept 2018: 
Blood Gas Blood Gas Component Value Ref Range Performed At [facility] 36. LAB Specimen Type, Blood Gas Venous    [place] [address] LAB Patient Temperature 98.6 98.5 - [facility] LAB 69 [facility] LAB Lactate 1.5 0.5 - 2.2 mmol/L [place] [address] LAB  
  
Complete Blood Count Complete Blood Count Component Value Ref Range Performed At WBC 13.9 (H) 4.0 - 11.5 TH/mm3 [place] [address] LAB  
RBC 5.88 4.50 - 5.90 [address] [place] LAB Hemoglobin 16.7 13.5 - 17.5 g/dL [address] LAB Hematocrit 50.0 41.0 - 53.0 % [facility]  
MCV 85.0 80.0 - 100.0 fL [facility]  
MCH 28.4 26.0 - 34.0 pg [facility]  
MCHC 33.3 31.0 - 37.0 g/dL [facility]  
RDW 15.3 12.0 - 16.0 % [facility] Platelets Õli 68 Th/mm3 [facility]  [facility] MPV 7.6 6.9 - 10.6 fL [facility] Neutrophils, % 86.5 % [facility]  [facility] Lymphocytes, % 6.6 % [address] LAB Monocytes % 6.1 % [address] [facility] Eosinophils % 0.2 % [address] LAB Basophils % 0.6 % [address] LAB Neutrophils, Abs 12.0 (H) 1.4 - 6.6 TH/mm3 [address] LAB Lymphocytes, Abs 0.9 (L) 1.0 - 3.5 TH/mm3 [address] LAB Monocytes Abs 0.9 <1.0 TH/mm3 [address] LAB Eosinophils, Abs 0.0 <0.7 TH/mm3 [address] LAB Basophils, Abs 0.1 (H) <0.1 TH/mm3 [address] LAB  
  
Lipase Lipase Component Value Ref Range Performed At Lipase 15 8 - 78 U/L [address] LAB  
  
Comprehen Metabolic Panel (CMP) Comprehen Metabolic Panel (CMP) Component Value Ref Range Performed At Sodium 136 133 - 144 mmol/L [address] LAB Potassium 3.4 3.4 - 5.1 mmol/L [address] LAB Chloride 98 (L) 101 - 111 mmol/L [address] LAB  
CO2 24 20 - 30 mmol/L [address] LAB Anion Gap 14 6 - 16 mmol/L [address] LAB  
BUN 22 8 - 26 mg/dL [address] LAB Creatinine 1.51 (H)Comment: Specimen icteric, results will be falsely decreased. 0.72 - 1.25 mg/dL [address] LAB Glucose 130 (H) 82 - 99 mg/dL [address] LAB Calcium 10.4 8.5 - 10.4 mg/dL [address] LAB  
 (H) 5 - 51 U/L [address] LAB  (H) 1 - 55 U/L [address] LAB Alkaline Phosphatase 194 (H) 32 - 125 U/L [address] LAB Protein, Total 7.5 6.0 - 8.3 g/dL [address] LAB Albumin 3.5 3.4 - 4.8 g/dL [facility] LAB Bilirubin, Total 6.2 (H) 0.1 - 1.2 mg/dL [facility]  
eGFR Non-African American 47 (L) >59 mL/min [facility]  
eGFR  54 (L) >59 mL/min [facility] LAB Urine Microscopic Urine Microscopic Component Value Ref Range Performed At WBC, UA 5-10 (A) 0 , <1, 1-3, None Seen /HPF [place] [address] LAB  
RBC, UA 3-5 (A) 0 , 1 , 1-2, None Seen, <1 [address] [place] [address] LAB Bacteria, UA 1+ (A) None Seen [facility] [place] [address] LAB Coarse Granular Casts, UA 1-5 (A) None Seen /LPF [facility] LAB Urinalysis w/Reflex Microscopic Urinalysis w/Reflex Microscopic Component Value Ref Range Performed At [facility], UA Dark [place] LAB Clarity, [address] LAB Specific [place], UA 1.010 1.003 - 1.040 [address] LAB  
pH, UA 6.0 4.6 - 8.0 [address] LAB Leukocyte Esterase, UA Negative Negative [address] LAB Nitrite, UA Negative Negative [address] LAB Protein, UA Trace (A) Negative mg/dL [address] LAB Glucose, UA Negative Negative mg/dl [address] LAB Ketones, UA Negative Negative mg/dL [address] LAB Urobilinogen, UA 1.0 <2.0 E.U./dL [address] LAB Blood, UA Small (A) Negative [address] LAB Bilirubin, UA Moderate (A) Negative mg/dL [address] [facility]  
  
CT ABDOMEN PELVIS W CONTRAST ACCESSION NO: [id number] ORDERED BY: RUPALI IZQUIERDO DO 
DATE OF EXAM: 09/16/2018 18:10 
REASON FOR EXAM: Jake April only, abd pain, fever, hx recent GB removal 
ADMISSION DATE: 09/16/2018 17:27 CT of the abdomen and pelvis was performed following intravenous administration of 100 ml of Omnipaque 350 intravenous contrast. 
Clinical Indication: Abdominal pain. Fever. History of recent cholecystectomy. Comparison: None Findings: There are mild bibasilar lung opacities consistent with atelectasis. No pneumothorax or pleural effusion are seen. Coronary artery calcifications are present. The liver, spleen, and bilateral adrenal glands are within normal limits for size. There is atrophy of the pancreas. Cholecystectomy clips are present in the right upper quadrant. No significant fluid collection is seen within the right upper quadrant. Review of both kidneys demonstrates no evidence of hydronephrosis or hydroureter. There is a benign-appearing cyst arising from the upper pole of the left kidney. The bladder suboptimally distended limiting evaluation. Prostate calcifications are present. Review of the bowel demonstrates a nonobstructive bowel gas pattern.  There are multiple uncomplicated diverticuli within the distal large bowel. The appendix is not visualized. There is no evidence of free air within the abdomen or pelvis. There are vascular calcifications of the aorta and associated vessels. No abdominal aortic aneurysm is seen. Review of the osseous structures demonstrates degenerative changes of the thoracolumbar spine and pelvis. Impression: 1. No evidence of acute process. 2. Other findings as discussed above. : genevieve TRANSCRIBE TIME/DATE: 09/16/2018 06:39 pm 
READ BY: Celia Lawson MD 
THIS IS AN ELECTRONICALLY VERIFIED REPORT  
9/16/2018 6:39 PM: Celia Lawson MD  
 
ABDOMINAL ULTRASOUND. 17 Sept 2018  
HISTORY: Prior laparoscopic cholecystectomy with elevated liver function tests.  
COMPARISON: 22 June 2018.  FINDINGS: Pancreas obscured by overlying bowel gas. Aorta is normal caliber, 1.8 
cm. The IVC is patent. The normal sized spleen has a homogenous echotexture and 
measures 12 cm. Left kidney is unremarkable without hydronephrosis and measures 12.8 cm. Right kidney is unremarkable without hydronephrosis and measures 12.1 
cm. Renal echogenicity is normal, the right kidney is hypoechoic to the liver. The intrahepatic biliary tree is not dilated. There is hepatopetal flow in the 
portal vein. No gross focal parenchymal abnormality identified within the liver. Increased liver echogenicity suggesting fatty infiltration. The gallbladder is 
absent. The common bile duct is not dilated 6 mm.   
IMPRESSION IMPRESSION: Negative for biliary tree obstruction. Increased liver echogenicity 
suggesting fatty infiltration.  
 
9/18/2018  7:39 AM - [place]  
Component Results   
  Component Value Flag Ref Range Units Status  
  Hepatitis A Ab, IgM NEGATIVE   NEGATIVE   Final  
  Hep B surface Ag screen NEGATIVE   NEGATIVE   Final  
  Hep B Core Ab, IgM NEGATIVE   NEGATIVE   Final  
   Hep C Virus Ab <0.1  0.0 - 0.9 s/co ratio Final  
 
ERCP 18 Sept 2018 with Dr. Steven Greenwood with FINDINGS: Limited views of the esophagus and stomach appeared normal. There was a large duodenal diverticulum in the area of the ampulla. The ampulla was very difficult to view (was in the floor of the diverticulum behind the tic fold). Using a Microvasive 39 sphincterotome preloaded with a 0.025\" wire, the PD was initially cannulated (very brief pancreatogram showed normal duct). Next, (at a very odd angle), the CBD was selectively cannulated where a cholangiogram showed normal appearing ducts without dilation. A large sphincterotomy was made using ERBE blended current for a which a solitary yellow stone was extracted. Multiple sweeps were made using a 9mm balloon producing only bile (no pus). Occlusion cholangiogram showed air bubbles but no retained stones. ASSESSMENT/PLAN: Choledocholithiasis, Duodenal diverticulum. Plan: Continue Abx. Trend LFTs. Rectal Indocin to decrease risk of pancreatitis. If he does well post-op, likely d/c sometime soon. Assessment:  
 
Principal Problem: 
  Biliary obstruction (9/17/2018) Active Problems: 
  HLD (hyperlipidemia) (12/19/2012) HTN (hypertension) (12/19/2012) CAD (coronary artery disease) (12/19/2012) GERD (gastroesophageal reflux disease) (12/19/2012) Severe obesity (BMI 35.0-39.9) ([facility] [address] (6/12/2018) Hyperbilirubinemia (9/16/2018) Abdominal pain (9/17/2018) Elevated liver enzymes (9/17/2018) LEONCIO (acute kidney injury) ([address] (9/17/2018) Leukocytosis (9/17/2018) Cholangitis (9/17/2018) 76 yo male with PMH of CAD, GERD, PUD, HTN, HLD, hx of gallstones s/p cholecystectomy, who was seen in consultation 17 Sept 2018 at the request of Paul Blake NP for cholangitis, after transferred to [facility] after presenting with increased upper abd pain, nausea, fevers, and noted to have leukocytosis and elevated LFTs on labs with negative CT scan. Concern for cholangitis given findings and history of recent cholecystectomy with gallstones. US was negative for biliary tree obstruction, and noted increased liver echogenicity suggesting fatty infiltration. ERCP 18 Sept 2018 noted a solitary stone, removed after large sphincterotomy. Labs improving. Plan:  
 
-Supportive care, IVF. -Advance to GI soft diet. 
-On Zosyn. 
-Discuss colonoscopy as outpatient for surveillance. Patient is seen and examined in collaboration with Dr. Dario Moore. Assessment and plan as per Dr. Avis Mancuso. Nta Navarrete PA-C [facility]

## 2018-09-19 NOTE — PROGRESS NOTES
Hospitalist Progress Note Admit Date:  2018  9:56 PM  
Name:  Chester Haile Age:  75 y.o. 
:  1950 MRN:  [id number] PCP:  Chapincito Monroy MD 
Treatment Team: Attending Provider: Celestino Pires DO; Consulting Provider: Jose F Vu MD; Consulting Provider: Pavel Wolff MD; Care Manager: Sofie Arteaga RN; Utilization Review: Jeni Prado Subjective:  
 
 
 
Mr. Ora Shah is a 74 yo male with PMH of lap taylor 18 admitted with abdominal pain, elevated LFTS and concern for cholangitis. He has been seen by surgery and GI s/p ERCP with sphincterotomy and stone removal. he is on zosyn day 4. ABD US shows fatty liver. Hepatitis negative. Plans per GI are for dieta dvancement and 7 days of cipro after discharge with followup labs. Plans for home once stable 18 wife not present, patient ready to eat, has some abd distention and pain with laying on right side, no dyspnea, some sputum/cough Objective:  
 
Patient Vitals for the past 24 hrs: 
 Temp Pulse Resp BP SpO2  
18 1104 97.8 °F (36.6 °C) (!) 50 17 115/74 95 % 18 0723 98.2 °F (36.8 °C) (!) 53 17 125/80 93 % 18 0338 98.6 °F (37 °C) 61 17 102/63 94 % 18 2325 98.4 °F (36.9 °C) (!) 54 17 100/64 92 % 18 1914 98 °F (36.7 °C) (!) 52 18 119/70 93 % 18 1542 97.7 °F (36.5 °C) (!) 50 18 136/79 91 % 18 1527 - 60 18 149/77 95 % 18 1522 98.8 °F (37.1 °C) (!) 56 18 155/76 96 % 18 1517 - (!) 57 18 136/75 92 % 18 1512 - 61 18 138/76 94 % 18 1507 - 63 18 135/72 94 % 18 1503 - 62 18 127/66 95 % 18 1458 - 64 16 111/54 94 % 18 1454 99.7 °F (37.6 °C) 67 14 129/63 92 % Oxygen Therapy O2 Sat (%): 95 % (18 1104) Pulse via Oximetry: 60 beats per minute (18 1527) O2 Device: Room air (18 0830) O2 Flow Rate (L/min): 2 l/min (18 1454) Intake/Output Summary (Last 24 hours) at 09/19/18 1312 Last data filed at 09/19/18 1104 Gross per 24 hour Intake            876.6 ml Output                0 ml Net            876.6 ml  
   
 
General:    Well nourished. Obese, CV:   RRR. No murmur, rub, or gallop. No edema Lungs:   CTAB. No wheezing, rhonchi, or rales. Anterior exam  
Abdomen:   Soft, nondistended. Obese, decreased BS Extremities: Warm and dry. Skin:     No rashes or jaundice. Data Review: 
I have reviewed all labs, meds, telemetry events, and studies from the last 24 hours: 
 
Recent Results (from the past 24 hour(s)) CBC W/O DIFF Collection Time: 09/19/18  4:19 AM  
Result Value Ref Range WBC 7.8 4.3 - 11.1 K/uL  
 RBC 4.73 4.23 - 5.6 M/uL  
 HGB 13.5 (L) 13.6 - 17.2 g/dL HCT 40.9 (L) 41.1 - 50.3 % MCV 86.5 79.6 - 97.8 FL  
 MCH 28.5 26.1 - 32.9 PG  
 MCHC 33.0 31.4 - 35.0 g/dL  
 RDW 14.7 % PLATELET 460 027 - 501 K/uL MPV 9.6 9.4 - 12.3 FL ABSOLUTE NRBC 0.00 0.0 - 0.2 K/uL METABOLIC PANEL, COMPREHENSIVE Collection Time: 09/19/18  4:19 AM  
Result Value Ref Range Sodium 139 136 - 145 mmol/L Potassium 3.6 3.5 - 5.1 mmol/L Chloride 103 98 - 107 mmol/L  
 CO2 30 21 - 32 mmol/L Anion gap 6 (L) 7 - 16 mmol/L Glucose 106 (H) 65 - 100 mg/dL BUN 14 8 - 23 MG/DL Creatinine 1.14 0.8 - 1.5 MG/DL  
 GFR est AA >60 >60 ml/min/1.73m2 GFR est non-AA >60 >60 ml/min/1.73m2 Calcium 8.4 8.3 - 10.4 MG/DL Bilirubin, total 2.5 (H) 0.2 - 1.1 MG/DL  
 ALT (SGPT) 141 (H) 12 - 65 U/L  
 AST (SGOT) 41 (H) 15 - 37 U/L Alk. phosphatase 136 50 - 136 U/L Protein, total 6.4 6.3 - 8.2 g/dL Albumin 2.4 (L) 3.2 - 4.6 g/dL Globulin 4.0 (H) 2.3 - 3.5 g/dL A-G Ratio 0.6 (L) 1.2 - 3.5 All Micro Results None No results found for this visit on 09/16/18. Current Meds: 
Current Facility-Administered Medications Medication Dose Route Frequency  simethicone (MYLICON) 73UK/0.8MS oral drops 40 mg  40 mg Oral QID PRN  
 famotidine (PF) (PEPCID) injection 20 mg  20 mg IntraVENous Q12H  
 sodium chloride (NS) flush 5-10 mL  5-10 mL IntraVENous Q8H  
 sodium chloride (NS) flush 5-10 mL  5-10 mL IntraVENous PRN  
 dextrose 5% - 0.45% NaCl with KCl 20 mEq/L infusion  100 mL/hr IntraVENous CONTINUOUS  
 acetaminophen (TYLENOL) tablet 650 mg  650 mg Oral Q4H PRN  
 oxyCODONE-acetaminophen (PERCOCET) 5-325 mg per tablet 1 Tab  1 Tab Oral Q4H PRN  
 HYDROmorphone (PF) (DILAUDID) injection 0.5 mg  0.5 mg IntraVENous Q4H PRN  
 naloxone (NARCAN) injection 0.4 mg  0.4 mg IntraVENous PRN  
 diphenhydrAMINE (BENADRYL) injection 12.5 mg  12.5 mg IntraVENous Q4H PRN  
 ondansetron (ZOFRAN) injection 4 mg  4 mg IntraVENous Q4H PRN  piperacillin-tazobactam (ZOSYN) 3.375 g in 0.9% sodium chloride (MBP/ADV) 100 mL  3.375 g IntraVENous Q8H  
 influenza vaccine 2018-19 (6 mos+)(PF) (FLUARIX QUAD/FLULAVAL QUAD) injection 0.5 mL  0.5 mL IntraMUSCular PRIOR TO DISCHARGE Other Studies (last 24 hours): Xr Ercp / Ercb Combined Result Date: 9/18/2018 ERCP images History: C-ARM FOR ERCP, 75 years Male  with history of cholangitis, choledocholithiasis Comparison: Abdominal ultrasound September 17, 2018 Findings:  ERCP was performed by gastroenterology service. Images are interpreted here. Please see dedicated report by GI service for complete details. ERCP images demonstrate normal-appearing intrahepatic and extra hepatic biliary ducts. Sphincterotomy and balloon sweep of the common bile duct was performed, with a solitary stone extracted. Impression: ERCP images as above. Fluoroscopy time: 2.44 min. Total number of fluoro images obtained: 4 Assessment and Plan:  
 
Hospital Problems as of 9/19/2018  Date Reviewed: 7/23/2018 Codes Class Noted - Resolved POA Abdominal pain ICD-10-CM: R10.9 ICD-9-CM: 789.00  9/17/2018 - Present Yes Elevated liver enzymes ICD-10-CM: R74.8 ICD-9-CM: 790.5  9/17/2018 - Present Yes LEONCIO (acute kidney injury) ([address] ICD-10-CM: N17.9 ICD-9-CM: 584.9  9/17/2018 - Present Yes Leukocytosis ICD-10-CM: S75.350 ICD-9-CM: 288.60  9/17/2018 - Present Yes * (Principal)Biliary obstruction ICD-10-CM: K83.1 ICD-9-CM: 576.2  9/17/2018 - Present Yes Cholangitis ICD-10-CM: K83.0 ICD-9-CM: 576.1  9/17/2018 - Present Yes Hyperbilirubinemia ICD-10-CM: E80.6 ICD-9-CM: 782.4  9/16/2018 - Present Yes Severe obesity (BMI 35.0-39.9) ([address] ICD-10-CM: E66.01 
ICD-9-CM: 278.01  6/12/2018 - Present Yes HLD (hyperlipidemia) ICD-10-CM: E78.5 ICD-9-CM: 272.4  12/19/2012 - Present Yes HTN (hypertension) ICD-10-CM: I10 
ICD-9-CM: 401.9  12/19/2012 - Present Yes CAD (coronary artery disease) ICD-10-CM: I25.10 ICD-9-CM: 414.00  12/19/2012 - Present Yes GERD (gastroesophageal reflux disease) ICD-10-CM: K21.9 ICD-9-CM: 530.81  12/19/2012 - Present Yes Plan: 
· Choledocholithiasis: s/p ERCP and stone extraction, GI soft diet per GI, change to cipro for 7 more days, followup LFTS outpatient · Hypokalemia: replaced · HTN: will need medication restart at discharge · Obesity: recommend LITA workup outpatient DC planning/Dispo:  Pending to home Diet:  DIET NUTRITIONAL SUPPLEMENTS 
DIET GI SOFT 
DVT ppx:  SCD Signed: Demetria Lennox, MD

## 2018-09-20 VITALS
HEART RATE: 84 BPM | WEIGHT: 262 LBS | BODY MASS INDEX: 35.49 KG/M2 | OXYGEN SATURATION: 98 % | HEIGHT: 72 IN | RESPIRATION RATE: 18 BRPM | DIASTOLIC BLOOD PRESSURE: 74 MMHG | SYSTOLIC BLOOD PRESSURE: 130 MMHG | TEMPERATURE: 98.6 F

## 2018-09-20 LAB
ALBUMIN SERPL-MCNC: 2.5 G/DL (ref 3.2–4.6)
ALBUMIN/GLOB SERPL: 0.6 {RATIO} (ref 1.2–3.5)
ALP SERPL-CCNC: 149 U/L (ref 50–136)
ALT SERPL-CCNC: 117 U/L (ref 12–65)
ANION GAP SERPL CALC-SCNC: 7 MMOL/L (ref 7–16)
AST SERPL-CCNC: 40 U/L (ref 15–37)
BILIRUB SERPL-MCNC: 1.6 MG/DL (ref 0.2–1.1)
BUN SERPL-MCNC: 12 MG/DL (ref 8–23)
CALCIUM SERPL-MCNC: 9 MG/DL (ref 8.3–10.4)
CHLORIDE SERPL-SCNC: 103 MMOL/L (ref 98–107)
CO2 SERPL-SCNC: 27 MMOL/L (ref 21–32)
CREAT SERPL-MCNC: 0.99 MG/DL (ref 0.8–1.5)
ERYTHROCYTE [DISTWIDTH] IN BLOOD BY AUTOMATED COUNT: 14.6 %
GLOBULIN SER CALC-MCNC: 4.2 G/DL (ref 2.3–3.5)
GLUCOSE SERPL-MCNC: 92 MG/DL (ref 65–100)
HCT VFR BLD AUTO: 42.5 % (ref 41.1–50.3)
HGB BLD-MCNC: 14.1 G/DL (ref 13.6–17.2)
MCH RBC QN AUTO: 28.1 PG (ref 26.1–32.9)
MCHC RBC AUTO-ENTMCNC: 33.2 G/DL (ref 31.4–35)
MCV RBC AUTO: 84.8 FL (ref 79.6–97.8)
NRBC # BLD: 0 K/UL (ref 0–0.2)
PLATELET # BLD AUTO: 221 K/UL (ref 150–450)
PMV BLD AUTO: 10.1 FL (ref 9.4–12.3)
POTASSIUM SERPL-SCNC: 3.3 MMOL/L (ref 3.5–5.1)
PROT SERPL-MCNC: 6.7 G/DL (ref 6.3–8.2)
RBC # BLD AUTO: 5.01 M/UL (ref 4.23–5.6)
SODIUM SERPL-SCNC: 137 MMOL/L (ref 136–145)
WBC # BLD AUTO: 9.9 K/UL (ref 4.3–11.1)

## 2018-09-20 PROCEDURE — 74011250637 HC RX REV CODE- 250/637: Performed by: INTERNAL MEDICINE

## 2018-09-20 PROCEDURE — 36415 COLL VENOUS BLD VENIPUNCTURE: CPT

## 2018-09-20 PROCEDURE — 80053 COMPREHEN METABOLIC PANEL: CPT

## 2018-09-20 PROCEDURE — 90471 IMMUNIZATION ADMIN: CPT

## 2018-09-20 PROCEDURE — 90686 IIV4 VACC NO PRSV 0.5 ML IM: CPT | Performed by: SURGERY

## 2018-09-20 PROCEDURE — 74011250636 HC RX REV CODE- 250/636: Performed by: SURGERY

## 2018-09-20 PROCEDURE — 85027 COMPLETE CBC AUTOMATED: CPT

## 2018-09-20 RX ORDER — CIPROFLOXACIN 500 MG/1
500 TABLET ORAL EVERY 12 HOURS
Qty: 12 TAB | Refills: 0 | Status: SHIPPED | OUTPATIENT
Start: 2018-09-20 | End: 2018-09-26

## 2018-09-20 RX ORDER — POTASSIUM CHLORIDE 20 MEQ/1
40 TABLET, EXTENDED RELEASE ORAL
Status: COMPLETED | OUTPATIENT
Start: 2018-09-20 | End: 2018-09-20

## 2018-09-20 RX ADMIN — POTASSIUM CHLORIDE 40 MEQ: 20 TABLET, EXTENDED RELEASE ORAL at 08:25

## 2018-09-20 RX ADMIN — INFLUENZA VIRUS VACCINE 0.5 ML: 15; 15; 15; 15 SUSPENSION INTRAMUSCULAR at 10:05

## 2018-09-20 RX ADMIN — Medication 5 ML: at 08:26

## 2018-09-20 RX ADMIN — CIPROFLOXACIN 500 MG: 500 TABLET, FILM COATED ORAL at 08:26

## 2018-09-20 RX ADMIN — FAMOTIDINE 20 MG: 20 TABLET ORAL at 08:25

## 2018-09-20 NOTE — DISCHARGE SUMMARY
Hospitalist Discharge Summary Admit Date:  2018  9:56 PM  
Name:  Pamela Cabrera Age:  75 y.o. 
:  1950 MRN:  [id number] PCP:  Lord Jamey MD 
Treatment Team: Attending Provider: Ralph Tilley DO; Consulting Provider: Husam Lara MD; Care Manager: Nieves Zaragoza, RN; Utilization Review: Lynnette Alanis Problem List for this Hospitalization: 
Hospital Problems as of 2018  Date Reviewed: 2018 Codes Class Noted - Resolved POA Abdominal pain ICD-10-CM: R10.9 ICD-9-CM: 789.00  2018 - Present Yes Elevated liver enzymes ICD-10-CM: R74.8 ICD-9-CM: 790.5  2018 - Present Yes LEONCIO (acute kidney injury) ([facility] ICD-10-CM: N17.9 ICD-9-CM: 584.9  2018 - Present Yes Leukocytosis ICD-10-CM: L31.381 ICD-9-CM: 288.60  2018 - Present Yes * (Principal)Biliary obstruction ICD-10-CM: K83.1 ICD-9-CM: 576.2  2018 - Present Yes Cholangitis ICD-10-CM: K83.0 ICD-9-CM: 576.1  2018 - Present Yes Hyperbilirubinemia ICD-10-CM: E80.6 ICD-9-CM: 782.4  2018 - Present Yes Severe obesity (BMI 35.0-39.9) ([facility].) ICD-10-CM: E66.01 
ICD-9-CM: 278.01  2018 - Present Yes HLD (hyperlipidemia) ICD-10-CM: E78.5 ICD-9-CM: 272.4  2012 - Present Yes HTN (hypertension) ICD-10-CM: I10 
ICD-9-CM: 401.9  2012 - Present Yes CAD (coronary artery disease) ICD-10-CM: I25.10 ICD-9-CM: 414.00  2012 - Present Yes GERD (gastroesophageal reflux disease) ICD-10-CM: K21.9 ICD-9-CM: 530.81  2012 - Present Yes Hospital Course: Mr. Angela Guadalupe is a 76 yo male with PMH of lap taylor 7-18 admitted with abdominal pain, elevated LFTS and concern for cholangitis. He has been seen by surgery and GI s/p ERCP with sphincterotomy and stone removal. he was on zosyn day 4 and will complete 7 days of cipro. ABD US shows fatty liver. Hepatitis negative. Plans for home with GI followup. Follow up instructions and discharge meds at bottom of this note. Plan was discussed with patient and wife. All questions answered. Patient was stable at time of discharge. Diagnostic Imaging/Tests:  
Us Abd Comp Result Date: 9/17/2018 ABDOMINAL ULTRASOUND. HISTORY: Prior laparoscopic cholecystectomy with elevated liver function tests. COMPARISON: 22 June 2018. FINDINGS: Pancreas obscured by overlying bowel gas. Aorta is normal caliber, 1.8 cm. The IVC is patent. The normal sized spleen has a homogenous echotexture and measures 12 cm. Left kidney is unremarkable without hydronephrosis and measures 12.8 cm. Right kidney is unremarkable without hydronephrosis and measures 12.1 cm. Renal echogenicity is normal, the right kidney is hypoechoic to the liver. The intrahepatic biliary tree is not dilated. There is hepatopetal flow in the portal vein. No gross focal parenchymal abnormality identified within the liver. Increased liver echogenicity suggesting fatty infiltration. The gallbladder is absent. The common bile duct is not dilated 6 mm. IMPRESSION: Negative for biliary tree obstruction. Increased liver echogenicity suggesting fatty infiltration. Echocardiogram results: No results found for this visit on 09/16/18. All Micro Results None Labs: Results:  
   
BMP, Mg, Phos Recent Labs  
   09/20/18 0425 09/19/18 0419 09/18/18 0421 NA  137  139  138  
K  3.3*  3.6  3.4*  
CL  103  103  101 CO2  27  30  28 AGAP  7  6*  9 BUN  12  14  19 CREA  0.99  1.14  1.29  
CA  9.0  8.4  8.5 GLU  92  106*  116* CBC Recent Labs  
   09/20/18 0425 09/19/18 0419 09/18/18 0421 WBC  9.9  7.8  9.0  
RBC  5.01  4.73  5.02  
HGB  14.1  13.5*  14.2 HCT  42.5  40.9*  42.8 PLT  221  187  186 LFT Recent Labs  
   09/20/18 0425 09/19/18 0419 09/18/18 0421 SGOT  40*  41*  41* ALT  117*  141*  183* AP  149*  136  153* TP  6.7  6.4  6.4 ALB  2.5*  2.4*  2.5*  
GLOB  4.2*  4.0*  3.9* AGRAT  0.6*  0.6*  0.6* Cardiac Testing No results found for: BNPP, BNP, CPK, RCK1, RCK2, RCK3, RCK4, CKMB, CKNDX, CKND1, TROPT, TROIQ Coagulation Tests Lab Results Component Value Date/Time Prothrombin time 14.3 09/17/2018 12:32 PM  
 INR 1.2 09/17/2018 12:32 PM  
  
A1c No results found for: HBA1C, HGBE8, SUY8MUNK, END4YTFY Lipid Panel Lab Results Component Value Date/Time Cholesterol, total 201 (H) 06/12/2018 02:03 PM  
 HDL Cholesterol 36 (L) 06/12/2018 02:03 PM  
 LDL, calculated 102 (H) 06/12/2018 02:03 PM  
 VLDL, calculated 63 (H) 06/12/2018 02:03 PM  
 Triglyceride 316 (H) 06/12/2018 02:03 PM  
  
Thyroid Panel Lab Results Component Value Date/Time TSH 3.000 01/27/2016 08:31 AM  
 TSH 4.590 (H) 03/20/2014 10:27 AM  
    
Most Recent UA No results found for: COLOR, APPRN, REFSG, JEMMA, PROTU, GLUCU, KETU, BILU, BLDU, UROU, Mihir Rhymes No Known Allergies Immunization History Administered Date(s) Administered  Influenza Vaccine 12/22/2016  Pneumococcal Conjugate (PCV-13) 06/14/2017 All Labs from Last 24 Hrs: 
Recent Results (from the past 24 hour(s)) CBC W/O DIFF Collection Time: 09/20/18  4:25 AM  
Result Value Ref Range WBC 9.9 4.3 - 11.1 K/uL  
 RBC 5.01 4.23 - 5.6 M/uL  
 HGB 14.1 13.6 - 17.2 g/dL HCT 42.5 41.1 - 50.3 % MCV 84.8 79.6 - 97.8 FL  
 MCH 28.1 26.1 - 32.9 PG  
 MCHC 33.2 31.4 - 35.0 g/dL  
 RDW 14.6 % PLATELET 977 079 - 724 K/uL MPV 10.1 9.4 - 12.3 FL ABSOLUTE NRBC 0.00 0.0 - 0.2 K/uL METABOLIC PANEL, COMPREHENSIVE Collection Time: 09/20/18  4:25 AM  
Result Value Ref Range Sodium 137 136 - 145 mmol/L Potassium 3.3 (L) 3.5 - 5.1 mmol/L Chloride 103 98 - 107 mmol/L  
 CO2 27 21 - 32 mmol/L Anion gap 7 7 - 16 mmol/L Glucose 92 65 - 100 mg/dL BUN 12 8 - 23 MG/DL  Creatinine 0.99 0.8 - 1.5 MG/DL  
 GFR est AA >60 >60 ml/min/1.73m2 GFR est non-AA >60 >60 ml/min/1.73m2 Calcium 9.0 8.3 - 10.4 MG/DL Bilirubin, total 1.6 (H) 0.2 - 1.1 MG/DL  
 ALT (SGPT) 117 (H) 12 - 65 U/L  
 AST (SGOT) 40 (H) 15 - 37 U/L Alk. phosphatase 149 (H) 50 - 136 U/L Protein, total 6.7 6.3 - 8.2 g/dL Albumin 2.5 (L) 3.2 - 4.6 g/dL Globulin 4.2 (H) 2.3 - 3.5 g/dL A-G Ratio 0.6 (L) 1.2 - 3.5 Current Med List in Hospital:  
Current Facility-Administered Medications Medication Dose Route Frequency  potassium chloride (K-DUR, KLOR-CON) SR tablet 40 mEq  40 mEq Oral NOW  simethicone (MYLICON) 87QY/4.4WM oral drops 40 mg  40 mg Oral QID PRN  
 famotidine (PEPCID) tablet 20 mg  20 mg Oral BID  ciprofloxacin HCl (CIPRO) tablet 500 mg  500 mg Oral Q12H  
 magic mouthwash (BEN) oral suspension 5 mL  5 mL Swish and Spit Q4H PRN  
 sodium chloride (NS) flush 5-10 mL  5-10 mL IntraVENous Q8H  
 sodium chloride (NS) flush 5-10 mL  5-10 mL IntraVENous PRN  
 acetaminophen (TYLENOL) tablet 650 mg  650 mg Oral Q4H PRN  
 oxyCODONE-acetaminophen (PERCOCET) 5-325 mg per tablet 1 Tab  1 Tab Oral Q4H PRN  
 HYDROmorphone (PF) (DILAUDID) injection 0.5 mg  0.5 mg IntraVENous Q4H PRN  
 naloxone (NARCAN) injection 0.4 mg  0.4 mg IntraVENous PRN  
 diphenhydrAMINE (BENADRYL) injection 12.5 mg  12.5 mg IntraVENous Q4H PRN  
 ondansetron (ZOFRAN) injection 4 mg  4 mg IntraVENous Q4H PRN  
 influenza vaccine 2018-19 (6 mos+)(PF) (FLUARIX QUAD/FLULAVAL QUAD) injection 0.5 mL  0.5 mL IntraMUSCular PRIOR TO DISCHARGE Discharge Exam: 
Patient Vitals for the past 24 hrs: 
 Temp Pulse Resp BP SpO2  
09/20/18 0659 98.6 °F (37 °C) 84 18 130/74 98 %  
09/20/18 0358 98.6 °F (37 °C) (!) 52 18 113/57 93 % 09/19/18 2358 98.4 °F (36.9 °C) (!) 48 18 128/65 94 % 09/19/18 1938 98.6 °F (37 °C) (!) 55 17 127/73 92 % 09/19/18 1540 98.1 °F (36.7 °C) (!) 50 17 122/74 93 % 09/19/18 1104 97.8 °F (36.6 °C) (!) 50 17 115/74 95 % Oxygen Therapy O2 Sat (%): 98 % (09/20/18 0659) Pulse via Oximetry: 60 beats per minute (09/18/18 1527) O2 Device: Room air (09/19/18 1322) O2 Flow Rate (L/min): 2 l/min (09/18/18 1454) Intake/Output Summary (Last 24 hours) at 09/20/18 0801 Last data filed at 09/19/18 1104 Gross per 24 hour Intake             76.6 ml Output                0 ml Net             76.6 ml  
   
 
 
General:                    Well nourished. Obese, CV:                                      RRR. No murmur, rub, or gallop. No edema Lungs:                       CTAB. No wheezing, rhonchi, or rales. Anterior exam  
Abdomen:                  Soft, nondistended. Obese, decreased BS Extremities:               Warm and dry. Skin:                                    No rashes or jaundice.  
  
 
 
 
 
Discharge Info:  
Current Discharge Medication List  
  
START taking these medications Details  
ciprofloxacin HCl (CIPRO) 500 mg tablet Take 1 Tab by mouth every twelve (12) hours for 6 days. Qty: 12 Tab, Refills: 0  
  
magic mouthwash (BEN) susp 5 mL by Swish and Spit route every four (4) hours as needed. Qty: 250 mL, Refills: 0 CONTINUE these medications which have NOT CHANGED Details  
tamsulosin (FLOMAX) 0.4 mg capsule Take 0.4 mg by mouth daily. aspirin delayed-release 81 mg tablet Take 81 mg by mouth nightly. citalopram (CELEXA) 20 mg tablet Take 1 Tab by mouth daily. Qty: 90 Tab, Refills: 3 Associated Diagnoses: Anxiety  
  
clonazePAM (KLONOPIN) 1 mg tablet Take 1 Tab by mouth three (3) times daily. Max Daily Amount: 3 mg. Qty: 90 Tab, Refills: 5 Associated Diagnoses: Anxiety  
  
lisinopril-hydroCHLOROthiazide (PRINZIDE, ZESTORETIC) 20-25 mg per tablet Take 1 Tab by mouth daily. Qty: 90 Tab, Refills: 3 Associated Diagnoses: Essential hypertension with goal blood pressure less than 140/90 omeprazole (PRILOSEC) 40 mg capsule Take 1 Cap by mouth daily. Qty: 90 Cap, Refills: 3  
  
simvastatin (ZOCOR) 40 mg tablet Take 1 Tab by mouth nightly. Qty: 90 Tab, Refills: 3 Associated Diagnoses: Mixed hyperlipidemia STOP taking these medications  
  
 metoprolol succinate (TOPROL-XL) 100 mg tablet Comments:  
Reason for Stopping:   
   
  
 
 
 
Disposition: home Activity: Activity as tolerated Diet: DIET NUTRITIONAL SUPPLEMENTS All Meals; Ensure [employer] DIET GI SOFT No options chosen Follow-up Appointments Procedures  FOLLOW UP VISIT Appointment in: Two Weeks [facility] as scheduled, PCP 1 week thanks [facility] as scheduled, PCP 1 week thanks Standing Status:   Standing Number of Occurrences:   1 Order Specific Question:   Appointment in Answer: Two Weeks Follow-up Information Follow up With Details Comments Contact Info Brenda Wallace MD   [address] 
[phone] Time spent in patient discharge planning and coordination 30 minutes. Signed: Tucker Pollard MD

## 2018-09-20 NOTE — PROGRESS NOTES
Spoke to Mr. Bibi Rankin and his wife in room 208. No discharge needs identified. Care Management Interventions Plan discussed with Pt/Family/Caregiver:  Yes

## 2018-09-20 NOTE — PROGRESS NOTES
Sarah 33 September 20, 2018 To Whom It May Concern, This is to certify that Ms. Hernandez accompanied her  during an acute illness from 9-17-18 to 9-21-18. Thank you for your assistance in this matter. Sincerely, Moose Rudd MD  
[phone]

## 2018-09-20 NOTE — DISCHARGE INSTRUCTIONS
Cholangitis: Care Instructions  Your Care Instructions    Cholangitis (say \"koh-amadou-JY-tus\") is an infection in the tubes that carry bile from the liver to the gallbladder and the small intestine. The gallbladder stores bile, which helps the body digest food. Sometimes a gallstone gets stuck in the tubes, and bile cannot get out. This can lead to an infection. If the infection is not treated, it may damage your liver or spread through your blood vessels. Other problems also can cause a blockage of the bile tubes and lead to cholangitis. You will take antibiotics to treat the infection. You may also need a special test to look for and remove a gallstone stuck in the bile tubes. When the infection is gone, you may need surgery to take out your gallbladder. This will prevent more gallstones and another infection. Follow-up care is a key part of your treatment and safety. Be sure to make and go to all appointments, and call your doctor if you are having problems. It's also a good idea to know your test results and keep a list of the medicines you take. How can you care for yourself at home? · Take your antibiotics as directed. Do not stop taking them just because you feel better. You need to take the full course of antibiotics. When should you call for help? Call 911 anytime you think you may need emergency care.  For example, call if:    · You passed out (lost consciousness).    Call your doctor now or seek immediate medical care if:    · You have severe belly pain.     · You have a fever not caused by the flu or some other illness that you know you have.     · You are vomiting or feel sick to your stomach.     · You are confused, or your confusion gets worse.     · Your skin or the whites of your eyes turn yellow or get more yellow.     · Your urine is dark yellow-brown, or your stools are light-colored.    Watch closely for changes in your health, and be sure to contact your doctor if:    · You do not get better as expected. Where can you learn more? Go to http://jeannie-gerardo.info/. Enter G187 in the search box to learn more about \"Cholangitis: Care Instructions. \"  Current as of: May 12, 2017  Content Version: 11.7  © 9574-2817 [employer]. Care instructions adapted under license by [employer] (which disclaims liability or warranty for this information). If you have questions about a medical condition or this instruction, always ask your healthcare professional. [address] any warranty or liability for your use of this information. DISCHARGE SUMMARY from Nurse    PATIENT INSTRUCTIONS:    After general anesthesia or intravenous sedation, for 24 hours or while taking prescription Narcotics:  · Limit your activities  · Do not drive and operate hazardous machinery  · Do not make important personal or business decisions  · Do  not drink alcoholic beverages  · If you have not urinated within 8 hours after discharge, please contact your surgeon on call. Report the following to your surgeon:  · Excessive pain, swelling, redness or odor of or around the surgical area  · Temperature over 100.5  · Nausea and vomiting lasting longer than 4 hours or if unable to take medications  · Any signs of decreased circulation or nerve impairment to extremity: change in color, persistent  numbness, tingling, coldness or increase pain  · Any questions    What to do at Home:  Recommended activity: See surgical instructions, diet as tolerated. *  Please give a list of your current medications to your Primary Care Provider. *  Please update this list whenever your medications are discontinued, doses are      changed, or new medications (including over-the-counter products) are added. *  Please carry medication information at all times in case of emergency situations.     These are general instructions for a healthy lifestyle:    No smoking/ No tobacco products/ Avoid exposure to second hand smoke  Surgeon General's Warning:  Quitting smoking now greatly reduces serious risk to your health. Obesity, smoking, and sedentary lifestyle greatly increases your risk for illness    A healthy diet, regular physical exercise & weight monitoring are important for maintaining a healthy lifestyle    You may be retaining fluid if you have a history of heart failure or if you experience any of the following symptoms:  Weight gain of 3 pounds or more overnight or 5 pounds in a week, increased swelling in our hands or feet or shortness of breath while lying flat in bed. Please call your doctor as soon as you notice any of these symptoms; do not wait until your next office visit. Recognize signs and symptoms of STROKE:    F-face looks uneven    A-arms unable to move or move unevenly    S-speech slurred or non-existent    T-time-call 911 as soon as signs and symptoms begin-DO NOT go       Back to bed or wait to see if you get better-TIME IS BRAIN. Warning Signs of HEART ATTACK     Call 911 if you have these symptoms:   Chest discomfort. Most heart attacks involve discomfort in the center of the chest that lasts more than a few minutes, or that goes away and comes back. It can feel like uncomfortable pressure, squeezing, fullness, or pain.  Discomfort in other areas of the upper body. Symptoms can include pain or discomfort in one or both arms, the back, neck, jaw, or stomach.  Shortness of breath with or without chest discomfort.  Other signs may include breaking out in a cold sweat, nausea, or lightheadedness. Don't wait more than five minutes to call 911 - MINUTES MATTER! Fast action can save your life. Calling 911 is almost always the fastest way to get lifesaving treatment. Emergency Medical Services staff can begin treatment when they arrive -- up to an hour sooner than if someone gets to the hospital by car.      The discharge information has been reviewed with the patient. The patient verbalized understanding. Discharge medications reviewed with the patient and appropriate educational materials and side effects teaching were provided.   ___________________________________________________________________________________________________________________________________

## 2018-09-21 ENCOUNTER — PATIENT OUTREACH (OUTPATIENT)
Dept: CASE MANAGEMENT | Age: 68
End: 2018-09-21

## 2018-09-21 NOTE — PROGRESS NOTES
This note will not be viewable in [address] Transition of Care Discharge Follow-up Questionnaire Date/Time of Call: 
 9/21/18 10:22am  
What was the patient hospitalized for? Biliary Obstruction Does the patient understand his/her diagnosis and/or treatment and what happened during the hospitalization? Yes, spoke with patient, he states his understanding of diagnosis and treatment; and is agreeable to call. Patient states he is doing well, still feels weak but better overall Did the patient receive discharge instructions? Yes   
CM Assessed Risk for Readmission:  
 
 
Patient stated Risk for Readmission: Low r/t diagnosis None I can think of  
Review any discharge instructions (see discharge instructions/AVS in [facility]). Ask patient if they understand these. Do they have any questions? Reviewed, understanding is stated, no questions at this time. Were home services ordered (nursing, PT, OT, ST, etc.)? No   
If so, has the first visit occurred? If not, why? (Assist with coordination of services if necessary.) 
 N/A Was any DME ordered? No 
  
If so, has it been received? If not, why?  (Assist patient in obtaining DME orders &/or equipment if necessary.) N/A Complete a review of all medications (new, continued and discontinued meds per the D/C instructions and medication tab in [facility]). Completed Start: ciprofloxacin HCl 500 mg tablet (bid x6days) Magic mouthwash Susp Stop: metoprolol succinate 100 mg tablet Were all new prescriptions filled? If not, why?  (Assist patient in obtaining medications if necessary  escalate for CCM &/or SW if ongoing issues are verbalized by pt or anticipated) Yes Does the patient understand the purpose and dosing instructions for all medications? (If patient has questions, provide explanation and education.) Yes, understanding of medications Does the patient have any problems in performing ADLs? (If patient is unable to perform ADLs  what is the limiting factor(s)? Do they have a support system that can assist? If no support system is present, discuss possible assistance that they may be able to obtain. Escalate for CCM/SW if ongoing issues are verbalized by pt or anticipated) Independent with ADLs Does the patient have all follow-up appointments scheduled? 7 day f/up with PCP?  
(f/up with PCP may be w/in 14 days if patient has a f/up with their specialist w/in 7 days) 7-14 day f/up with specialist?  
(or per discharge instructions) If f/up has not been made  what actions has the care coordinator made to accomplish this? Has transportation been arranged? Yes Tuesday September 25, 2018  9:45 AM EDT Extended Office Visit with Enma Lilly MD 
[facility] [address] [address] 
[phone] Rachael Guido MD  AS SCHEDULED- patient indicates he is waiting on a call to schedule, offered to call to schedule, patient will wait until Monday to see if they get a call 
[address] [facility] [facility] [id number] [phone] Yes, no transportation issues at this time Any other questions or concerns expressed by the patient? No further questions or concerns at this time. Patient states his gratitude for follow up Contact information for [facility] was given, instructed to call with questions or concerns. Schedule next appointment with KATHI HOWELL Coordinator or refer to RN Case Manager/ per the workflow guidelines. When is care coordinators next follow-up call scheduled? If referred for CCM  what RN care manager was the referral assigned? Community Care Coordinator will follow per workflow guidelines 3 to 4 weeks KIKA Call Completed By: Ambika Fofana LPN Community Care Coordinator

## 2018-10-18 ENCOUNTER — PATIENT OUTREACH (OUTPATIENT)
Dept: CASE MANAGEMENT | Age: 68
End: 2018-10-18

## 2018-10-18 NOTE — PROGRESS NOTES
This note will not be viewable in [address] Transitions of Care  Follow up Outreach Note Outreach type Phone call: spoke with patient Home visit:  
Date/Time of Outreach: 10/18/18 pm  
 
Has patient attended PCP or specialist follow-up appointments since last contact? What was outcome of appointment? When is next follow-up scheduled? Patient has had follow up with pcp, return visit is scheduled for 10/24/18. Patient states he is doing well and was told everything looks good [facility] at patient request as patient needs to reschedule upcoming visit, message was sent to  to call patient and he is aware. Review medications. Any medication changes since last outreach? Does patient have any questions or issues related to their medications? No changes indicated Home health active? If yes  any issue? Progress? No  
 
Referrals needed? 
(CM, SW, HH, etc. ) No 
  
Other issues/Miscellaneous? (Transportation, access to meals, ability to perform ADLs, adequate caregiver support, etc.) No new needs or concerns were identified. Patient stated his gratitude for follow up. Next Outreach Scheduled? Graduation from program? 
 N/A Yes Next Steps/Goals (if applicable): N/A Outreach completed by: 
 Jayashree Ritter LPN Community Care Coordinator

## 2019-07-01 NOTE — PERIOP NOTES
TRANSFER - OUT REPORT: 
 
Verbal report given to Tere Herrera RN on Yesenia Linear  being transferred to [facility] for routine post - op Report consisted of patients Situation, Background, Assessment and  
Recommendations(SBAR). Information from the following report(s) SBAR, OR Summary, Procedure Summary, Intake/Output and MAR was reviewed with the receiving nurse. Lines:  
Peripheral IV 09/16/18 Right Antecubital (Active) Site Assessment Clean, dry, & intact 9/18/2018  2:54 PM  
Phlebitis Assessment 0 9/18/2018  2:54 PM  
Infiltration Assessment 0 9/18/2018  2:54 PM  
Dressing Status Clean, dry, & intact; Occlusive 9/18/2018  2:54 PM  
Dressing Type Transparent;Tape 9/18/2018  2:54 PM  
Hub Color/Line Status Green; Infusing 9/18/2018  2:54 PM  
Alcohol Cap Used No 9/18/2018  2:54 PM  
  
 
Opportunity for questions and clarification was provided. Patient transported with: 
 Tech 
 
VTE prophylaxis orders have been written for Yesenia Rios. Patient and family given floor number and nurses name. Family updated re: pt status after security code verified. Attending Attestation (For Attendings USE Only)...

## 2020-01-26 PROBLEM — K83.09 CHOLANGITIS: Status: RESOLVED | Noted: 2018-09-17 | Resolved: 2020-01-26

## 2020-01-26 PROBLEM — D72.829 LEUKOCYTOSIS: Status: RESOLVED | Noted: 2018-09-17 | Resolved: 2020-01-26

## 2020-01-26 PROBLEM — E80.6 HYPERBILIRUBINEMIA: Status: RESOLVED | Noted: 2018-09-16 | Resolved: 2020-01-26

## 2020-01-26 PROBLEM — K83.1 BILIARY OBSTRUCTION: Status: RESOLVED | Noted: 2018-09-17 | Resolved: 2020-01-26

## 2020-01-26 PROBLEM — R74.8 ELEVATED LIVER ENZYMES: Status: RESOLVED | Noted: 2018-09-17 | Resolved: 2020-01-26

## 2020-01-26 PROBLEM — K80.10 CHRONIC CALCULOUS CHOLECYSTITIS: Status: RESOLVED | Noted: 2018-06-27 | Resolved: 2020-01-26

## 2020-01-26 PROBLEM — N17.9 AKI (ACUTE KIDNEY INJURY) (HCC): Status: RESOLVED | Noted: 2018-09-17 | Resolved: 2020-01-26

## 2020-01-26 PROBLEM — R10.9 ABDOMINAL PAIN: Status: RESOLVED | Noted: 2018-09-17 | Resolved: 2020-01-26

## 2021-01-01 ENCOUNTER — APPOINTMENT (OUTPATIENT)
Dept: GENERAL RADIOLOGY | Age: 71
DRG: 208 | End: 2021-01-01
Attending: INTERNAL MEDICINE
Payer: MEDICARE

## 2021-01-01 ENCOUNTER — APPOINTMENT (OUTPATIENT)
Dept: GENERAL RADIOLOGY | Age: 71
DRG: 208 | End: 2021-01-01
Attending: FAMILY MEDICINE
Payer: MEDICARE

## 2021-01-01 ENCOUNTER — HOSPITAL ENCOUNTER (INPATIENT)
Age: 71
LOS: 9 days | DRG: 208 | End: 2021-12-08
Attending: EMERGENCY MEDICINE | Admitting: INTERNAL MEDICINE
Payer: MEDICARE

## 2021-01-01 ENCOUNTER — APPOINTMENT (OUTPATIENT)
Dept: GENERAL RADIOLOGY | Age: 71
DRG: 208 | End: 2021-01-01
Attending: EMERGENCY MEDICINE
Payer: MEDICARE

## 2021-01-01 VITALS
WEIGHT: 233.91 LBS | OXYGEN SATURATION: 39 % | HEART RATE: 98 BPM | SYSTOLIC BLOOD PRESSURE: 85 MMHG | DIASTOLIC BLOOD PRESSURE: 54 MMHG | TEMPERATURE: 97.8 F | RESPIRATION RATE: 15 BRPM | HEIGHT: 72 IN | BODY MASS INDEX: 31.68 KG/M2

## 2021-01-01 DIAGNOSIS — U07.1 COVID-19: ICD-10-CM

## 2021-01-01 DIAGNOSIS — U07.1 ACUTE HYPOXEMIC RESPIRATORY FAILURE DUE TO COVID-19 (HCC): ICD-10-CM

## 2021-01-01 DIAGNOSIS — I25.10 CORONARY ARTERY DISEASE DUE TO LIPID RICH PLAQUE: Chronic | ICD-10-CM

## 2021-01-01 DIAGNOSIS — J96.01 ACUTE HYPOXEMIC RESPIRATORY FAILURE DUE TO COVID-19 (HCC): ICD-10-CM

## 2021-01-01 DIAGNOSIS — I25.83 CORONARY ARTERY DISEASE DUE TO LIPID RICH PLAQUE: Chronic | ICD-10-CM

## 2021-01-01 DIAGNOSIS — J12.82 PNEUMONIA DUE TO COVID-19 VIRUS: ICD-10-CM

## 2021-01-01 DIAGNOSIS — J96.01 ACUTE RESPIRATORY FAILURE WITH HYPOXIA (HCC): Primary | ICD-10-CM

## 2021-01-01 DIAGNOSIS — E66.09 CLASS 1 OBESITY DUE TO EXCESS CALORIES WITH SERIOUS COMORBIDITY AND BODY MASS INDEX (BMI) OF 32.0 TO 32.9 IN ADULT: ICD-10-CM

## 2021-01-01 DIAGNOSIS — N17.9 AKI (ACUTE KIDNEY INJURY) (HCC): ICD-10-CM

## 2021-01-01 DIAGNOSIS — U07.1 PNEUMONIA DUE TO COVID-19 VIRUS: ICD-10-CM

## 2021-01-01 LAB
ALBUMIN SERPL-MCNC: 2.2 G/DL (ref 3.2–4.6)
ALBUMIN SERPL-MCNC: 2.4 G/DL (ref 3.2–4.6)
ALBUMIN SERPL-MCNC: 2.4 G/DL (ref 3.2–4.6)
ALBUMIN SERPL-MCNC: 2.5 G/DL (ref 3.2–4.6)
ALBUMIN SERPL-MCNC: 2.6 G/DL (ref 3.2–4.6)
ALBUMIN SERPL-MCNC: 2.7 G/DL (ref 3.2–4.6)
ALBUMIN SERPL-MCNC: 3.2 G/DL (ref 3.2–4.6)
ALBUMIN/GLOB SERPL: 0.6 {RATIO} (ref 1.2–3.5)
ALBUMIN/GLOB SERPL: 0.7 {RATIO} (ref 1.2–3.5)
ALBUMIN/GLOB SERPL: 0.7 {RATIO} (ref 1.2–3.5)
ALBUMIN/GLOB SERPL: 0.8 {RATIO} (ref 1.2–3.5)
ALP SERPL-CCNC: 103 U/L (ref 50–136)
ALP SERPL-CCNC: 58 U/L (ref 50–136)
ALP SERPL-CCNC: 66 U/L (ref 50–136)
ALP SERPL-CCNC: 67 U/L (ref 50–136)
ALP SERPL-CCNC: 72 U/L (ref 50–136)
ALP SERPL-CCNC: 72 U/L (ref 50–136)
ALP SERPL-CCNC: 73 U/L (ref 50–136)
ALT SERPL-CCNC: 41 U/L (ref 12–65)
ALT SERPL-CCNC: 47 U/L (ref 12–65)
ALT SERPL-CCNC: 49 U/L (ref 12–65)
ALT SERPL-CCNC: 52 U/L (ref 12–65)
ALT SERPL-CCNC: 52 U/L (ref 12–65)
ALT SERPL-CCNC: 56 U/L (ref 12–65)
ALT SERPL-CCNC: 64 U/L (ref 12–65)
ANION GAP SERPL CALC-SCNC: 4 MMOL/L (ref 7–16)
ANION GAP SERPL CALC-SCNC: 6 MMOL/L (ref 7–16)
ANION GAP SERPL CALC-SCNC: 7 MMOL/L (ref 7–16)
ANION GAP SERPL CALC-SCNC: 8 MMOL/L (ref 7–16)
ARTERIAL PATENCY WRIST A: POSITIVE
AST SERPL-CCNC: 41 U/L (ref 15–37)
AST SERPL-CCNC: 41 U/L (ref 15–37)
AST SERPL-CCNC: 51 U/L (ref 15–37)
AST SERPL-CCNC: 54 U/L (ref 15–37)
AST SERPL-CCNC: 61 U/L (ref 15–37)
AST SERPL-CCNC: 63 U/L (ref 15–37)
AST SERPL-CCNC: 64 U/L (ref 15–37)
ATRIAL RATE: 83 BPM
BACTERIA SPEC CULT: NORMAL
BACTERIA SPEC CULT: NORMAL
BASE EXCESS BLD CALC-SCNC: 1.8 MMOL/L
BASE EXCESS BLD CALC-SCNC: 3 MMOL/L
BASE EXCESS BLD CALC-SCNC: 4.1 MMOL/L
BASE EXCESS BLD CALC-SCNC: 4.3 MMOL/L
BASE EXCESS BLD CALC-SCNC: 5.1 MMOL/L
BASOPHILS # BLD: 0 K/UL (ref 0–0.2)
BASOPHILS NFR BLD: 0 % (ref 0–2)
BDY SITE: ABNORMAL
BILIRUB SERPL-MCNC: 0.4 MG/DL (ref 0.2–1.1)
BILIRUB SERPL-MCNC: 0.5 MG/DL (ref 0.2–1.1)
BILIRUB SERPL-MCNC: 0.6 MG/DL (ref 0.2–1.1)
BILIRUB SERPL-MCNC: 0.8 MG/DL (ref 0.2–1.1)
BILIRUB SERPL-MCNC: 0.9 MG/DL (ref 0.2–1.1)
BILIRUB SERPL-MCNC: 0.9 MG/DL (ref 0.2–1.1)
BILIRUB SERPL-MCNC: 1.6 MG/DL (ref 0.2–1.1)
BNP SERPL-MCNC: 1476 PG/ML (ref 5–125)
BUN SERPL-MCNC: 17 MG/DL (ref 8–23)
BUN SERPL-MCNC: 18 MG/DL (ref 8–23)
BUN SERPL-MCNC: 20 MG/DL (ref 8–23)
BUN SERPL-MCNC: 21 MG/DL (ref 8–23)
BUN SERPL-MCNC: 21 MG/DL (ref 8–23)
BUN SERPL-MCNC: 23 MG/DL (ref 8–23)
BUN SERPL-MCNC: 24 MG/DL (ref 8–23)
BUN SERPL-MCNC: 36 MG/DL (ref 8–23)
BUN SERPL-MCNC: 45 MG/DL (ref 8–23)
CALCIUM SERPL-MCNC: 7.9 MG/DL (ref 8.3–10.4)
CALCIUM SERPL-MCNC: 8.3 MG/DL (ref 8.3–10.4)
CALCIUM SERPL-MCNC: 8.4 MG/DL (ref 8.3–10.4)
CALCIUM SERPL-MCNC: 8.5 MG/DL (ref 8.3–10.4)
CALCIUM SERPL-MCNC: 8.5 MG/DL (ref 8.3–10.4)
CALCIUM SERPL-MCNC: 8.6 MG/DL (ref 8.3–10.4)
CALCIUM SERPL-MCNC: 8.7 MG/DL (ref 8.3–10.4)
CALCIUM SERPL-MCNC: 8.7 MG/DL (ref 8.3–10.4)
CALCIUM SERPL-MCNC: 8.8 MG/DL (ref 8.3–10.4)
CALCULATED P AXIS, ECG09: 56 DEGREES
CALCULATED R AXIS, ECG10: -63 DEGREES
CALCULATED T AXIS, ECG11: 55 DEGREES
CHLORIDE SERPL-SCNC: 101 MMOL/L (ref 98–107)
CHLORIDE SERPL-SCNC: 101 MMOL/L (ref 98–107)
CHLORIDE SERPL-SCNC: 103 MMOL/L (ref 98–107)
CHLORIDE SERPL-SCNC: 104 MMOL/L (ref 98–107)
CHLORIDE SERPL-SCNC: 105 MMOL/L (ref 98–107)
CHLORIDE SERPL-SCNC: 106 MMOL/L (ref 98–107)
CHLORIDE SERPL-SCNC: 107 MMOL/L (ref 98–107)
CHLORIDE SERPL-SCNC: 98 MMOL/L (ref 98–107)
CHLORIDE SERPL-SCNC: 98 MMOL/L (ref 98–107)
CO2 SERPL-SCNC: 24 MMOL/L (ref 21–32)
CO2 SERPL-SCNC: 27 MMOL/L (ref 21–32)
CO2 SERPL-SCNC: 28 MMOL/L (ref 21–32)
CO2 SERPL-SCNC: 28 MMOL/L (ref 21–32)
CO2 SERPL-SCNC: 29 MMOL/L (ref 21–32)
CO2 SERPL-SCNC: 29 MMOL/L (ref 21–32)
CO2 SERPL-SCNC: 30 MMOL/L (ref 21–32)
CO2 SERPL-SCNC: 32 MMOL/L (ref 21–32)
CO2 SERPL-SCNC: 33 MMOL/L (ref 21–32)
CREAT SERPL-MCNC: 0.69 MG/DL (ref 0.8–1.5)
CREAT SERPL-MCNC: 0.8 MG/DL (ref 0.8–1.5)
CREAT SERPL-MCNC: 0.9 MG/DL (ref 0.8–1.5)
CREAT SERPL-MCNC: 0.91 MG/DL (ref 0.8–1.5)
CREAT SERPL-MCNC: 0.94 MG/DL (ref 0.8–1.5)
CREAT SERPL-MCNC: 0.95 MG/DL (ref 0.8–1.5)
CREAT SERPL-MCNC: 1.02 MG/DL (ref 0.8–1.5)
CREAT SERPL-MCNC: 1.05 MG/DL (ref 0.8–1.5)
CREAT SERPL-MCNC: 1.69 MG/DL (ref 0.8–1.5)
CRP SERPL HS-MCNC: 61.1 MG/L
CRP SERPL-MCNC: 4.2 MG/DL (ref 0–0.9)
CRP SERPL-MCNC: 6.9 MG/DL (ref 0–0.9)
CRP SERPL-MCNC: 8.2 MG/DL (ref 0–0.9)
D DIMER PPP FEU-MCNC: 1.2 UG/ML(FEU)
DIAGNOSIS, 93000: NORMAL
DIFFERENTIAL METHOD BLD: ABNORMAL
DIFFERENTIAL METHOD BLD: NORMAL
EOSINOPHIL # BLD: 0 K/UL (ref 0–0.8)
EOSINOPHIL # BLD: 0.2 K/UL (ref 0–0.8)
EOSINOPHIL NFR BLD: 0 % (ref 0.5–7.8)
EOSINOPHIL NFR BLD: 4 % (ref 0.5–7.8)
ERYTHROCYTE [DISTWIDTH] IN BLOOD BY AUTOMATED COUNT: 14 % (ref 11.9–14.6)
ERYTHROCYTE [DISTWIDTH] IN BLOOD BY AUTOMATED COUNT: 14 % (ref 11.9–14.6)
ERYTHROCYTE [DISTWIDTH] IN BLOOD BY AUTOMATED COUNT: 14.1 % (ref 11.9–14.6)
ERYTHROCYTE [DISTWIDTH] IN BLOOD BY AUTOMATED COUNT: 14.2 % (ref 11.9–14.6)
ERYTHROCYTE [DISTWIDTH] IN BLOOD BY AUTOMATED COUNT: 14.3 % (ref 11.9–14.6)
EST. AVERAGE GLUCOSE BLD GHB EST-MCNC: 123 MG/DL
GAS FLOW.O2 O2 DELIVERY SYS: ABNORMAL L/MIN
GLOBULIN SER CALC-MCNC: 3.7 G/DL (ref 2.3–3.5)
GLOBULIN SER CALC-MCNC: 3.8 G/DL (ref 2.3–3.5)
GLOBULIN SER CALC-MCNC: 3.9 G/DL (ref 2.3–3.5)
GLOBULIN SER CALC-MCNC: 3.9 G/DL (ref 2.3–3.5)
GLOBULIN SER CALC-MCNC: 4 G/DL (ref 2.3–3.5)
GLOBULIN SER CALC-MCNC: 4 G/DL (ref 2.3–3.5)
GLOBULIN SER CALC-MCNC: 4.1 G/DL (ref 2.3–3.5)
GLUCOSE BLD STRIP.AUTO-MCNC: 104 MG/DL (ref 65–100)
GLUCOSE BLD STRIP.AUTO-MCNC: 114 MG/DL (ref 65–100)
GLUCOSE BLD STRIP.AUTO-MCNC: 115 MG/DL (ref 65–100)
GLUCOSE BLD STRIP.AUTO-MCNC: 116 MG/DL (ref 65–100)
GLUCOSE BLD STRIP.AUTO-MCNC: 117 MG/DL (ref 65–100)
GLUCOSE BLD STRIP.AUTO-MCNC: 125 MG/DL (ref 65–100)
GLUCOSE BLD STRIP.AUTO-MCNC: 127 MG/DL (ref 65–100)
GLUCOSE BLD STRIP.AUTO-MCNC: 131 MG/DL (ref 65–100)
GLUCOSE BLD STRIP.AUTO-MCNC: 132 MG/DL (ref 65–100)
GLUCOSE BLD STRIP.AUTO-MCNC: 133 MG/DL (ref 65–100)
GLUCOSE BLD STRIP.AUTO-MCNC: 134 MG/DL (ref 65–100)
GLUCOSE BLD STRIP.AUTO-MCNC: 137 MG/DL (ref 65–100)
GLUCOSE BLD STRIP.AUTO-MCNC: 139 MG/DL (ref 65–100)
GLUCOSE BLD STRIP.AUTO-MCNC: 140 MG/DL (ref 65–100)
GLUCOSE BLD STRIP.AUTO-MCNC: 147 MG/DL (ref 65–100)
GLUCOSE BLD STRIP.AUTO-MCNC: 151 MG/DL (ref 65–100)
GLUCOSE BLD STRIP.AUTO-MCNC: 151 MG/DL (ref 65–100)
GLUCOSE BLD STRIP.AUTO-MCNC: 155 MG/DL (ref 65–100)
GLUCOSE BLD STRIP.AUTO-MCNC: 156 MG/DL (ref 65–100)
GLUCOSE BLD STRIP.AUTO-MCNC: 157 MG/DL (ref 65–100)
GLUCOSE BLD STRIP.AUTO-MCNC: 160 MG/DL (ref 65–100)
GLUCOSE BLD STRIP.AUTO-MCNC: 173 MG/DL (ref 65–100)
GLUCOSE BLD STRIP.AUTO-MCNC: 173 MG/DL (ref 65–100)
GLUCOSE BLD STRIP.AUTO-MCNC: 178 MG/DL (ref 65–100)
GLUCOSE BLD STRIP.AUTO-MCNC: 184 MG/DL (ref 65–100)
GLUCOSE BLD STRIP.AUTO-MCNC: 186 MG/DL (ref 65–100)
GLUCOSE BLD STRIP.AUTO-MCNC: 88 MG/DL (ref 65–100)
GLUCOSE BLD STRIP.AUTO-MCNC: 99 MG/DL (ref 65–100)
GLUCOSE SERPL-MCNC: 106 MG/DL (ref 65–100)
GLUCOSE SERPL-MCNC: 107 MG/DL (ref 65–100)
GLUCOSE SERPL-MCNC: 112 MG/DL (ref 65–100)
GLUCOSE SERPL-MCNC: 113 MG/DL (ref 65–100)
GLUCOSE SERPL-MCNC: 125 MG/DL (ref 65–100)
GLUCOSE SERPL-MCNC: 149 MG/DL (ref 65–100)
GLUCOSE SERPL-MCNC: 154 MG/DL (ref 65–100)
GLUCOSE SERPL-MCNC: 212 MG/DL (ref 65–100)
GLUCOSE SERPL-MCNC: 99 MG/DL (ref 65–100)
HBA1C MFR BLD: 5.9 % (ref 4.2–6.3)
HCO3 BLD-SCNC: 24.5 MMOL/L (ref 22–26)
HCO3 BLD-SCNC: 27.1 MMOL/L (ref 22–26)
HCO3 BLD-SCNC: 28.1 MMOL/L (ref 22–26)
HCO3 BLD-SCNC: 28.9 MMOL/L (ref 22–26)
HCO3 BLD-SCNC: 29.1 MMOL/L (ref 22–26)
HCT VFR BLD AUTO: 40.2 % (ref 41.1–50.3)
HCT VFR BLD AUTO: 40.4 % (ref 41.1–50.3)
HCT VFR BLD AUTO: 42.7 % (ref 41.1–50.3)
HCT VFR BLD AUTO: 43.7 % (ref 41.1–50.3)
HCT VFR BLD AUTO: 44 % (ref 41.1–50.3)
HCT VFR BLD AUTO: 44.1 % (ref 41.1–50.3)
HCT VFR BLD AUTO: 45.1 % (ref 41.1–50.3)
HCT VFR BLD AUTO: 46.3 % (ref 41.1–50.3)
HCT VFR BLD AUTO: 47.8 % (ref 41.1–50.3)
HGB BLD-MCNC: 13.2 G/DL (ref 13.6–17.2)
HGB BLD-MCNC: 13.9 G/DL (ref 13.6–17.2)
HGB BLD-MCNC: 14.1 G/DL (ref 13.6–17.2)
HGB BLD-MCNC: 14.3 G/DL (ref 13.6–17.2)
HGB BLD-MCNC: 14.6 G/DL (ref 13.6–17.2)
HGB BLD-MCNC: 14.7 G/DL (ref 13.6–17.2)
HGB BLD-MCNC: 14.7 G/DL (ref 13.6–17.2)
HGB BLD-MCNC: 14.9 G/DL (ref 13.6–17.2)
HGB BLD-MCNC: 15.8 G/DL (ref 13.6–17.2)
IMM GRANULOCYTES # BLD AUTO: 0 K/UL (ref 0–0.5)
IMM GRANULOCYTES # BLD AUTO: 0.1 K/UL (ref 0–0.5)
IMM GRANULOCYTES # BLD AUTO: 0.1 K/UL (ref 0–0.5)
IMM GRANULOCYTES NFR BLD AUTO: 0 % (ref 0–5)
IMM GRANULOCYTES NFR BLD AUTO: 1 % (ref 0–5)
LACTATE SERPL-SCNC: 1.8 MMOL/L (ref 0.4–2)
LYMPHOCYTES # BLD: 0.5 K/UL (ref 0.5–4.6)
LYMPHOCYTES # BLD: 0.6 K/UL (ref 0.5–4.6)
LYMPHOCYTES # BLD: 0.8 K/UL (ref 0.5–4.6)
LYMPHOCYTES NFR BLD: 10 % (ref 13–44)
LYMPHOCYTES NFR BLD: 16 % (ref 13–44)
LYMPHOCYTES NFR BLD: 16 % (ref 13–44)
LYMPHOCYTES NFR BLD: 4 % (ref 13–44)
LYMPHOCYTES NFR BLD: 5 % (ref 13–44)
LYMPHOCYTES NFR BLD: 6 % (ref 13–44)
LYMPHOCYTES NFR BLD: 8 % (ref 13–44)
MAGNESIUM SERPL-MCNC: 1.4 MG/DL (ref 1.8–2.4)
MAGNESIUM SERPL-MCNC: 1.9 MG/DL (ref 1.8–2.4)
MAGNESIUM SERPL-MCNC: 2.8 MG/DL (ref 1.8–2.4)
MCH RBC QN AUTO: 26.7 PG (ref 26.1–32.9)
MCH RBC QN AUTO: 27.1 PG (ref 26.1–32.9)
MCH RBC QN AUTO: 27.3 PG (ref 26.1–32.9)
MCH RBC QN AUTO: 27.6 PG (ref 26.1–32.9)
MCH RBC QN AUTO: 27.8 PG (ref 26.1–32.9)
MCH RBC QN AUTO: 28.1 PG (ref 26.1–32.9)
MCH RBC QN AUTO: 28.4 PG (ref 26.1–32.9)
MCH RBC QN AUTO: 28.4 PG (ref 26.1–32.9)
MCH RBC QN AUTO: 28.8 PG (ref 26.1–32.9)
MCHC RBC AUTO-ENTMCNC: 31.7 G/DL (ref 31.4–35)
MCHC RBC AUTO-ENTMCNC: 32 G/DL (ref 31.4–35)
MCHC RBC AUTO-ENTMCNC: 32.8 G/DL (ref 31.4–35)
MCHC RBC AUTO-ENTMCNC: 33 G/DL (ref 31.4–35)
MCHC RBC AUTO-ENTMCNC: 33.1 G/DL (ref 31.4–35)
MCHC RBC AUTO-ENTMCNC: 33.4 G/DL (ref 31.4–35)
MCHC RBC AUTO-ENTMCNC: 33.4 G/DL (ref 31.4–35)
MCHC RBC AUTO-ENTMCNC: 33.5 G/DL (ref 31.4–35)
MCHC RBC AUTO-ENTMCNC: 34.4 G/DL (ref 31.4–35)
MCV RBC AUTO: 83.1 FL (ref 79.6–97.8)
MCV RBC AUTO: 83.1 FL (ref 79.6–97.8)
MCV RBC AUTO: 83.5 FL (ref 79.6–97.8)
MCV RBC AUTO: 83.6 FL (ref 79.6–97.8)
MCV RBC AUTO: 84.1 FL (ref 79.6–97.8)
MCV RBC AUTO: 84.2 FL (ref 79.6–97.8)
MCV RBC AUTO: 84.8 FL (ref 79.6–97.8)
MCV RBC AUTO: 85.1 FL (ref 79.6–97.8)
MCV RBC AUTO: 85.8 FL (ref 79.6–97.8)
MM INDURATION POC: 0 MM (ref 0–5)
MM INDURATION POC: 0 MM (ref 0–5)
MONOCYTES # BLD: 0.3 K/UL (ref 0.1–1.3)
MONOCYTES # BLD: 0.4 K/UL (ref 0.1–1.3)
MONOCYTES # BLD: 0.4 K/UL (ref 0.1–1.3)
MONOCYTES # BLD: 0.5 K/UL (ref 0.1–1.3)
MONOCYTES # BLD: 0.8 K/UL (ref 0.1–1.3)
MONOCYTES NFR BLD: 4 % (ref 4–12)
MONOCYTES NFR BLD: 5 % (ref 4–12)
MONOCYTES NFR BLD: 5 % (ref 4–12)
MONOCYTES NFR BLD: 6 % (ref 4–12)
MONOCYTES NFR BLD: 9 % (ref 4–12)
NEUTS SEG # BLD: 15.3 K/UL (ref 1.7–8.2)
NEUTS SEG # BLD: 3.7 K/UL (ref 1.7–8.2)
NEUTS SEG # BLD: 4 K/UL (ref 1.7–8.2)
NEUTS SEG # BLD: 6.2 K/UL (ref 1.7–8.2)
NEUTS SEG # BLD: 7.2 K/UL (ref 1.7–8.2)
NEUTS SEG # BLD: 8 K/UL (ref 1.7–8.2)
NEUTS SEG # BLD: 8.3 K/UL (ref 1.7–8.2)
NEUTS SEG NFR BLD: 74 % (ref 43–78)
NEUTS SEG NFR BLD: 75 % (ref 43–78)
NEUTS SEG NFR BLD: 86 % (ref 43–78)
NEUTS SEG NFR BLD: 87 % (ref 43–78)
NEUTS SEG NFR BLD: 88 % (ref 43–78)
NEUTS SEG NFR BLD: 89 % (ref 43–78)
NEUTS SEG NFR BLD: 91 % (ref 43–78)
NRBC # BLD: 0 K/UL (ref 0–0.2)
O2/TOTAL GAS SETTING VFR VENT: 100 %
O2/TOTAL GAS SETTING VFR VENT: 90 %
P-R INTERVAL, ECG05: 148 MS
PCO2 BLD: 31.9 MMHG (ref 35–45)
PCO2 BLD: 34.1 MMHG (ref 35–45)
PCO2 BLD: 38.5 MMHG (ref 35–45)
PCO2 BLD: 43.5 MMHG (ref 35–45)
PCO2 BLD: 44.2 MMHG (ref 35–45)
PEEP RESPIRATORY: 14 CMH2O
PEEP RESPIRATORY: 16 CMH2O
PH BLD: 7.42 [PH] (ref 7.35–7.45)
PH BLD: 7.43 [PH] (ref 7.35–7.45)
PH BLD: 7.48 [PH] (ref 7.35–7.45)
PH BLD: 7.49 [PH] (ref 7.35–7.45)
PH BLD: 7.51 [PH] (ref 7.35–7.45)
PHOSPHATE SERPL-MCNC: 2.1 MG/DL (ref 2.3–3.7)
PIP ISTAT,IPIP: 18
PIP ISTAT,IPIP: 21
PLATELET # BLD AUTO: 127 K/UL (ref 150–450)
PLATELET # BLD AUTO: 147 K/UL (ref 150–450)
PLATELET # BLD AUTO: 151 K/UL (ref 150–450)
PLATELET # BLD AUTO: 151 K/UL (ref 150–450)
PLATELET # BLD AUTO: 159 K/UL (ref 150–450)
PLATELET # BLD AUTO: 166 K/UL (ref 150–450)
PLATELET # BLD AUTO: 175 K/UL (ref 150–450)
PLATELET # BLD AUTO: 196 K/UL (ref 150–450)
PLATELET # BLD AUTO: 231 K/UL (ref 150–450)
PMV BLD AUTO: 10.1 FL (ref 9.4–12.3)
PMV BLD AUTO: 10.2 FL (ref 9.4–12.3)
PMV BLD AUTO: 10.4 FL (ref 9.4–12.3)
PMV BLD AUTO: 10.5 FL (ref 9.4–12.3)
PMV BLD AUTO: 11 FL (ref 9.4–12.3)
PMV BLD AUTO: 12.1 FL (ref 9.4–12.3)
PMV BLD AUTO: 9.7 FL (ref 9.4–12.3)
PMV BLD AUTO: 9.8 FL (ref 9.4–12.3)
PMV BLD AUTO: 9.9 FL (ref 9.4–12.3)
PO2 BLD: 60 MMHG (ref 75–100)
PO2 BLD: 64 MMHG (ref 75–100)
PO2 BLD: 80 MMHG (ref 75–100)
PO2 BLD: 81 MMHG (ref 75–100)
PO2 BLD: 93 MMHG (ref 75–100)
POTASSIUM SERPL-SCNC: 3.3 MMOL/L (ref 3.5–5.1)
POTASSIUM SERPL-SCNC: 3.4 MMOL/L (ref 3.5–5.1)
POTASSIUM SERPL-SCNC: 3.5 MMOL/L (ref 3.5–5.1)
POTASSIUM SERPL-SCNC: 3.6 MMOL/L (ref 3.5–5.1)
POTASSIUM SERPL-SCNC: 3.7 MMOL/L (ref 3.5–5.1)
POTASSIUM SERPL-SCNC: 3.7 MMOL/L (ref 3.5–5.1)
POTASSIUM SERPL-SCNC: 4.2 MMOL/L (ref 3.5–5.1)
PPD POC: NEGATIVE NEGATIVE
PPD POC: NEGATIVE NEGATIVE
PRESSURE SUPPORT SETTING VENT: 18 CMH2O
PRESSURE SUPPORT SETTING VENT: 20 CMH2O
PRESSURE SUPPORT SETTING VENT: 20 CMH2O
PROCALCITONIN SERPL-MCNC: <0.05 NG/ML (ref 0–0.49)
PROT SERPL-MCNC: 6 G/DL (ref 6.3–8.2)
PROT SERPL-MCNC: 6.3 G/DL (ref 6.3–8.2)
PROT SERPL-MCNC: 6.3 G/DL (ref 6.3–8.2)
PROT SERPL-MCNC: 6.4 G/DL (ref 6.3–8.2)
PROT SERPL-MCNC: 6.4 G/DL (ref 6.3–8.2)
PROT SERPL-MCNC: 6.7 G/DL (ref 6.3–8.2)
PROT SERPL-MCNC: 7.3 G/DL (ref 6.3–8.2)
Q-T INTERVAL, ECG07: 350 MS
QRS DURATION, ECG06: 106 MS
QTC CALCULATION (BEZET), ECG08: 411 MS
RBC # BLD AUTO: 4.83 M/UL (ref 4.23–5.6)
RBC # BLD AUTO: 4.84 M/UL (ref 4.23–5.6)
RBC # BLD AUTO: 5.08 M/UL (ref 4.23–5.6)
RBC # BLD AUTO: 5.17 M/UL (ref 4.23–5.6)
RBC # BLD AUTO: 5.2 M/UL (ref 4.23–5.6)
RBC # BLD AUTO: 5.26 M/UL (ref 4.23–5.6)
RBC # BLD AUTO: 5.4 M/UL (ref 4.23–5.6)
RBC # BLD AUTO: 5.5 M/UL (ref 4.23–5.6)
RBC # BLD AUTO: 5.57 M/UL (ref 4.23–5.6)
SAO2 % BLD: 92.2 % (ref 95–98)
SAO2 % BLD: 92.4 % (ref 95–98)
SAO2 % BLD: 96.9 % (ref 95–98)
SAO2 % BLD: 97 % (ref 95–98)
SAO2 % BLD: 97.3 % (ref 95–98)
SARS-COV-2, NAA: DETECTED
SERVICE CMNT-IMP: ABNORMAL
SERVICE CMNT-IMP: NORMAL
SODIUM SERPL-SCNC: 133 MMOL/L (ref 138–145)
SODIUM SERPL-SCNC: 137 MMOL/L (ref 138–145)
SODIUM SERPL-SCNC: 138 MMOL/L (ref 136–145)
SODIUM SERPL-SCNC: 138 MMOL/L (ref 136–145)
SODIUM SERPL-SCNC: 138 MMOL/L (ref 138–145)
SODIUM SERPL-SCNC: 138 MMOL/L (ref 138–145)
SODIUM SERPL-SCNC: 139 MMOL/L (ref 138–145)
SODIUM SERPL-SCNC: 139 MMOL/L (ref 138–145)
SODIUM SERPL-SCNC: 140 MMOL/L (ref 138–145)
SPECIMEN TYPE: ABNORMAL
TOTAL RESP. RATE, ITRR: 20
TOTAL RESP. RATE, ITRR: 23
TRIGL SERPL-MCNC: 96 MG/DL (ref 35–150)
VENTILATION MODE VENT: ABNORMAL
VENTRICULAR RATE, ECG03: 83 BPM
WBC # BLD AUTO: 16.9 K/UL (ref 4.3–11.1)
WBC # BLD AUTO: 17.5 K/UL (ref 4.3–11.1)
WBC # BLD AUTO: 25.5 K/UL (ref 4.3–11.1)
WBC # BLD AUTO: 5 K/UL (ref 4.3–11.1)
WBC # BLD AUTO: 5.4 K/UL (ref 4.3–11.1)
WBC # BLD AUTO: 7.3 K/UL (ref 4.3–11.1)
WBC # BLD AUTO: 8.3 K/UL (ref 4.3–11.1)
WBC # BLD AUTO: 9.1 K/UL (ref 4.3–11.1)
WBC # BLD AUTO: 9.4 K/UL (ref 4.3–11.1)

## 2021-01-01 PROCEDURE — 86140 C-REACTIVE PROTEIN: CPT

## 2021-01-01 PROCEDURE — 36600 WITHDRAWAL OF ARTERIAL BLOOD: CPT

## 2021-01-01 PROCEDURE — 2709999900 HC NON-CHARGEABLE SUPPLY

## 2021-01-01 PROCEDURE — 74011250637 HC RX REV CODE- 250/637: Performed by: STUDENT IN AN ORGANIZED HEALTH CARE EDUCATION/TRAINING PROGRAM

## 2021-01-01 PROCEDURE — 85027 COMPLETE CBC AUTOMATED: CPT

## 2021-01-01 PROCEDURE — 74011000258 HC RX REV CODE- 258: Performed by: INTERNAL MEDICINE

## 2021-01-01 PROCEDURE — 94660 CPAP INITIATION&MGMT: CPT

## 2021-01-01 PROCEDURE — 96374 THER/PROPH/DIAG INJ IV PUSH: CPT

## 2021-01-01 PROCEDURE — 65620000000 HC RM CCU GENERAL

## 2021-01-01 PROCEDURE — 71045 X-RAY EXAM CHEST 1 VIEW: CPT

## 2021-01-01 PROCEDURE — 80053 COMPREHEN METABOLIC PANEL: CPT

## 2021-01-01 PROCEDURE — 74011250636 HC RX REV CODE- 250/636: Performed by: INTERNAL MEDICINE

## 2021-01-01 PROCEDURE — 36415 COLL VENOUS BLD VENIPUNCTURE: CPT

## 2021-01-01 PROCEDURE — 94760 N-INVAS EAR/PLS OXIMETRY 1: CPT

## 2021-01-01 PROCEDURE — 83605 ASSAY OF LACTIC ACID: CPT

## 2021-01-01 PROCEDURE — 74011000250 HC RX REV CODE- 250: Performed by: INTERNAL MEDICINE

## 2021-01-01 PROCEDURE — 65660000000 HC RM CCU STEPDOWN

## 2021-01-01 PROCEDURE — 74011250637 HC RX REV CODE- 250/637: Performed by: FAMILY MEDICINE

## 2021-01-01 PROCEDURE — 97535 SELF CARE MNGMENT TRAINING: CPT

## 2021-01-01 PROCEDURE — 74011636637 HC RX REV CODE- 636/637: Performed by: INTERNAL MEDICINE

## 2021-01-01 PROCEDURE — 77010033678 HC OXYGEN DAILY

## 2021-01-01 PROCEDURE — 83735 ASSAY OF MAGNESIUM: CPT

## 2021-01-01 PROCEDURE — 97165 OT EVAL LOW COMPLEX 30 MIN: CPT

## 2021-01-01 PROCEDURE — 74011250637 HC RX REV CODE- 250/637: Performed by: INTERNAL MEDICINE

## 2021-01-01 PROCEDURE — 85025 COMPLETE CBC W/AUTO DIFF WBC: CPT

## 2021-01-01 PROCEDURE — 99285 EMERGENCY DEPT VISIT HI MDM: CPT

## 2021-01-01 PROCEDURE — 82962 GLUCOSE BLOOD TEST: CPT

## 2021-01-01 PROCEDURE — C9113 INJ PANTOPRAZOLE SODIUM, VIA: HCPCS | Performed by: INTERNAL MEDICINE

## 2021-01-01 PROCEDURE — 94664 DEMO&/EVAL PT USE INHALER: CPT

## 2021-01-01 PROCEDURE — 02HV33Z INSERTION OF INFUSION DEVICE INTO SUPERIOR VENA CAVA, PERCUTANEOUS APPROACH: ICD-10-PCS | Performed by: INTERNAL MEDICINE

## 2021-01-01 PROCEDURE — 0W9930Z DRAINAGE OF RIGHT PLEURAL CAVITY WITH DRAINAGE DEVICE, PERCUTANEOUS APPROACH: ICD-10-PCS | Performed by: INTERNAL MEDICINE

## 2021-01-01 PROCEDURE — 82803 BLOOD GASES ANY COMBINATION: CPT

## 2021-01-01 PROCEDURE — 5A1935Z RESPIRATORY VENTILATION, LESS THAN 24 CONSECUTIVE HOURS: ICD-10-PCS | Performed by: INTERNAL MEDICINE

## 2021-01-01 PROCEDURE — 81003 URINALYSIS AUTO W/O SCOPE: CPT

## 2021-01-01 PROCEDURE — 77030012390 HC DRN CHST BTL GTNG -B

## 2021-01-01 PROCEDURE — 93005 ELECTROCARDIOGRAM TRACING: CPT | Performed by: EMERGENCY MEDICINE

## 2021-01-01 PROCEDURE — 74011250636 HC RX REV CODE- 250/636: Performed by: STUDENT IN AN ORGANIZED HEALTH CARE EDUCATION/TRAINING PROGRAM

## 2021-01-01 PROCEDURE — 86580 TB INTRADERMAL TEST: CPT | Performed by: INTERNAL MEDICINE

## 2021-01-01 PROCEDURE — 77010033711 HC HIGH FLOW OXYGEN

## 2021-01-01 PROCEDURE — 77030040393 HC DRSG OPTIFOAM GENT MDII -B

## 2021-01-01 PROCEDURE — 84478 ASSAY OF TRIGLYCERIDES: CPT

## 2021-01-01 PROCEDURE — 74011250636 HC RX REV CODE- 250/636: Performed by: EMERGENCY MEDICINE

## 2021-01-01 PROCEDURE — 65270000029 HC RM PRIVATE

## 2021-01-01 PROCEDURE — 74011000250 HC RX REV CODE- 250: Performed by: EMERGENCY MEDICINE

## 2021-01-01 PROCEDURE — 99291 CRITICAL CARE FIRST HOUR: CPT | Performed by: INTERNAL MEDICINE

## 2021-01-01 PROCEDURE — 36573 INSJ PICC RS&I 5 YR+: CPT | Performed by: INTERNAL MEDICINE

## 2021-01-01 PROCEDURE — B548ZZA ULTRASONOGRAPHY OF SUPERIOR VENA CAVA, GUIDANCE: ICD-10-PCS | Performed by: INTERNAL MEDICINE

## 2021-01-01 PROCEDURE — 74011250636 HC RX REV CODE- 250/636: Performed by: FAMILY MEDICINE

## 2021-01-01 PROCEDURE — 99232 SBSQ HOSP IP/OBS MODERATE 35: CPT | Performed by: INTERNAL MEDICINE

## 2021-01-01 PROCEDURE — 84100 ASSAY OF PHOSPHORUS: CPT

## 2021-01-01 PROCEDURE — 97530 THERAPEUTIC ACTIVITIES: CPT

## 2021-01-01 PROCEDURE — 0BH17EZ INSERTION OF ENDOTRACHEAL AIRWAY INTO TRACHEA, VIA NATURAL OR ARTIFICIAL OPENING: ICD-10-PCS | Performed by: INTERNAL MEDICINE

## 2021-01-01 PROCEDURE — 3E0436Z INTRODUCTION OF NUTRITIONAL SUBSTANCE INTO CENTRAL VEIN, PERCUTANEOUS APPROACH: ICD-10-PCS | Performed by: INTERNAL MEDICINE

## 2021-01-01 PROCEDURE — 99233 SBSQ HOSP IP/OBS HIGH 50: CPT | Performed by: INTERNAL MEDICINE

## 2021-01-01 PROCEDURE — 97161 PT EVAL LOW COMPLEX 20 MIN: CPT

## 2021-01-01 PROCEDURE — 80048 BASIC METABOLIC PNL TOTAL CA: CPT

## 2021-01-01 PROCEDURE — 31500 INSERT EMERGENCY AIRWAY: CPT | Performed by: INTERNAL MEDICINE

## 2021-01-01 PROCEDURE — 99223 1ST HOSP IP/OBS HIGH 75: CPT | Performed by: INTERNAL MEDICINE

## 2021-01-01 PROCEDURE — 86141 C-REACTIVE PROTEIN HS: CPT

## 2021-01-01 PROCEDURE — C1751 CATH, INF, PER/CENT/MIDLINE: HCPCS

## 2021-01-01 PROCEDURE — 83036 HEMOGLOBIN GLYCOSYLATED A1C: CPT

## 2021-01-01 PROCEDURE — 85379 FIBRIN DEGRADATION QUANT: CPT

## 2021-01-01 PROCEDURE — 87040 BLOOD CULTURE FOR BACTERIA: CPT

## 2021-01-01 PROCEDURE — 32551 INSERTION OF CHEST TUBE: CPT | Performed by: INTERNAL MEDICINE

## 2021-01-01 PROCEDURE — 77030040361 HC SLV COMPR DVT MDII -B

## 2021-01-01 PROCEDURE — XW0DXM6 INTRODUCTION OF BARICITINIB INTO MOUTH AND PHARYNX, EXTERNAL APPROACH, NEW TECHNOLOGY GROUP 6: ICD-10-PCS | Performed by: FAMILY MEDICINE

## 2021-01-01 PROCEDURE — 5A09457 ASSISTANCE WITH RESPIRATORY VENTILATION, 24-96 CONSECUTIVE HOURS, CONTINUOUS POSITIVE AIRWAY PRESSURE: ICD-10-PCS | Performed by: FAMILY MEDICINE

## 2021-01-01 PROCEDURE — 94640 AIRWAY INHALATION TREATMENT: CPT

## 2021-01-01 PROCEDURE — 74011250637 HC RX REV CODE- 250/637: Performed by: HOSPITALIST

## 2021-01-01 PROCEDURE — 84145 PROCALCITONIN (PCT): CPT

## 2021-01-01 PROCEDURE — 83880 ASSAY OF NATRIURETIC PEPTIDE: CPT

## 2021-01-01 RX ORDER — SODIUM CHLORIDE 0.9 % (FLUSH) 0.9 %
30 SYRINGE (ML) INJECTION EVERY 8 HOURS
Status: DISCONTINUED | OUTPATIENT
Start: 2021-01-01 | End: 2021-01-01 | Stop reason: HOSPADM

## 2021-01-01 RX ORDER — ATORVASTATIN CALCIUM 40 MG/1
40 TABLET, FILM COATED ORAL DAILY
Status: DISCONTINUED | OUTPATIENT
Start: 2021-01-01 | End: 2021-01-01 | Stop reason: HOSPADM

## 2021-01-01 RX ORDER — INSULIN LISPRO 100 [IU]/ML
INJECTION, SOLUTION INTRAVENOUS; SUBCUTANEOUS
Status: DISCONTINUED | OUTPATIENT
Start: 2021-01-01 | End: 2021-01-01 | Stop reason: HOSPADM

## 2021-01-01 RX ORDER — PANTOPRAZOLE SODIUM 40 MG/1
40 TABLET, DELAYED RELEASE ORAL
Status: DISCONTINUED | OUTPATIENT
Start: 2021-01-01 | End: 2021-01-01

## 2021-01-01 RX ORDER — PROPOFOL 10 MG/ML
0-50 VIAL (ML) INTRAVENOUS
Status: DISCONTINUED | OUTPATIENT
Start: 2021-01-01 | End: 2021-01-01 | Stop reason: HOSPADM

## 2021-01-01 RX ORDER — HYDROCODONE BITARTRATE AND ACETAMINOPHEN 5; 325 MG/1; MG/1
1 TABLET ORAL
Status: DISCONTINUED | OUTPATIENT
Start: 2021-01-01 | End: 2021-01-01 | Stop reason: HOSPADM

## 2021-01-01 RX ORDER — NOREPINEPHRINE BITARTRATE/D5W 4MG/250ML
.5-3 PLASTIC BAG, INJECTION (ML) INTRAVENOUS
Status: DISCONTINUED | OUTPATIENT
Start: 2021-01-01 | End: 2021-01-01 | Stop reason: HOSPADM

## 2021-01-01 RX ORDER — ONDANSETRON 4 MG/1
4 TABLET, ORALLY DISINTEGRATING ORAL
Status: DISCONTINUED | OUTPATIENT
Start: 2021-01-01 | End: 2021-01-01 | Stop reason: HOSPADM

## 2021-01-01 RX ORDER — LORAZEPAM 2 MG/ML
2 INJECTION INTRAMUSCULAR
Status: DISCONTINUED | OUTPATIENT
Start: 2021-01-01 | End: 2021-01-01 | Stop reason: HOSPADM

## 2021-01-01 RX ORDER — FENTANYL CITRATE 50 UG/ML
100 INJECTION, SOLUTION INTRAMUSCULAR; INTRAVENOUS ONCE
Status: COMPLETED | OUTPATIENT
Start: 2021-01-01 | End: 2021-01-01

## 2021-01-01 RX ORDER — MORPHINE SULFATE 10 MG/ML
5 INJECTION, SOLUTION INTRAMUSCULAR; INTRAVENOUS
Status: DISCONTINUED | OUTPATIENT
Start: 2021-01-01 | End: 2021-01-01 | Stop reason: HOSPADM

## 2021-01-01 RX ORDER — MORPHINE SULFATE 10 MG/ML
5 INJECTION, SOLUTION INTRAMUSCULAR; INTRAVENOUS ONCE
Status: COMPLETED | OUTPATIENT
Start: 2021-01-01 | End: 2021-01-01

## 2021-01-01 RX ORDER — ACETAMINOPHEN 650 MG/1
650 SUPPOSITORY RECTAL
Status: DISCONTINUED | OUTPATIENT
Start: 2021-01-01 | End: 2021-01-01 | Stop reason: HOSPADM

## 2021-01-01 RX ORDER — ASPIRIN 81 MG/1
81 TABLET ORAL
Status: DISCONTINUED | OUTPATIENT
Start: 2021-01-01 | End: 2021-01-01 | Stop reason: HOSPADM

## 2021-01-01 RX ORDER — HYDROCORTISONE 1 %
CREAM (GRAM) TOPICAL AS NEEDED
Status: DISCONTINUED | OUTPATIENT
Start: 2021-01-01 | End: 2021-01-01 | Stop reason: HOSPADM

## 2021-01-01 RX ORDER — DEXAMETHASONE SODIUM PHOSPHATE 100 MG/10ML
6 INJECTION INTRAMUSCULAR; INTRAVENOUS DAILY
Status: DISCONTINUED | OUTPATIENT
Start: 2021-01-01 | End: 2021-01-01 | Stop reason: HOSPADM

## 2021-01-01 RX ORDER — DEXAMETHASONE 4 MG/1
6 TABLET ORAL DAILY
Status: DISCONTINUED | OUTPATIENT
Start: 2021-01-01 | End: 2021-01-01

## 2021-01-01 RX ORDER — LISINOPRIL AND HYDROCHLOROTHIAZIDE 10; 12.5 MG/1; MG/1
1 TABLET ORAL DAILY
Status: DISCONTINUED | OUTPATIENT
Start: 2021-01-01 | End: 2021-01-01 | Stop reason: HOSPADM

## 2021-01-01 RX ORDER — TAMSULOSIN HYDROCHLORIDE 0.4 MG/1
0.4 CAPSULE ORAL DAILY
Status: DISCONTINUED | OUTPATIENT
Start: 2021-01-01 | End: 2021-01-01 | Stop reason: HOSPADM

## 2021-01-01 RX ORDER — DOXYCYCLINE 100 MG/1
100 CAPSULE ORAL EVERY 12 HOURS
Status: COMPLETED | OUTPATIENT
Start: 2021-01-01 | End: 2021-01-01

## 2021-01-01 RX ORDER — DEXAMETHASONE SODIUM PHOSPHATE 100 MG/10ML
6 INJECTION INTRAMUSCULAR; INTRAVENOUS ONCE
Status: COMPLETED | OUTPATIENT
Start: 2021-01-01 | End: 2021-01-01

## 2021-01-01 RX ORDER — SODIUM CHLORIDE 0.9 % (FLUSH) 0.9 %
5-40 SYRINGE (ML) INJECTION AS NEEDED
Status: DISCONTINUED | OUTPATIENT
Start: 2021-01-01 | End: 2021-01-01 | Stop reason: HOSPADM

## 2021-01-01 RX ORDER — TRAZODONE HYDROCHLORIDE 50 MG/1
50 TABLET ORAL
Status: DISCONTINUED | OUTPATIENT
Start: 2021-01-01 | End: 2021-01-01 | Stop reason: HOSPADM

## 2021-01-01 RX ORDER — ACETAMINOPHEN 325 MG/1
650 TABLET ORAL
Status: DISCONTINUED | OUTPATIENT
Start: 2021-01-01 | End: 2021-01-01 | Stop reason: HOSPADM

## 2021-01-01 RX ORDER — ENOXAPARIN SODIUM 100 MG/ML
40 INJECTION SUBCUTANEOUS DAILY
Status: DISCONTINUED | OUTPATIENT
Start: 2021-01-01 | End: 2021-01-01 | Stop reason: HOSPADM

## 2021-01-01 RX ORDER — POLYETHYLENE GLYCOL 3350 17 G/17G
17 POWDER, FOR SOLUTION ORAL DAILY PRN
Status: DISCONTINUED | OUTPATIENT
Start: 2021-01-01 | End: 2021-01-01 | Stop reason: HOSPADM

## 2021-01-01 RX ORDER — SODIUM CHLORIDE 0.9 % (FLUSH) 0.9 %
5-40 SYRINGE (ML) INJECTION EVERY 8 HOURS
Status: DISCONTINUED | OUTPATIENT
Start: 2021-01-01 | End: 2021-01-01 | Stop reason: HOSPADM

## 2021-01-01 RX ORDER — IPRATROPIUM BROMIDE AND ALBUTEROL SULFATE 2.5; .5 MG/3ML; MG/3ML
3 SOLUTION RESPIRATORY (INHALATION)
Status: COMPLETED | OUTPATIENT
Start: 2021-01-01 | End: 2021-01-01

## 2021-01-01 RX ORDER — POTASSIUM CHLORIDE 20 MEQ/1
20 TABLET, EXTENDED RELEASE ORAL
Status: COMPLETED | OUTPATIENT
Start: 2021-01-01 | End: 2021-01-01

## 2021-01-01 RX ORDER — MAG HYDROX/ALUMINUM HYD/SIMETH 200-200-20
30 SUSPENSION, ORAL (FINAL DOSE FORM) ORAL ONCE
Status: COMPLETED | OUTPATIENT
Start: 2021-01-01 | End: 2021-01-01

## 2021-01-01 RX ORDER — DEXMEDETOMIDINE HYDROCHLORIDE 4 UG/ML
.1-1.5 INJECTION, SOLUTION INTRAVENOUS
Status: DISCONTINUED | OUTPATIENT
Start: 2021-01-01 | End: 2021-01-01 | Stop reason: HOSPADM

## 2021-01-01 RX ORDER — ACETAMINOPHEN 500 MG
1000 TABLET ORAL ONCE
Status: COMPLETED | OUTPATIENT
Start: 2021-01-01 | End: 2021-01-01

## 2021-01-01 RX ORDER — GUAIFENESIN 600 MG/1
600 TABLET, EXTENDED RELEASE ORAL EVERY 12 HOURS
Status: DISCONTINUED | OUTPATIENT
Start: 2021-01-01 | End: 2021-01-01 | Stop reason: HOSPADM

## 2021-01-01 RX ORDER — ETOMIDATE 2 MG/ML
40 INJECTION INTRAVENOUS ONCE
Status: COMPLETED | OUTPATIENT
Start: 2021-01-01 | End: 2021-01-01

## 2021-01-01 RX ORDER — FENTANYL CITRATE-0.9 % NACL/PF 25 MCG/ML
0-200 PLASTIC BAG, INJECTION (ML) INJECTION
Status: DISCONTINUED | OUTPATIENT
Start: 2021-01-01 | End: 2021-01-01 | Stop reason: HOSPADM

## 2021-01-01 RX ORDER — CLONAZEPAM 0.5 MG/1
0.5 TABLET ORAL
Status: DISCONTINUED | OUTPATIENT
Start: 2021-01-01 | End: 2021-01-01 | Stop reason: HOSPADM

## 2021-01-01 RX ORDER — DEXAMETHASONE SODIUM PHOSPHATE 100 MG/10ML
6 INJECTION INTRAMUSCULAR; INTRAVENOUS EVERY 24 HOURS
Status: DISCONTINUED | OUTPATIENT
Start: 2021-01-01 | End: 2021-01-01

## 2021-01-01 RX ORDER — DIPHENHYDRAMINE HCL 25 MG
25 CAPSULE ORAL ONCE
Status: COMPLETED | OUTPATIENT
Start: 2021-01-01 | End: 2021-01-01

## 2021-01-01 RX ORDER — ALBUTEROL SULFATE 90 UG/1
2 AEROSOL, METERED RESPIRATORY (INHALATION)
Status: DISCONTINUED | OUTPATIENT
Start: 2021-01-01 | End: 2021-01-01 | Stop reason: HOSPADM

## 2021-01-01 RX ORDER — MORPHINE SULFATE 2 MG/ML
1 INJECTION, SOLUTION INTRAMUSCULAR; INTRAVENOUS ONCE
Status: COMPLETED | OUTPATIENT
Start: 2021-01-01 | End: 2021-01-01

## 2021-01-01 RX ORDER — MAGNESIUM SULFATE HEPTAHYDRATE 40 MG/ML
2 INJECTION, SOLUTION INTRAVENOUS ONCE
Status: COMPLETED | OUTPATIENT
Start: 2021-01-01 | End: 2021-01-01

## 2021-01-01 RX ORDER — CITALOPRAM 20 MG/1
20 TABLET, FILM COATED ORAL DAILY
Status: DISCONTINUED | OUTPATIENT
Start: 2021-01-01 | End: 2021-01-01 | Stop reason: HOSPADM

## 2021-01-01 RX ORDER — ONDANSETRON 2 MG/ML
4 INJECTION INTRAMUSCULAR; INTRAVENOUS
Status: DISCONTINUED | OUTPATIENT
Start: 2021-01-01 | End: 2021-01-01 | Stop reason: HOSPADM

## 2021-01-01 RX ORDER — SODIUM CHLORIDE 0.9 % (FLUSH) 0.9 %
30 SYRINGE (ML) INJECTION AS NEEDED
Status: DISCONTINUED | OUTPATIENT
Start: 2021-01-01 | End: 2021-01-01 | Stop reason: HOSPADM

## 2021-01-01 RX ADMIN — Medication 40 ML: at 21:10

## 2021-01-01 RX ADMIN — ATORVASTATIN CALCIUM 40 MG: 40 TABLET, FILM COATED ORAL at 21:48

## 2021-01-01 RX ADMIN — Medication 10 ML: at 14:00

## 2021-01-01 RX ADMIN — BARICITINIB 4 MG: 2 TABLET, FILM COATED ORAL at 09:00

## 2021-01-01 RX ADMIN — DEXAMETHASONE SODIUM PHOSPHATE 6 MG: 10 INJECTION INTRAMUSCULAR; INTRAVENOUS at 11:00

## 2021-01-01 RX ADMIN — DOXYCYCLINE HYCLATE 100 MG: 100 CAPSULE ORAL at 09:24

## 2021-01-01 RX ADMIN — Medication 40 ML: at 21:11

## 2021-01-01 RX ADMIN — TUBERCULIN PURIFIED PROTEIN DERIVATIVE 5 UNITS: 5 INJECTION, SOLUTION INTRADERMAL at 23:27

## 2021-01-01 RX ADMIN — CEFTRIAXONE 1 G: 1 INJECTION, POWDER, FOR SOLUTION INTRAMUSCULAR; INTRAVENOUS at 21:48

## 2021-01-01 RX ADMIN — MORPHINE SULFATE 5 MG: 10 INJECTION INTRAVENOUS at 21:10

## 2021-01-01 RX ADMIN — POTASSIUM CHLORIDE: 2 INJECTION, SOLUTION, CONCENTRATE INTRAVENOUS at 17:39

## 2021-01-01 RX ADMIN — DEXMEDETOMIDINE HYDROCHLORIDE 0.5 MCG/KG/HR: 400 INJECTION INTRAVENOUS at 00:51

## 2021-01-01 RX ADMIN — Medication 10 ML: at 06:24

## 2021-01-01 RX ADMIN — MORPHINE SULFATE 5 MG: 10 INJECTION INTRAVENOUS at 16:33

## 2021-01-01 RX ADMIN — ACETAMINOPHEN 650 MG: 325 TABLET ORAL at 11:42

## 2021-01-01 RX ADMIN — TAMSULOSIN HYDROCHLORIDE 0.4 MG: 0.4 CAPSULE ORAL at 09:03

## 2021-01-01 RX ADMIN — ATORVASTATIN CALCIUM 40 MG: 40 TABLET, FILM COATED ORAL at 22:05

## 2021-01-01 RX ADMIN — TAMSULOSIN HYDROCHLORIDE 0.4 MG: 0.4 CAPSULE ORAL at 09:00

## 2021-01-01 RX ADMIN — Medication 40 ML: at 22:18

## 2021-01-01 RX ADMIN — LISINOPRIL AND HYDROCHLOROTHIAZIDE 1 TABLET: 12.5; 1 TABLET ORAL at 09:00

## 2021-01-01 RX ADMIN — MORPHINE SULFATE 1 MG: 2 INJECTION, SOLUTION INTRAMUSCULAR; INTRAVENOUS at 00:29

## 2021-01-01 RX ADMIN — ANTI-PRURITIC: 1 CREAM TOPICAL at 02:09

## 2021-01-01 RX ADMIN — GUAIFENESIN 600 MG: 600 TABLET, EXTENDED RELEASE ORAL at 21:11

## 2021-01-01 RX ADMIN — CEFTRIAXONE 1 G: 1 INJECTION, POWDER, FOR SOLUTION INTRAMUSCULAR; INTRAVENOUS at 21:10

## 2021-01-01 RX ADMIN — CEFTRIAXONE 1 G: 1 INJECTION, POWDER, FOR SOLUTION INTRAMUSCULAR; INTRAVENOUS at 21:20

## 2021-01-01 RX ADMIN — ENOXAPARIN SODIUM 40 MG: 100 INJECTION SUBCUTANEOUS at 09:32

## 2021-01-01 RX ADMIN — DEXMEDETOMIDINE HYDROCHLORIDE 0.8 MCG/KG/HR: 400 INJECTION INTRAVENOUS at 10:41

## 2021-01-01 RX ADMIN — DEXAMETHASONE SODIUM PHOSPHATE 6 MG: 10 INJECTION, SOLUTION INTRAMUSCULAR; INTRAVENOUS at 08:38

## 2021-01-01 RX ADMIN — INSULIN LISPRO 2 UNITS: 100 INJECTION, SOLUTION INTRAVENOUS; SUBCUTANEOUS at 16:30

## 2021-01-01 RX ADMIN — DEXMEDETOMIDINE HYDROCHLORIDE 0.5 MCG/KG/HR: 400 INJECTION INTRAVENOUS at 15:19

## 2021-01-01 RX ADMIN — Medication 10 ML: at 05:51

## 2021-01-01 RX ADMIN — Medication 10 ML: at 15:58

## 2021-01-01 RX ADMIN — BARICITINIB 4 MG: 2 TABLET, FILM COATED ORAL at 09:31

## 2021-01-01 RX ADMIN — BARICITINIB 4 MG: 2 TABLET, FILM COATED ORAL at 08:38

## 2021-01-01 RX ADMIN — Medication 20 MG: at 09:00

## 2021-01-01 RX ADMIN — Medication 10 ML: at 17:27

## 2021-01-01 RX ADMIN — Medication 30 ML: at 06:24

## 2021-01-01 RX ADMIN — INSULIN LISPRO 2 UNITS: 100 INJECTION, SOLUTION INTRAVENOUS; SUBCUTANEOUS at 11:21

## 2021-01-01 RX ADMIN — DOXYCYCLINE HYCLATE 100 MG: 100 CAPSULE ORAL at 21:20

## 2021-01-01 RX ADMIN — GUAIFENESIN 600 MG: 600 TABLET, EXTENDED RELEASE ORAL at 20:50

## 2021-01-01 RX ADMIN — Medication 40 ML: at 06:33

## 2021-01-01 RX ADMIN — Medication 20 MG: at 09:24

## 2021-01-01 RX ADMIN — Medication 30 ML: at 13:56

## 2021-01-01 RX ADMIN — DOXYCYCLINE HYCLATE 100 MG: 100 CAPSULE ORAL at 21:48

## 2021-01-01 RX ADMIN — DOXYCYCLINE HYCLATE 100 MG: 100 CAPSULE ORAL at 18:02

## 2021-01-01 RX ADMIN — HYDROCODONE BITARTRATE AND ACETAMINOPHEN 1 TABLET: 5; 325 TABLET ORAL at 07:50

## 2021-01-01 RX ADMIN — INSULIN LISPRO 2 UNITS: 100 INJECTION, SOLUTION INTRAVENOUS; SUBCUTANEOUS at 22:05

## 2021-01-01 RX ADMIN — Medication 10 ML: at 21:48

## 2021-01-01 RX ADMIN — VECURONIUM BROMIDE 10 MG: 10 INJECTION, POWDER, LYOPHILIZED, FOR SOLUTION INTRAVENOUS at 06:28

## 2021-01-01 RX ADMIN — PANTOPRAZOLE SODIUM 40 MG: 40 TABLET, DELAYED RELEASE ORAL at 05:51

## 2021-01-01 RX ADMIN — ENOXAPARIN SODIUM 40 MG: 100 INJECTION SUBCUTANEOUS at 08:38

## 2021-01-01 RX ADMIN — LISINOPRIL AND HYDROCHLOROTHIAZIDE 1 TABLET: 12.5; 1 TABLET ORAL at 09:24

## 2021-01-01 RX ADMIN — MORPHINE SULFATE 5 MG: 10 INJECTION INTRAVENOUS at 15:26

## 2021-01-01 RX ADMIN — GUAIFENESIN 600 MG: 600 TABLET, EXTENDED RELEASE ORAL at 22:05

## 2021-01-01 RX ADMIN — BARICITINIB 4 MG: 2 TABLET, FILM COATED ORAL at 12:05

## 2021-01-01 RX ADMIN — PANTOPRAZOLE SODIUM 40 MG: 40 INJECTION, POWDER, FOR SOLUTION INTRAVENOUS at 11:00

## 2021-01-01 RX ADMIN — Medication 20 MG: at 09:03

## 2021-01-01 RX ADMIN — TAMSULOSIN HYDROCHLORIDE 0.4 MG: 0.4 CAPSULE ORAL at 09:24

## 2021-01-01 RX ADMIN — LORAZEPAM 2 MG: 2 INJECTION INTRAMUSCULAR; INTRAVENOUS at 09:35

## 2021-01-01 RX ADMIN — Medication 20 MG: at 09:31

## 2021-01-01 RX ADMIN — ATORVASTATIN CALCIUM 40 MG: 40 TABLET, FILM COATED ORAL at 20:50

## 2021-01-01 RX ADMIN — GUAIFENESIN 600 MG: 600 TABLET, EXTENDED RELEASE ORAL at 09:31

## 2021-01-01 RX ADMIN — DEXAMETHASONE SODIUM PHOSPHATE 6 MG: 10 INJECTION, SOLUTION INTRAMUSCULAR; INTRAVENOUS at 09:24

## 2021-01-01 RX ADMIN — Medication 30 ML: at 22:14

## 2021-01-01 RX ADMIN — PHENOL 1 SPRAY: 1.5 LIQUID ORAL at 11:00

## 2021-01-01 RX ADMIN — ASPIRIN 81 MG: 81 TABLET ORAL at 21:28

## 2021-01-01 RX ADMIN — Medication 10 ML: at 21:20

## 2021-01-01 RX ADMIN — INSULIN LISPRO 2 UNITS: 100 INJECTION, SOLUTION INTRAVENOUS; SUBCUTANEOUS at 17:37

## 2021-01-01 RX ADMIN — LORAZEPAM 2 MG: 2 INJECTION INTRAMUSCULAR; INTRAVENOUS at 20:50

## 2021-01-01 RX ADMIN — Medication 30 ML: at 21:00

## 2021-01-01 RX ADMIN — PANTOPRAZOLE SODIUM 40 MG: 40 TABLET, DELAYED RELEASE ORAL at 07:30

## 2021-01-01 RX ADMIN — GUAIFENESIN 600 MG: 600 TABLET, EXTENDED RELEASE ORAL at 09:00

## 2021-01-01 RX ADMIN — GUAIFENESIN 600 MG: 600 TABLET, EXTENDED RELEASE ORAL at 21:10

## 2021-01-01 RX ADMIN — Medication 20 ML: at 21:00

## 2021-01-01 RX ADMIN — INSULIN LISPRO 2 UNITS: 100 INJECTION, SOLUTION INTRAVENOUS; SUBCUTANEOUS at 00:13

## 2021-01-01 RX ADMIN — POTASSIUM CHLORIDE: 2 INJECTION, SOLUTION, CONCENTRATE INTRAVENOUS at 17:28

## 2021-01-01 RX ADMIN — PANTOPRAZOLE SODIUM 40 MG: 40 TABLET, DELAYED RELEASE ORAL at 05:26

## 2021-01-01 RX ADMIN — ASPIRIN 81 MG: 81 TABLET ORAL at 21:20

## 2021-01-01 RX ADMIN — DOXYCYCLINE HYCLATE 100 MG: 100 CAPSULE ORAL at 20:10

## 2021-01-01 RX ADMIN — DEXMEDETOMIDINE HYDROCHLORIDE 0.8 MCG/KG/HR: 400 INJECTION INTRAVENOUS at 06:06

## 2021-01-01 RX ADMIN — GUAIFENESIN 600 MG: 600 TABLET, EXTENDED RELEASE ORAL at 15:58

## 2021-01-01 RX ADMIN — ACETAMINOPHEN 650 MG: 325 TABLET ORAL at 21:21

## 2021-01-01 RX ADMIN — POTASSIUM CHLORIDE 20 MEQ: 20 TABLET, EXTENDED RELEASE ORAL at 20:33

## 2021-01-01 RX ADMIN — Medication 40 ML: at 14:34

## 2021-01-01 RX ADMIN — DEXAMETHASONE SODIUM PHOSPHATE 6 MG: 10 INJECTION INTRAMUSCULAR; INTRAVENOUS at 08:02

## 2021-01-01 RX ADMIN — TAMSULOSIN HYDROCHLORIDE 0.4 MG: 0.4 CAPSULE ORAL at 09:31

## 2021-01-01 RX ADMIN — DEXAMETHASONE SODIUM PHOSPHATE 6 MG: 10 INJECTION INTRAMUSCULAR; INTRAVENOUS at 16:07

## 2021-01-01 RX ADMIN — LORAZEPAM 2 MG: 2 INJECTION INTRAMUSCULAR; INTRAVENOUS at 13:55

## 2021-01-01 RX ADMIN — LORAZEPAM 2 MG: 2 INJECTION INTRAMUSCULAR; INTRAVENOUS at 05:47

## 2021-01-01 RX ADMIN — POTASSIUM PHOSPHATE, MONOBASIC AND POTASSIUM PHOSPHATE, DIBASIC: 224; 236 INJECTION, SOLUTION, CONCENTRATE INTRAVENOUS at 09:00

## 2021-01-01 RX ADMIN — PANTOPRAZOLE SODIUM 40 MG: 40 TABLET, DELAYED RELEASE ORAL at 05:29

## 2021-01-01 RX ADMIN — Medication 40 ML: at 06:00

## 2021-01-01 RX ADMIN — DOXYCYCLINE HYCLATE 100 MG: 100 CAPSULE ORAL at 08:38

## 2021-01-01 RX ADMIN — DEXAMETHASONE 6 MG: 4 TABLET ORAL at 09:00

## 2021-01-01 RX ADMIN — Medication 10 ML: at 21:04

## 2021-01-01 RX ADMIN — ATORVASTATIN CALCIUM 40 MG: 40 TABLET, FILM COATED ORAL at 21:10

## 2021-01-01 RX ADMIN — CEFTRIAXONE 1 G: 1 INJECTION, POWDER, FOR SOLUTION INTRAMUSCULAR; INTRAVENOUS at 20:10

## 2021-01-01 RX ADMIN — ETOMIDATE 40 MG: 40 INJECTION, SOLUTION INTRAVENOUS at 06:28

## 2021-01-01 RX ADMIN — TRAZODONE HYDROCHLORIDE 50 MG: 50 TABLET ORAL at 21:10

## 2021-01-01 RX ADMIN — Medication 10 ML: at 05:04

## 2021-01-01 RX ADMIN — Medication 10 ML: at 05:41

## 2021-01-01 RX ADMIN — ATORVASTATIN CALCIUM 40 MG: 40 TABLET, FILM COATED ORAL at 20:10

## 2021-01-01 RX ADMIN — ENOXAPARIN SODIUM 40 MG: 100 INJECTION SUBCUTANEOUS at 09:01

## 2021-01-01 RX ADMIN — ATORVASTATIN CALCIUM 40 MG: 40 TABLET, FILM COATED ORAL at 20:33

## 2021-01-01 RX ADMIN — TAMSULOSIN HYDROCHLORIDE 0.4 MG: 0.4 CAPSULE ORAL at 08:38

## 2021-01-01 RX ADMIN — FENTANYL CITRATE 100 MCG: 50 INJECTION INTRAMUSCULAR; INTRAVENOUS at 06:27

## 2021-01-01 RX ADMIN — CLONAZEPAM 0.5 MG: 0.5 TABLET ORAL at 20:04

## 2021-01-01 RX ADMIN — INSULIN LISPRO 2 UNITS: 100 INJECTION, SOLUTION INTRAVENOUS; SUBCUTANEOUS at 17:05

## 2021-01-01 RX ADMIN — Medication 40 ML: at 06:07

## 2021-01-01 RX ADMIN — SODIUM CHLORIDE 3267 ML: 900 INJECTION, SOLUTION INTRAVENOUS at 14:10

## 2021-01-01 RX ADMIN — INSULIN LISPRO 2 UNITS: 100 INJECTION, SOLUTION INTRAVENOUS; SUBCUTANEOUS at 21:28

## 2021-01-01 RX ADMIN — DEXAMETHASONE SODIUM PHOSPHATE 6 MG: 10 INJECTION, SOLUTION INTRAMUSCULAR; INTRAVENOUS at 09:31

## 2021-01-01 RX ADMIN — Medication 10 ML: at 16:18

## 2021-01-01 RX ADMIN — NOREPINEPHRINE BITARTRATE 30 MCG/MIN: 1 INJECTION, SOLUTION, CONCENTRATE INTRAVENOUS at 06:31

## 2021-01-01 RX ADMIN — PANTOPRAZOLE SODIUM 40 MG: 40 TABLET, DELAYED RELEASE ORAL at 05:40

## 2021-01-01 RX ADMIN — ASPIRIN 81 MG: 81 TABLET ORAL at 21:04

## 2021-01-01 RX ADMIN — MORPHINE SULFATE 5 MG: 10 INJECTION INTRAVENOUS at 01:19

## 2021-01-01 RX ADMIN — MAGNESIUM SULFATE HEPTAHYDRATE 2 G: 40 INJECTION, SOLUTION INTRAVENOUS at 09:00

## 2021-01-01 RX ADMIN — ANTI-PRURITIC: 1 CREAM TOPICAL at 21:43

## 2021-01-01 RX ADMIN — MORPHINE SULFATE 5 MG: 10 INJECTION INTRAVENOUS at 06:00

## 2021-01-01 RX ADMIN — GUAIFENESIN 600 MG: 600 TABLET, EXTENDED RELEASE ORAL at 21:48

## 2021-01-01 RX ADMIN — GUAIFENESIN 600 MG: 600 TABLET, EXTENDED RELEASE ORAL at 21:20

## 2021-01-01 RX ADMIN — ALUMINUM HYDROXIDE, MAGNESIUM HYDROXIDE, AND SIMETHICONE 30 ML: 200; 200; 20 SUSPENSION ORAL at 22:35

## 2021-01-01 RX ADMIN — CEFTRIAXONE 1 G: 1 INJECTION, POWDER, FOR SOLUTION INTRAMUSCULAR; INTRAVENOUS at 20:33

## 2021-01-01 RX ADMIN — Medication 20 MG: at 08:38

## 2021-01-01 RX ADMIN — Medication 30 ML: at 17:27

## 2021-01-01 RX ADMIN — Medication 40 ML: at 22:00

## 2021-01-01 RX ADMIN — MORPHINE SULFATE 5 MG: 10 INJECTION INTRAVENOUS at 20:04

## 2021-01-01 RX ADMIN — INSULIN LISPRO 2 UNITS: 100 INJECTION, SOLUTION INTRAVENOUS; SUBCUTANEOUS at 17:25

## 2021-01-01 RX ADMIN — DOXYCYCLINE HYCLATE 100 MG: 100 CAPSULE ORAL at 21:11

## 2021-01-01 RX ADMIN — Medication 10 ML: at 05:10

## 2021-01-01 RX ADMIN — INSULIN LISPRO 2 UNITS: 100 INJECTION, SOLUTION INTRAVENOUS; SUBCUTANEOUS at 22:57

## 2021-01-01 RX ADMIN — Medication 30 ML: at 06:07

## 2021-01-01 RX ADMIN — INSULIN LISPRO 2 UNITS: 100 INJECTION, SOLUTION INTRAVENOUS; SUBCUTANEOUS at 16:39

## 2021-01-01 RX ADMIN — Medication 10 ML: at 21:09

## 2021-01-01 RX ADMIN — BARICITINIB 4 MG: 2 TABLET, FILM COATED ORAL at 09:24

## 2021-01-01 RX ADMIN — DOXYCYCLINE HYCLATE 100 MG: 100 CAPSULE ORAL at 09:31

## 2021-01-01 RX ADMIN — LORAZEPAM 2 MG: 2 INJECTION INTRAMUSCULAR; INTRAVENOUS at 22:14

## 2021-01-01 RX ADMIN — HYDROCODONE BITARTRATE AND ACETAMINOPHEN 1 TABLET: 5; 325 TABLET ORAL at 11:52

## 2021-01-01 RX ADMIN — DEXAMETHASONE SODIUM PHOSPHATE 6 MG: 10 INJECTION, SOLUTION INTRAMUSCULAR; INTRAVENOUS at 09:04

## 2021-01-01 RX ADMIN — PANTOPRAZOLE SODIUM 40 MG: 40 INJECTION, POWDER, FOR SOLUTION INTRAVENOUS at 08:02

## 2021-01-01 RX ADMIN — IPRATROPIUM BROMIDE AND ALBUTEROL SULFATE 3 ML: .5; 3 SOLUTION RESPIRATORY (INHALATION) at 15:45

## 2021-01-01 RX ADMIN — DOXYCYCLINE HYCLATE 100 MG: 100 CAPSULE ORAL at 09:04

## 2021-01-01 RX ADMIN — ACETAMINOPHEN 1000 MG: 500 TABLET, FILM COATED ORAL at 16:23

## 2021-01-01 RX ADMIN — MORPHINE SULFATE 5 MG: 10 INJECTION INTRAVENOUS at 10:55

## 2021-01-01 RX ADMIN — Medication 10 ML: at 13:35

## 2021-01-01 RX ADMIN — FENTANYL CITRATE 100 MCG: 50 INJECTION INTRAMUSCULAR; INTRAVENOUS at 06:28

## 2021-01-01 RX ADMIN — DEXMEDETOMIDINE HYDROCHLORIDE 0.4 MCG/KG/HR: 400 INJECTION INTRAVENOUS at 00:48

## 2021-01-01 RX ADMIN — DIPHENHYDRAMINE HYDROCHLORIDE 25 MG: 25 CAPSULE ORAL at 23:00

## 2021-01-01 RX ADMIN — ATORVASTATIN CALCIUM 40 MG: 40 TABLET, FILM COATED ORAL at 21:20

## 2021-01-01 RX ADMIN — DOXYCYCLINE HYCLATE 100 MG: 100 CAPSULE ORAL at 09:00

## 2021-01-01 RX ADMIN — GUAIFENESIN 600 MG: 600 TABLET, EXTENDED RELEASE ORAL at 09:24

## 2021-01-01 RX ADMIN — SOYBEAN OIL 250 ML: 20 INJECTION, SOLUTION INTRAVENOUS at 17:39

## 2021-01-01 RX ADMIN — Medication 10 ML: at 13:56

## 2021-11-29 PROBLEM — J12.82 PNEUMONIA DUE TO COVID-19 VIRUS: Status: ACTIVE | Noted: 2021-01-01

## 2021-11-29 PROBLEM — Z78.9 DNI (DO NOT INTUBATE): Status: ACTIVE | Noted: 2021-01-01

## 2021-11-29 PROBLEM — N17.9 AKI (ACUTE KIDNEY INJURY) (HCC): Status: ACTIVE | Noted: 2021-01-01

## 2021-11-29 PROBLEM — R73.9 HYPERGLYCEMIA: Status: ACTIVE | Noted: 2021-01-01

## 2021-11-29 PROBLEM — J96.01 ACUTE RESPIRATORY FAILURE WITH HYPOXIA (HCC): Status: ACTIVE | Noted: 2021-01-01

## 2021-11-29 PROBLEM — E87.6 HYPOKALEMIA: Status: ACTIVE | Noted: 2021-01-01

## 2021-11-29 PROBLEM — U07.1 PNEUMONIA DUE TO COVID-19 VIRUS: Status: ACTIVE | Noted: 2021-01-01

## 2021-11-29 PROBLEM — E66.9 OBESITY (BMI 30-39.9): Chronic | Status: ACTIVE | Noted: 2021-01-01

## 2021-11-29 NOTE — ED NOTES
Respiratory notified of breathing treatment. Patient able to speak in complete sentences.   Patient gave urine sample

## 2021-11-29 NOTE — ED TRIAGE NOTES
Pt ambulatory to triage, states he is here for Covid Antibodies. Pt's symptoms started Nov 23rd, tested positive Nov 27th. Pts ysmptoms include headache, fever, thoracic back pain, productive cough, diarrhea, shob on exertion. Pt called his PCP on the 26th and explained he wasn't feeling well, they went ahead and just called in 875mg Amoxicillin for him. Pt denies chest pain, nausea, vomiting. Pt's ambulatory O2 sat is 85%, placed on 4L/NC up to 93%.

## 2021-11-29 NOTE — PROGRESS NOTES
TRANSFER - IN REPORT:    Verbal report received from humera(name) on Frank Balderas  being received from er(unit) for routine progression of care      Report consisted of patients Situation, Background, Assessment and   Recommendations(SBAR). Information from the following report(s) ED Summary was reviewed with the receiving nurse. Opportunity for questions and clarification was provided. Assessment completed upon patients arrival to unit and care assumed. Awaiting arrival report to waqas ortiz

## 2021-11-29 NOTE — ED PROVIDER NOTES
41-year-old male presenting for worsening fatigue cough and shortness of breath in the setting of COVID-19 infection. Symptoms started about 6 days ago  They have been progressively worsening  Patient's also had some decreased appetite, nausea and vomiting with diarrhea  Became increasingly fatigued decided to come here for \"those antibodies\". Upon arrival the patient was found to be hypotensive and hypoxic  Denies any other symptoms at this time such as chest pain, nausea, vomiting, diarrhea. He has not been vaccinated    The history is provided by the patient.    Positive For Covid-19  Temp source:  Oral  Severity:  Moderate  Onset quality:  Gradual  Duration:  6 days  Timing:  Constant  Progression:  Worsening  Chronicity:  New  Relieved by:  Nothing  Worsened by:  Exertion  Ineffective treatments:  None tried  Associated symptoms: congestion, diarrhea, myalgias, nausea and vomiting    Risk factors: sick contacts         Past Medical History:   Diagnosis Date    CAD (coronary artery disease) 12/19/2012    ED (erectile dysfunction) 12/19/2012    GERD (gastroesophageal reflux disease) 12/19/2012    managed by meds    HLD (hyperlipidemia) 12/19/2012    HTN (hypertension) 12/19/2012    managed by meds    Psychiatric disorder     managed by meds       Past Surgical History:   Procedure Laterality Date    HX COLONOSCOPY      and EGD    HX ENDOSCOPY      Biliary obstruction    HX HEART CATHETERIZATION      HX HERNIA REPAIR      umbilical         Family History:   Problem Relation Age of Onset    Hypertension Mother     Cancer Father         Skin    Diabetes Other         Cousin       Social History     Socioeconomic History    Marital status:      Spouse name: Not on file    Number of children: Not on file    Years of education: Not on file    Highest education level: Not on file   Occupational History    Not on file   Tobacco Use    Smoking status: Never Smoker    Smokeless tobacco: Never Used   Vaping Use    Vaping Use: Never used   Substance and Sexual Activity    Alcohol use: No    Drug use: No    Sexual activity: Not on file   Other Topics Concern    Not on file   Social History Narrative    Not on file     Social Determinants of Health     Financial Resource Strain:     Difficulty of Paying Living Expenses: Not on file   Food Insecurity:     Worried About Running Out of Food in the Last Year: Not on file    Yvonne of Food in the Last Year: Not on file   Transportation Needs:     Lack of Transportation (Medical): Not on file    Lack of Transportation (Non-Medical): Not on file   Physical Activity:     Days of Exercise per Week: Not on file    Minutes of Exercise per Session: Not on file   Stress:     Feeling of Stress : Not on file   Social Connections:     Frequency of Communication with Friends and Family: Not on file    Frequency of Social Gatherings with Friends and Family: Not on file    Attends Sikh Services: Not on file    Active Member of [address] [place] [address] or Organizations: Not on file    Attends Club or Organization Meetings: Not on file    Marital Status: Not on file   Intimate Partner Violence:     Fear of Current or Ex-Partner: Not on file    Emotionally Abused: Not on file    Physically Abused: Not on file    Sexually Abused: Not on file   Housing Stability:     Unable to Pay for Housing in the Last Year: Not on file    Number of [place] in the Last Year: Not on file    Unstable Housing in the Last Year: Not on file         ALLERGIES: Patient has no known allergies. Review of Systems   HENT: Positive for congestion. Gastrointestinal: Positive for diarrhea, nausea and vomiting. Musculoskeletal: Positive for myalgias. All other systems reviewed and are negative.       Vitals:    11/29/21 1400 11/29/21 1508 11/29/21 1517 11/29/21 1518   BP: 96/64 (!) 122/59     Pulse:       Resp:       Temp:       SpO2: 93%  95% 96%   Weight:       Height: Physical Exam  Vitals and nursing note reviewed. Constitutional:       Appearance: He is well-developed. HENT:      Head: Normocephalic and atraumatic. Eyes:      Conjunctiva/sclera: Conjunctivae normal.      Pupils: Pupils are equal, round, and reactive to light. Cardiovascular:      Rate and Rhythm: Normal rate and regular rhythm. Heart sounds: Normal heart sounds. Pulmonary:      Effort: Pulmonary effort is normal.      Breath sounds: Wheezing present. Abdominal:      General: Bowel sounds are normal.      Palpations: Abdomen is soft. Musculoskeletal:         General: No deformity. Normal range of motion. Cervical back: Normal range of motion and neck supple. Skin:     General: Skin is warm and dry. Neurological:      Mental Status: He is alert and oriented to person, place, and time. Cranial Nerves: No cranial nerve deficit. Psychiatric:         Behavior: Behavior normal.          MDM  Number of Diagnoses or Management Options  Acute respiratory failure with hypoxia ([address]  Pneumonia due to COVID-19 virus  Diagnosis management comments: 59-year-old male presenting for shortness of breath, weakness in the setting of COVID-19. Patient was hypotensive and hypoxic upon arrival.  Upon my evaluation he has been given some fluids and on supplemental oxygen looks quite well. Only mildly wheezing on exam.  Patient's hypoxia may have been secondary to low blood pressure from nausea vomiting and poor appetite. He is gotten fluids and responded nicely. Appears clinically well. We will give the patient a breathing treatment and then have him ambulate with oximetry. If continues to desaturate he will be given steroids and admitted to the hospital.  He stabilizes he may still qualify for antibodies and can try to go home as I think his hypoxia may have been secondary to a poor reading given his low blood pressure and poor perfusion of his fingers.        Amount and/or Complexity of Data Reviewed  Clinical lab tests: ordered and reviewed (Results for orders placed or performed during the hospital encounter of 11/29/21  -CULTURE, BLOOD:   Specimen: Blood       Result                      Value             Ref Range           Special Requests:           LEFT HAND                             Culture result:             PENDING                          -LACTIC ACID:        Result                      Value             Ref Range           Lactic acid                 1.8               0.4 - 2.0 MM*  -CBC WITH AUTOMATED DIFF:        Result                      Value             Ref Range           WBC                         5.4               4.3 - 11.1 K*       RBC                         5.57              4.23 - 5.6 M*       HGB                         15.8              13.6 - 17.2 *       HCT                         47.8              41.1 - 50.3 %       MCV                         85.8              79.6 - 97.8 *       MCH                         28.4              26.1 - 32.9 *       MCHC                        33.1              31.4 - 35.0 *       RDW                         14.3              11.9 - 14.6 %       PLATELET                    151               150 - 450 K/*       MPV                         9.7               9.4 - 12.3 FL       ABSOLUTE NRBC               0.00              0.0 - 0.2 K/*       DF                          AUTOMATED                             NEUTROPHILS                 74                43 - 78 %           LYMPHOCYTES                 16                13 - 44 %           MONOCYTES                   6                 4.0 - 12.0 %        EOSINOPHILS                 4                 0.5 - 7.8 %         BASOPHILS                   0                 0.0 - 2.0 %         IMMATURE GRANULOCYTES       1                 0.0 - 5.0 %         ABS. NEUTROPHILS            4.0               1.7 - 8.2 K/*       ABS. LYMPHOCYTES            0.8               0.5 - 4.6 K/*       ABS. MONOCYTES              0.4               0.1 - 1.3 K/*       ABS. EOSINOPHILS            0.2               0.0 - 0.8 K/*       ABS. BASOPHILS              0.0               0.0 - 0.2 K/*       ABS. IMM. GRANS.            0.0               0.0 - 0.5 K/*  -METABOLIC PANEL, COMPREHENSIVE:        Result                      Value             Ref Range           Sodium                      133 (L)           138 - 145 mm*       Potassium                   3.3 (L)           3.5 - 5.1 mm*       Chloride                    98                98 - 107 mmo*       CO2                         28                21 - 32 mmol*       Anion gap                   7                 7 - 16 mmol/L       Glucose                     154 (H)           65 - 100 mg/*       BUN                         23                8 - 23 MG/DL        Creatinine                  1.69 (H)          0.8 - 1.5 MG*       GFR est AA                  52 (L)            >60 ml/min/1*       GFR est non-AA              43 (L)            >60 ml/min/1*       Calcium                     8.8               8.3 - 10.4 M*       Bilirubin, total            0.6               0.2 - 1.1 MG*       ALT (SGPT)                  49                12 - 65 U/L         AST (SGOT)                  41 (H)            15 - 37 U/L         Alk.  phosphatase            72                50 - 136 U/L        Protein, total              7.3               6.3 - 8.2 g/*       Albumin                     3.2               3.2 - 4.6 g/*       Globulin                    4.1 (H)           2.3 - 3.5 g/*       A-G Ratio                   0.8 (L)           1.2 - 3.5      -PROCALCITONIN:        Result                      Value             Ref Range           Procalcitonin               <0.05             0.00 - 0.49 *  -CRP, HIGH SENSITIVITY:        Result                      Value             Ref Range           CRP, High sensitivity       61.1              mg/L           -EKG, 12 LEAD, INITIAL: Result                      Value             Ref Range           Ventricular Rate            83                BPM                 Atrial Rate                 83                BPM                 P-R Interval                148               ms                  QRS Duration                106               ms                  Q-T Interval                350               ms                  QTC Calculation (Bezet)     411               ms                  Calculated P Axis           56                degrees             Calculated R Axis           -63               degrees             Calculated T Axis           55                degrees             Diagnosis                                                     Normal sinus rhythm   Left anterior fascicular block   Possible Anterior infarct , age undetermined   Abnormal ECG   No previous ECGs available     )  Tests in the radiology section of CPT®: ordered and reviewed  Tests in the medicine section of CPT®: ordered and reviewed  Decide to obtain previous medical records or to obtain history from someone other than the patient: yes  Discuss the patient with other providers: yes  Independent visualization of images, tracings, or specimens: yes    Risk of Complications, Morbidity, and/or Mortality  Presenting problems: high  Diagnostic procedures: high  Management options: high  General comments: Patient hypoxic when supplemental oxygen removed. CRP greater than 60. Started Decadron. Consulted hospitalist for further treatment. I personally reviewed the patient's vital signs, laboratory tests, and/or radiological findings. I discussed these findings with the patient and their significance. I answered all questions and explained that given these findings there is significant concern for increased morbidity and/or mortality without immediate intervention.   As a result, I recommended admission to the hospital, consulted the appropriate service, and transitioned care to that service in improved condition      Patient Progress  Patient progress: improved         Procedures

## 2021-11-29 NOTE — H&P
Hospitalist History and Physical   Admit Date:  2021  3:01 PM   Name:  Jannet Meigs   Age:  69 y.o. Sex:  male  :  1950   MRN:  [id number]   Room:  [place] [address]    Presenting Complaint: Positive For Covid-19    Reason(s) for Admission: Acute respiratory failure with hypoxia ([facility] [address] [J96.01]     History of Present Illness:       Jannet Meigs is a 69 y.o. male with medical history of  No COVID vaccines, HTN, CAD, obesity who is evaluated with known COVID testing positive at [facility]. I personally reviewed his positive result on his cell phone. Taking oral amoxil without improvement. Found with O2 sat 85 RA and improved on 4 L NC. CXR shows left lung opacities. He does have some LEONCIO and was hypotensive on arrival and he received 3L NS bolus with improved BP ranges. Admits to back pain, no edema, some fever, no chest pain, eating ok, little diarrhea, no change taste or smell, no confusion, eating ok , no focal weakness, is short of breath     Review of Systems:  10 systems reviewed and negative except as noted in HPI. Assessment & Plan:      Active Problems:    Acute respiratory failure with hypoxia (HCC) (2021)   COVID 19 pneumonia  · Admit to remote tele   · O2 to wean as tolerant, on 4 LNC   · High sensitivity CRP was 61.1, pending routine CRP  · He agrees to baricitinib if qualifies with risks explained   · Needs trend of creatinine and liver testing   · Hold further IVF  · Decadron D 1/10  · D1/5 rocephin and doxycycline       LEONCIO:  · Recheck BMP post IVF bolus   · Holding lisinopril-hctz      Hypotension:  · Resolved  · Holding antihypertensives       CAD:  · Resume asa, lipitor      Depression:  · Resume celexa and klonopin as needed      BPH:  · Resume flomax     Hypokalemia:  · Replace and repeat lab      Hyperglycemia:  · Check A1C  · Sliding scale insulin as needed      Dispo/Discharge Planning:    pending     Diet: No diet orders on file  VTE ppx: lovenox  Code status: he confirms do not intubate but accepts CPR and [place] as of 11/29/2021 Date Reviewed: 3/3/2021          Codes Class Noted - Resolved POA    Acute respiratory failure with hypoxia [facility]) ICD-10-CM: J96.01  ICD-9-CM: 518.81  11/29/2021 - Present Unknown              Past History:  Past Medical History:   Diagnosis Date    CAD (coronary artery disease) 12/19/2012    ED (erectile dysfunction) 12/19/2012    GERD (gastroesophageal reflux disease) 12/19/2012    managed by meds    HLD (hyperlipidemia) 12/19/2012    HTN (hypertension) 12/19/2012    managed by meds    Psychiatric disorder     managed by meds     Past Surgical History:   Procedure Laterality Date    HX COLONOSCOPY      and EGD    HX ENDOSCOPY      Biliary obstruction    HX HEART CATHETERIZATION      HX HERNIA REPAIR      umbilical      No Known Allergies   Social History     Tobacco Use    Smoking status: Never Smoker    Smokeless tobacco: Never Used   Substance Use Topics    Alcohol use: No      Family History   Problem Relation Age of Onset    Hypertension Mother     Cancer Father         Skin    Diabetes Other         Cousin      Family history reviewed and negative except as otherwise noted.     Immunization History   Administered Date(s) Administered    Influenza High Dose Vaccine PF 09/10/2019, 09/17/2020, 10/24/2021    Influenza Vaccine 12/22/2016    Influenza Vaccine (Quad) PF (>6 Mo Flulaval, Fluarix, and >3 Yrs Afluria, Fluzone 04256) 09/20/2018    Pneumococcal Conjugate (PCV-13) 06/14/2017    Pneumococcal Polysaccharide (PPSV-23) 04/26/2019    Td, Adsorbed 03/13/2020     Prior to Admit Medications:  Current Outpatient Medications   Medication Instructions    amoxicillin (AMOXIL) 875 mg, Oral, 2 TIMES DAILY    aspirin delayed-release 81 mg, Oral, EVERY BEDTIME    atorvastatin (LIPITOR) 40 mg, Oral, DAILY    citalopram (CELEXA) 20 mg, Oral, DAILY    clonazePAM (KLONOPIN) 1 mg, Oral, 3 TIMES DAILY    cyclobenzaprine (FLEXERIL) 10 mg, Oral, 3 TIMES DAILY AS NEEDED    lisinopril-hydroCHLOROthiazide (PRINZIDE, ZESTORETIC) 10-12.5 mg per tablet 1 Tablet, Oral, DAILY    omega-3 acid ethyl esters (LOVAZA) 2,000 mg, Oral, 2 TIMES DAILY WITH MEALS    omeprazole (PRILOSEC) 40 mg, Oral, DAILY    tamsulosin (FLOMAX) 0.4 mg, Oral, DAILY       Objective:     Patient Vitals for the past 24 hrs:   Temp Pulse Resp BP SpO2   11/29/21 1757     91 %   11/29/21 1755     93 %   11/29/21 1748  80 22 (!) 102/57 92 %   11/29/21 1727  82 20 (!) 110/56 92 %   11/29/21 1721     90 %   11/29/21 1708    111/62 90 %   11/29/21 1648    (!) 115/57 91 %   11/29/21 1627    138/63 92 %   11/29/21 1612     (!) 89 %   11/29/21 1550     97 %   11/29/21 1548    (!) 117/58 95 %   11/29/21 1541     96 %   11/29/21 1528    105/75 95 %   11/29/21 1525     97 %   11/29/21 1518     96 %   11/29/21 1517     95 %   11/29/21 1508    (!) 122/59    11/29/21 1400    96/64 93 %   11/29/21 1355 98.9 °F (37.2 °C) 79 25 (!) 78/56 (!) 85 %     Oxygen Therapy  O2 Sat (%): 91 % (11/29/21 1757)  Pulse via Oximetry: 74 beats per minute (11/29/21 1757)  O2 Device: Nasal cannula (11/29/21 1748)  O2 Flow Rate (L/min): 4 l/min (11/29/21 1748)    Estimated body mass index is 32.55 kg/m² as calculated from the following:    Height as of this encounter: 6' (1.829 m). Weight as of this encounter: 108.9 kg (240 lb). No intake or output data in the 24 hours ending 11/29/21 1828      Physical Exam:    Blood pressure (!) 102/57, pulse 80, temperature 98.9 °F (37.2 °C), resp. rate 22, height 6' (1.829 m), weight 108.9 kg (240 lb), SpO2 91 %. General:    Well nourished. No overt distress  Head:  Normocephalic, atraumatic  Eyes:  Sclerae appear normal.  Pupils equally round. ENT:  Nares appear normal, no drainage. Moist oral mucosa  Neck:  No restricted ROM. Trachea midline   CV:   RRR. No m/r/g.   No jugular venous distension. No edema   Lungs:   CTAB. No wheezing, rhonchi, or rales. Respirations even, unlabored  Abdomen: Bowel sounds present. Soft, nontender, nondistended. Extremities: No cyanosis or clubbing. No edema  Skin:     No rashes and normal coloration. Warm and dry. Neuro:  grossly intact. Psych:  Normal mood and affect. I have reviewed ordered lab tests and independently visualized imaging below:    Last 24hr Labs:  Recent Results (from the past 24 hour(s))   EKG, 12 LEAD, INITIAL    Collection Time: 11/29/21  2:02 PM   Result Value Ref Range    Ventricular Rate 83 BPM    Atrial Rate 83 BPM    P-R Interval 148 ms    QRS Duration 106 ms    Q-T Interval 350 ms    QTC Calculation (Bezet) 411 ms    Calculated P Axis 56 degrees    Calculated R Axis -63 degrees    Calculated T Axis 55 degrees    Diagnosis       Normal sinus rhythm  Left anterior fascicular block  Possible Anterior infarct , age undetermined  Abnormal ECG  No previous ECGs available  Confirmed by Yesica Bourne MD (), MEDARDO DEMPSEY ([id number]) on 11/29/2021 5:01:26 PM     LACTIC ACID    Collection Time: 11/29/21  2:04 PM   Result Value Ref Range    Lactic acid 1.8 0.4 - 2.0 MMOL/L   CBC WITH AUTOMATED DIFF    Collection Time: 11/29/21  2:04 PM   Result Value Ref Range    WBC 5.4 4.3 - 11.1 K/uL    RBC 5.57 4.23 - 5.6 M/uL    HGB 15.8 13.6 - 17.2 g/dL    HCT 47.8 41.1 - 50.3 %    MCV 85.8 79.6 - 97.8 FL    MCH 28.4 26.1 - 32.9 PG    MCHC 33.1 31.4 - 35.0 g/dL    RDW 14.3 11.9 - 14.6 %    PLATELET 852 630 - 153 K/uL    MPV 9.7 9.4 - 12.3 FL    ABSOLUTE NRBC 0.00 0.0 - 0.2 K/uL    DF AUTOMATED      NEUTROPHILS 74 43 - 78 %    LYMPHOCYTES 16 13 - 44 %    MONOCYTES 6 4.0 - 12.0 %    EOSINOPHILS 4 0.5 - 7.8 %    BASOPHILS 0 0.0 - 2.0 %    IMMATURE GRANULOCYTES 1 0.0 - 5.0 %    ABS. NEUTROPHILS 4.0 1.7 - 8.2 K/UL    ABS. LYMPHOCYTES 0.8 0.5 - 4.6 K/UL    ABS. MONOCYTES 0.4 0.1 - 1.3 K/UL    ABS. EOSINOPHILS 0.2 0.0 - 0.8 K/UL    ABS.  BASOPHILS 0.0 0.0 - 0.2 K/UL    ABS. IMM. GRANS. 0.0 0.0 - 0.5 K/UL   METABOLIC PANEL, COMPREHENSIVE    Collection Time: 11/29/21  2:04 PM   Result Value Ref Range    Sodium 133 (L) 138 - 145 mmol/L    Potassium 3.3 (L) 3.5 - 5.1 mmol/L    Chloride 98 98 - 107 mmol/L    CO2 28 21 - 32 mmol/L    Anion gap 7 7 - 16 mmol/L    Glucose 154 (H) 65 - 100 mg/dL    BUN 23 8 - 23 MG/DL    Creatinine 1.69 (H) 0.8 - 1.5 MG/DL    GFR est AA 52 (L) >60 ml/min/1.73m2    GFR est non-AA 43 (L) >60 ml/min/1.73m2    Calcium 8.8 8.3 - 10.4 MG/DL    Bilirubin, total 0.6 0.2 - 1.1 MG/DL    ALT (SGPT) 49 12 - 65 U/L    AST (SGOT) 41 (H) 15 - 37 U/L    Alk. phosphatase 72 50 - 136 U/L    Protein, total 7.3 6.3 - 8.2 g/dL    Albumin 3.2 3.2 - 4.6 g/dL    Globulin 4.1 (H) 2.3 - 3.5 g/dL    A-G Ratio 0.8 (L) 1.2 - 3.5     PROCALCITONIN    Collection Time: 11/29/21  2:04 PM   Result Value Ref Range    Procalcitonin <0.05 0.00 - 0.49 ng/mL   CRP, HIGH SENSITIVITY    Collection Time: 11/29/21  2:04 PM   Result Value Ref Range    CRP, High sensitivity 61.1 mg/L   CULTURE, BLOOD    Collection Time: 11/29/21  2:08 PM    Specimen: Blood   Result Value Ref Range    Special Requests: LEFT  HAND        Culture result: PENDING        All Micro Results     Procedure Component Value Units Date/Time    BLOOD CULTURE [[id number]] Collected: 11/29/21 1408    Order Status: Completed Specimen: Blood Updated: 11/29/21 1414     Special Requests: --        LEFT  HAND       Culture result: PENDING    BLOOD CULTURE [[id number]]     Order Status: Sent Specimen: Blood           Other Studies:  XR CHEST PORT    Result Date: 11/29/2021  EXAM: XR CHEST PORT INDICATION: meets SIRS criteria COMPARISON: None FINDINGS: The cardiomediastinal silhouette is within normal limits. No pleural effusion or pneumothorax. There are hazy airspace opacities in the left midlung. Lungs otherwise clear. Osseous structures grossly intact.      Hazy opacities in the left midlung worrisome for pneumonia in the correct clinical setting. Medications Administered     albuterol-ipratropium (DUO-NEB) 2.5 MG-0.5 MG/3 ML     Admin Date  11/29/2021 Action  Given Dose  3 mL Route  Nebulization Administered By  RT Evan          dexamethasone (DECADRON) 10 mg/mL injection 6 mg     Admin Date  11/29/2021 Action  Given Dose  6 mg Route  IntraVENous Administered By  Gloria Choudhury RN          doxycycline (VIBRAMYCIN) capsule 100 mg     Admin Date  11/29/2021 Action  Given Dose  100 mg Route  Oral Administered By  [employer], RN          sodium chloride 0.9 % bolus infusion 3,267 mL     Admin Date  11/29/2021 Action  New Bag Dose  3,267 mL Route  IntraVENous Administered By  Manan Hatch                Signed:  Keith Marie MD    Part of this note may have been written by using a voice dictation software. The note has been proof read but may still contain some grammatical/other typographical errors.

## 2021-11-30 NOTE — PROGRESS NOTES
ACUTE PHYSICAL THERAPY GOALS:  (Developed with and agreed upon by patient and/or caregiver. )  LTG:  (1.)Mr. Kane Dwyer will move from supine to sit and sit to supine, scoot up and down and roll side to side in bed with INDEPENDENCE within 7 treatment day(s). (2.)Mr. Kane Dwyer will transfer from bed to chair and chair to bed with INDEPENDENCE using the least restrictive device within 7 treatment day(s). (3.)Mr. Kane Dwyer will ambulate with INDEPENDENCE for 250+ feet with the least restrictive device within 7 treatment day(s). ________________________________________________________________________________________________      PHYSICAL THERAPY ASSESSMENT: Initial Assessment and AM PT Treatment Day # 1      Gina Chawla is a 69 y.o. male   PRIMARY DIAGNOSIS: Acute respiratory failure with hypoxia (HCC)  [facility] ([facility] [J96.01]       Reason for Referral:  COVID respiratory failure  ICD-10: Treatment Diagnosis: Generalized Muscle Weakness (M62.81)  Other abnormalities of gait and mobility (R26.89)  [place] / Product Type: Medicare /     ASSESSMENT:     REHAB RECOMMENDATIONS:   Recommendation to date pending progress:  Setting:   No further skilled therapy   Equipment:    None     PRIOR LEVEL OF FUNCTION:  (Prior to Hospitalization) INITIAL/CURRENT LEVEL OF FUNCTION:  (Most Recently Demonstrated)   Bed Mobility:   Independent  Sit to Stand:   Independent  Transfers:   Independent  Gait/Mobility:   Independent Bed Mobility:   Independent  Sit to Stand:   Independent  Transfers:   Supervision  Gait/Mobility:   Supervision     ASSESSMENT:  Mr. Kane Dwyer is admitted from home with COVID respiratory failure. He lives with wife and is completely independent at baseline. Presents today without complaints, needing 6L O2 during activity. Performs bed mobility and sit-stand transfers independently.  Ambulation in room x several laps with supervision, no DME, no loss of balance. Reviewed activity pacing and pursed lip breathing. Mr. Mis Breen is currently functioning close to baseline with mobility, has new oxygen requirements. Will see for a few more sessions during hospital stay to maximize safety and independence. Anticipate dc home with no needs. SUBJECTIVE:   Mr. Mis Breen states, \"'I feel good. \"    SOCIAL HISTORY/LIVING ENVIRONMENT: Lives with wife. Independent, active. 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 No DME use, no falls, no home oxygen.   Home Environment: Private residence  # Steps to Enter: 2  One/Two Story Residence: One story  Living Alone: No  Support Systems: Spouse/Significant Other, Child(rohit)  OBJECTIVE:     PAIN: VITAL SIGNS: LINES/DRAINS:   Pre Treatment: Pain Screen  Pain Scale 1: Numeric (0 - 10)  Pain Intensity 1: 0  Post Treatment: 0/10 Vital Signs  O2 Device: Nasal cannula  O2 Flow Rate (L/min): 6 l/min none  O2 Device: Nasal cannula     GROSS EVALUATION:   Within Functional Limits Abnormal/ Functional Abnormal/ Non-Functional (see comments) Not Tested Comments:   AROM [x] [] [] []    PROM [] [] [] []    Strength [x] [] [] []    Balance [] [x] [] []    Posture [x] [] [] []    Sensation [] [] [] []    Coordination [] [] [] []    Tone [] [] [] []    Edema [] [] [] []    Activity Tolerance [] [x] [] []     [] [] [] []      COGNITION/  PERCEPTION: Intact Impaired   (see comments) Comments:   Orientation [x] []    Vision [x] []    Hearing [x] []    Command Following [x] []    Safety Awareness [x] []     [] []      MOBILITY: I Mod I S SBA CGA Min Mod Max Total  NT x2 Comments:   Bed Mobility    Rolling [x] [] [] [] [] [] [] [] [] [] []    Supine to Sit [x] [] [] [] [] [] [] [] [] [] []    Scooting [x] [] [] [] [] [] [] [] [] [] []    Sit to Supine [x] [] [] [] [] [] [] [] [] [] []    Transfers    Sit to Stand [x] [] [] [] [] [] [] [] [] [] []    Bed to Chair [] [] [x] [] [] [] [] [] [] [] []    Stand to Sit [x] [] [] [] [] [] [] [] [] [] []    I=Independent, Mod I=Modified Independent, S=Supervision, SBA=Standby Assistance, CGA=Contact Guard Assistance,   Min=Minimal Assistance, Mod=Moderate Assistance, Max=Maximal Assistance, Total=Total Assistance, NT=Not Tested  GAIT: I Mod I S SBA CGA Min Mod Max Total  NT x2 Comments:   Level of Assistance [] [] [x] [] [] [] [] [] [] [] []    Distance 120'    DME None    Gait Quality No major deficits, steady    Weightbearing Status N/A     I=Independent, Mod I=Modified Independent, S=Supervision, SBA=Standby Assistance, CGA=Contact Guard Assistance,   Min=Minimal Assistance, Mod=Moderate Assistance, Max=Maximal Assistance, Total=Total Assistance, NT=Not Tested    [address] [place] [address] AM-[facility] Form       How much difficulty does the patient currently have. .. Unable A Lot A Little None   1. Turning over in bed (including adjusting bedclothes, sheets and blankets)? [] 1   [] 2   [] 3   [x] 4   2. Sitting down on and standing up from a chair with arms ( e.g., wheelchair, bedside commode, etc.)   [] 1   [] 2   [] 3   [x] 4   3. Moving from lying on back to sitting on the side of the bed? [] 1   [] 2   [] 3   [x] 4   How much help from another person does the patient currently need. .. Total A Lot A Little None   4. Moving to and from a bed to a chair (including a wheelchair)? [] 1   [] 2   [] 3   [x] 4   5. Need to walk in hospital room? [] 1   [] 2   [x] 3   [] 4   6. Climbing 3-5 steps with a railing? [] 1   [] 2   [x] 3   [] 4   © 2007, Trustees of [address] [place] [id number], under license to [employer]. All rights reserved     Score:  Initial: 22 Most Recent: X (Date: -- )    Interpretation of Tool:  Represents activities that are increasingly more difficult (i.e. Bed mobility, Transfers, Gait).     PLAN:   FREQUENCY/DURATION: PT Plan of Care: 3 times/week for duration of hospital stay or until stated goals are met, whichever comes first.    PROBLEM LIST:   (Skilled intervention is medically necessary to address:)  1. Decreased Activity Tolerance  2. Decreased Balance  3. Decreased Gait Ability  4. Decreased Strength   INTERVENTIONS PLANNED:   (Benefits and precautions of physical therapy have been discussed with the patient.)  1. Therapeutic Activity  2. Therapeutic Exercise/HEP  3. Neuromuscular Re-education  4. Gait Training  5. Manual Therapy  6. Education     TREATMENT:     EVALUATION: Low Complexity : (Untimed Charge)    TREATMENT:   ($$ Therapeutic Activity: 8-22 mins    )  Therapeutic Activity (8 Minutes): Therapeutic activity included Rolling, Supine to Sit, Sit to Supine, Scooting, Transfer Training, Ambulation on level ground, Sitting balance  and Standing balance to improve functional Mobility, Strength, Activity tolerance and balance.     TREATMENT GRID:  N/A    AFTER TREATMENT POSITION/PRECAUTIONS:  Chair, Needs within reach and RN notified    INTERDISCIPLINARY COLLABORATION:  RN/PCT and PT/PTA    TOTAL TREATMENT DURATION:  PT Patient Time In/Time Out  Time In: 0845  Time Out: 631 RCLARIBEL Sutton DPT

## 2021-11-30 NOTE — PROGRESS NOTES
Pt unable to verify home medications. Pt states he can have someone verify them from home tomorrow morning but unable at this time. Will pass this information to day shift nurse to verify. Pt refuses to use urinal. Pt states on prior hospitalizations he has not been able to utilize urinal or external catheter> pt refuses urinal use at this time and ambulates to bathroom for urination.

## 2021-11-30 NOTE — PROGRESS NOTES
ACUTE OT GOALS:  (Developed with and agreed upon by patient and/or caregiver.)  1) Patient will complete lower body bathing and dressing independently. 2) Patient will complete toileting independently. GOAL MET    3) Patient will complete functional transfers independently. GOAL MET   4) Patient will tolerate at least 30 minutes of OT activity with as needed rest breaks while maintaining O2 sats >90%. 5) Patient will verbalize at least 3 energy conservation technique to utilize during ADL/IADL. Timeframe: 7 visits       OCCUPATIONAL THERAPY ASSESSMENT: Initial Assessment and Daily Note OT Treatment Day # 1    Ike Chowdhury is a 69 y.o. male   PRIMARY DIAGNOSIS: Acute respiratory failure with hypoxia (HCC)  Acute respiratory failure with hypoxia ([facility] [J96.01]       Reason for Referral: Decreased activity tolerance secondary to COVID-19    ICD-10: Treatment Diagnosis: Generalized Muscle Weakness (M62.81)  [place] / Product Type: Medicare /   ASSESSMENT:     REHAB RECOMMENDATIONS:   Recommendation to date pending progress:  Setting:   No further skilled therapy   Equipment:    None     PRIOR LEVEL OF FUNCTION:  (Prior to Hospitalization)  INITIAL/CURRENT LEVEL OF FUNCTION:  (Based on today's evaluation)   Bathing:   Independent  Dressing:   Independent  Feeding/Grooming:   Independent  Toileting:   Independent  Functional Mobility:   Independent Bathing:   Set Up  Dressing:   Set Up  Feeding/Grooming:   Modified Independent  Toileting:   Modified Independent  Functional Mobility:   Independent     ASSESSMENT:  Mr. Abebe Clemons is a 70 y/o previously independent male admitted with hypoxia & hypotension related to COVID-19. Today, on 6L O2 and O2 sats ranged from 89-93%. Educated patient on energy conservation techniques. Currently functioning slightly below baseline due to decreased endurance related to COVID.  Will follow 1-2 more session to insure independence. SUBJECTIVE:   Mr. Andrade Cooney states, \"Let's dance. \"    SOCIAL HISTORY/LIVING ENVIRONMENT: Lives with wife. Independent with all ADLs, IADLs. Works as a .    Home Environment: Private residence  # Steps to Enter: 2  One/Two Story Residence: One story  Living Alone: No  Support Systems: Spouse/Significant Other, Child(rohit)    OBJECTIVE:     PAIN: VITAL SIGNS: LINES/DRAINS:   Pre Treatment: Pain Screen  Pain Scale 1: Numeric (0 - 10)  Pain Intensity 1: 0  Post Treatment: 0 Vital Signs  O2 Sat (%): 93 %  O2 Device: Nasal cannula  O2 Flow Rate (L/min): 6 l/min none  O2 Device: Nasal cannula     GROSS EVALUATION:  BUE Within Functional Limits Abnormal/ Functional Abnormal/ Non-Functional (see comments) Not Tested Comments:   AROM [x] [] [] []    PROM [x] [] [] []    Strength [x] [] [] []    Balance [x] [] [] []    Posture [x] [] [] []    Sensation [] [] [] [x]    Coordination [] [] [] [x]    Tone [] [] [] [x]    Edema [] [] [] [x]    Activity Tolerance [] [x] [] []     [] [] [] []      COGNITION/  PERCEPTION: Intact Impaired   (see comments) Comments:   Orientation [x] []    Vision [x] []    Hearing [x] []    Judgment/ Insight [x] []    Attention [x] []    Memory [x] []    Command Following [x] []    Emotional Regulation [x] []     [] []      ACTIVITIES OF DAILY LIVING: I Mod I S SBA CGA Min Mod Max Total NT Comments   BASIC ADLs:              Bathing/ Showering [] [] [] [] [] [] [] [] [] []    Toileting [x] [] [] [] [] [] [] [] [] [] In bathroom    Dressing [] [] [x] [] [] [] [] [] [] [] LB (boots)    Feeding [] [] [] [] [] [] [] [] [] []    Grooming [x] [] [] [] [] [] [] [] [] []    Personal Device Care [] [] [] [] [] [] [] [] [] []    Functional Mobility [x] [] [] [] [] [] [] [] [] []    I=Independent, Mod I=Modified Independent, S=Supervision, SBA=Standby Assistance, CGA=Contact Guard Assistance,   Min=Minimal Assistance, Mod=Moderate Assistance, Max=Maximal Assistance, Total=Total Assistance, NT=Not Tested    MOBILITY: I Mod I S SBA CGA Min Mod Max Total  NT x2 Comments:   Supine to sit [] [] [] [] [] [] [] [] [] [x] []    Sit to supine [] [] [] [] [] [] [] [] [] [x] []    Sit to stand [x] [] [] [] [] [] [] [] [] [] []    Bed to chair [x] [] [] [] [] [] [] [] [] [] []    I=Independent, Mod I=Modified Independent, S=Supervision, SBA=Standby Assistance, CGA=Contact Guard Assistance,   Min=Minimal Assistance, Mod=Moderate Assistance, Max=Maximal Assistance, Total=Total Assistance, NT=Not Tested    [facility] 6 Clicks   Daily Activity Inpatient Short Form        How much help from another person does the patient currently need. .. Total A Lot A Little None   1. Putting on and taking off regular lower body clothing? [] 1   [] 2   [x] 3   [] 4   2. Bathing (including washing, rinsing, drying)? [] 1   [] 2   [x] 3   [] 4   3. Toileting, which includes using toilet, bedpan or urinal?   [] 1   [] 2   [] 3   [x] 4   4. Putting on and taking off regular upper body clothing? [] 1   [] 2   [] 3   [x] 4   5. Taking care of personal grooming such as brushing teeth? [] 1   [] 2   [] 3   [x] 4   6. Eating meals? [] 1   [] 2   [] 3   [x] 4   © 2007, Trustees of [facility] [employer]ois, under license to [employer]. All rights reserved     Score:  Initial: 22 Most Recent: X (Date: -- )   Interpretation of Tool:  Represents activities that are increasingly more difficult (i.e. Bed mobility, Transfers, Gait). PLAN:   FREQUENCY/DURATION: OT Plan of Care: 3 times/week for duration of hospital stay or until stated goals are met, whichever comes first.    PROBLEM LIST:   (Skilled intervention is medically necessary to address:)  1. Decreased ADL/Functional Activities  2. Decreased Activity Tolerance   INTERVENTIONS PLANNED:   (Benefits and precautions of occupational therapy have been discussed with the patient.)  1. Self Care Training  2. Therapeutic Activity  3.  Therapeutic Exercise/HEP  4. Neuromuscular Re-education  5. Education     TREATMENT:     EVALUATION: Low Complexity : (Untimed Charge)    TREATMENT:   ($$ Self Care/Home Management: 8-22 mins    )  Self Care (8 Minutes): Self care including Toileting, Lower Body Dressing, Grooming, Energy Conservation Training and functional mobility in room  to increase independence and activity tolerance.     TREATMENT GRID:  N/A    AFTER TREATMENT POSITION/PRECAUTIONS:  Bed, Needs within reach and RN notified    INTERDISCIPLINARY COLLABORATION:  RN/PCT and OT/BURCIAGA    TOTAL TREATMENT DURATION:  OT Patient Time In/Time Out  Time In: 1113  Time Out: [address] [facility], OTR/L

## 2021-11-30 NOTE — PROGRESS NOTES
crp 6.9 and creatinine 1.69, will recheck both labs tomorrow in event qualifies for baricitinib if CRP > 7.5 and renal function improves  Danny Dick MD

## 2021-11-30 NOTE — PROGRESS NOTES
MSN, CM:  Spoke with patient this AM about discharge planning. Patient lives with his wife \"Corinne\" in own mobile home with 2 steps for entrance. Patient has two daughters \"Jessica and Stella Piña" which both live locally. Patient is independent with all ADL's and requires no equipment for ambulation. Patient denies any home oxygen, HH or rehab in the past.  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 Patient confirms PCP is Dr. Yifan Lucia and patient drives himself to all appointments. PT consulted for evaluation and recommendations. Case Management will continue to follow for any discharge needs. Care Management Interventions  PCP Verified by CM: Yes (Dr. Yifan Lucia)  Mode of Transport at Discharge:  Other (see comment) (family to transport)  Health Maintenance Reviewed: Yes  Physical Therapy Consult: Yes  Support Systems: Spouse/Significant Other, Child(rohit)  Confirm Follow Up Transport: Self  Freedom of Choice List was Provided with Basic Dialogue that Supports the Patient's Individualized Plan of Care/Goals, Treatment Preferences and Shares the Quality Data Associated with the Providers?: Yes  Discharge Location  Discharge Placement: Home

## 2021-11-30 NOTE — PROGRESS NOTES
Hospitalist Progress Note   Admit Date:  2021  3:01 PM   Name:  Loren Rodrigez   Age:  69 y.o. Sex:  male  :  1950   MRN:  [id number]   Room:  [facility]    Presenting Complaint: Positive For Covid-19    Reason(s) for Admission: Acute respiratory failure with hypoxia [facility]) [J96.01]     Hospital Course & Interval History:   Loren Rodrigez is a 69 y.o. male with medical history of HTN, CAD, HLD, depression, BPH, obesity, and COVID-19 infection (21 at [facility]). Taking oral amoxil without improvement. Endorses SOB and presented for further evaluation. Patient has not been vaccinated. Hypoxic to 85% on RA and placed on supplemental O2 4 L NC with improvement. CXR shows left lung opacities. LEONCIO and hypotension present on admission now both resolved s/p 3L NS. Initiated Decadron  and Baricitinib . Currently requiring 6L HF NC to maintain O2 saturations. Subjective (21):  Patient alert, oriented, conversational. Denies chest pain, endorses mild SOB. Eating well, in fact, he wants more food.      Assessment & Plan:     Principal Problem:  Acute respiratory failure with hypoxia (HCC)   Pneumonia due to COVID-19 virus   -COVID-19 positive on 21 (test results visible under encounters)  -Hypoxic to 85% on RA requiring use of supplemental O2  -Procal < 0.05 but initiated on Rocephin and Doxycycline due to CXR findings, D2/5  -Decadron D 2/10  -CRP 6.9 --> 4.2  -Patient agrees to Baricitinib, risks explained by admitting doc - qualifies with O2 requirements today per pharmacy, will initiate , 4 mg PO daily x 14 days, GFR > 60  -Supplemental O2 at 6L NC, wean as tolerated  -Prone positioning as tolerated, aggressive IS use, OOB to chair with all meals  -Mobilize as able, PT consulted    DNI (do not intubate)  -Noted, will accept CPR and ACLS    Active Problems:  HLD (hyperlipidemia)   -Continue Lipitor    HTN (hypertension)   -Home ACE-HTCTZ held for hypotension on admission  -BP stable 125/70 without intervention, can re-start home med as needed    Hypotension, resolved  -Hypotensive on presentation s/p 3L NS boluses with improvement in BP    CAD (coronary artery disease)   -Continue ASA and Lipitor    GERD (gastroesophageal reflux disease)   -Continue PPI    Obesity (BMI 30-39.9)   -Increases all-cause mortality and risks associated with COVID-19 infection, needs diet and lifestyle modifications    LEONCIO (acute kidney injury) ([facility] resolved  -Cr 1.69 on presentation, s/p IVF resuscitation, resolved with Cr. 1.02 currently    Hypokalemia, resolved    Hypomagnesemia  -Mag 1.4, replete, will monitor in am    Pre-Diabetes  -A1C 5.9 with , needs diet modifications and lifestyle changes to dec risk of DM progression  -On Decadron, anticipate sugars may run higher, SSI ACHS as needed    BPH  -Continue Flomax    Depression  -Resume Celexa and prn Klonopin    Thrombocytopenia  -Likely due to viral process, plt 127, will continue to monitor    Dispo/Discharge Planning:  Pending clinical course    Diet:  ADULT DIET Regular  DVT PPx: Lovenox  Code status: Partial Code    Hospital Problems as of 11/30/2021 Date Reviewed: 3/3/2021          Codes Class Noted - Resolved POA    * (Principal) Acute respiratory failure with hypoxia ([facility] ICD-10-CM: J96.01  ICD-9-CM: 518.81  11/29/2021 - Present Yes        Obesity (BMI 30-39.9) (Chronic) ICD-10-CM: E66.9  ICD-9-CM: 278.00  11/29/2021 - Present Yes        LEONCIO (acute kidney injury) ([facility] ICD-10-CM: N17.9  ICD-9-CM: 584.9  11/29/2021 - Present Yes        Hypokalemia ICD-10-CM: E87.6  ICD-9-CM: 276.8  11/29/2021 - Present Yes        COVID-19 ICD-10-CM: U07.1  ICD-9-CM: 079.89  11/29/2021 - Present Unknown        DNI (do not intubate) ICD-10-CM: Z78.9  ICD-9-CM: V49.89  11/29/2021 - Present Yes        Hyperglycemia ICD-10-CM: R73.9  ICD-9-CM: 790.29  11/29/2021 - Present Yes        HLD (hyperlipidemia) ICD-10-CM: E78.5  ICD-9-CM: 272.4  12/19/2012 - Present Yes        HTN (hypertension) ICD-10-CM: I10  ICD-9-CM: 401.9  12/19/2012 - Present Yes        CAD (coronary artery disease) ICD-10-CM: I25.10  ICD-9-CM: 414.00  12/19/2012 - Present Yes        GERD (gastroesophageal reflux disease) ICD-10-CM: K21.9  ICD-9-CM: 530.81  12/19/2012 - Present Yes              Objective:     Patient Vitals for the past 24 hrs:   Temp Pulse Resp BP SpO2   11/30/21 0400 -- 66 -- -- --   11/30/21 0332 97.9 °F (36.6 °C) 72 21 135/77 91 %   11/30/21 0000 -- 75 -- -- --   11/29/21 2258 97.5 °F (36.4 °C) 72 21 135/77 91 %   11/29/21 2034 -- -- -- -- 91 %   11/29/21 2010 97.7 °F (36.5 °C) 72 21 115/68 90 %   11/29/21 1848 -- -- -- (!) 115/55 91 %   11/29/21 1840 -- -- -- -- 91 %   11/29/21 1831 -- -- -- -- 92 %   11/29/21 1827 -- -- -- (!) 111/57 --   11/29/21 1757 -- -- -- -- 91 %   11/29/21 1755 -- -- -- -- 93 %   11/29/21 1748 -- 80 22 (!) 102/57 92 %   11/29/21 1727 -- 82 20 (!) 110/56 92 %   11/29/21 1721 -- -- -- -- 90 %   11/29/21 1708 -- -- -- 111/62 90 %   11/29/21 1648 -- -- -- (!) 115/57 91 %   11/29/21 1627 -- -- -- 138/63 92 %   11/29/21 1612 -- -- -- -- (!) 89 %   11/29/21 1550 -- -- -- -- 97 %   11/29/21 1548 -- -- -- (!) 117/58 95 %   11/29/21 1541 -- -- -- -- 96 %   11/29/21 1528 -- -- -- 105/75 95 %   11/29/21 1525 -- -- -- -- 97 %   11/29/21 1518 -- -- -- -- 96 %   11/29/21 1517 -- -- -- -- 95 %   11/29/21 1508 -- -- -- (!) 122/59 --   11/29/21 1400 -- -- -- 96/64 93 %   11/29/21 1355 98.9 °F (37.2 °C) 79 25 (!) 78/56 (!) 85 %     Oxygen Therapy  O2 Sat (%): 91 % (11/30/21 0332)  Pulse via Oximetry: 75 beats per minute (11/29/21 2034)  O2 Device: Non-invasive cannula (11/29/21 2258)  O2 Flow Rate (L/min): 6 l/min (11/29/21 2258)    Estimated body mass index is 32.55 kg/m² as calculated from the following:    Height as of this encounter: 6' (1.829 m). Weight as of this encounter: 108.9 kg (240 lb).   No intake or output data in the 24 hours ending 11/30/21 9752      Physical Exam:   Blood pressure 135/77, pulse 66, temperature 97.9 °F (36.6 °C), resp. rate 21, height 6' (1.829 m), weight 108.9 kg (240 lb), SpO2 91 %. General:    Well nourished. No overt distress. Alert. Conversational.  Head:  Normocephalic, atraumatic  Eyes:  Sclerae appear normal.  Pupils equally round. Glasses intact. ENT:  Nares appear normal, no drainage. Moist oral mucosa  Neck:  No restricted ROM. Trachea midline   CV:   RRR. No m/r/g. No jugular venous distension. Lungs:   Crackles LLL otherwise clear to auscultation. Respirations even, unlabored  Abdomen: Bowel sounds present. Abdomen rounded but soft, nontender, nondistended. Extremities: No cyanosis or clubbing. No edema  Skin:     No rashes and normal coloration. Warm and dry. Neuro:  CN II-XII grossly intact. Sensation intact. A&Ox3  Psych:  Normal mood and affect.       I have reviewed ordered lab tests and independently visualized imaging below:    Recent Labs:  Recent Results (from the past 48 hour(s))   EKG, 12 LEAD, INITIAL    Collection Time: 11/29/21  2:02 PM   Result Value Ref Range    Ventricular Rate 83 BPM    Atrial Rate 83 BPM    P-R Interval 148 ms    QRS Duration 106 ms    Q-T Interval 350 ms    QTC Calculation (Bezet) 411 ms    Calculated P Axis 56 degrees    Calculated R Axis -63 degrees    Calculated T Axis 55 degrees    Diagnosis       Normal sinus rhythm  Left anterior fascicular block  Possible Anterior infarct , age undetermined  Abnormal ECG  No previous ECGs available  Confirmed by Christine Wolfe MD (), MEDARDO DEMPSEY ([id number]) on 11/29/2021 5:01:26 PM     LACTIC ACID    Collection Time: 11/29/21  2:04 PM   Result Value Ref Range    Lactic acid 1.8 0.4 - 2.0 MMOL/L   CBC WITH AUTOMATED DIFF    Collection Time: 11/29/21  2:04 PM   Result Value Ref Range    WBC 5.4 4.3 - 11.1 K/uL    RBC 5.57 4.23 - 5.6 M/uL    HGB 15.8 13.6 - 17.2 g/dL    HCT 47.8 41.1 - 50.3 %    MCV 85.8 79.6 - 97.8 FL    MCH 28.4 26.1 - 32.9 PG    MCHC 33.1 31.4 - 35.0 g/dL    RDW 14.3 11.9 - 14.6 %    PLATELET 148 723 - 525 K/uL    MPV 9.7 9.4 - 12.3 FL    ABSOLUTE NRBC 0.00 0.0 - 0.2 K/uL    DF AUTOMATED      NEUTROPHILS 74 43 - 78 %    LYMPHOCYTES 16 13 - 44 %    MONOCYTES 6 4.0 - 12.0 %    EOSINOPHILS 4 0.5 - 7.8 %    BASOPHILS 0 0.0 - 2.0 %    IMMATURE GRANULOCYTES 1 0.0 - 5.0 %    ABS. NEUTROPHILS 4.0 1.7 - 8.2 K/UL    ABS. LYMPHOCYTES 0.8 0.5 - 4.6 K/UL    ABS. MONOCYTES 0.4 0.1 - 1.3 K/UL    ABS. EOSINOPHILS 0.2 0.0 - 0.8 K/UL    ABS. BASOPHILS 0.0 0.0 - 0.2 K/UL    ABS. IMM. GRANS. 0.0 0.0 - 0.5 K/UL   METABOLIC PANEL, COMPREHENSIVE    Collection Time: 11/29/21  2:04 PM   Result Value Ref Range    Sodium 133 (L) 138 - 145 mmol/L    Potassium 3.3 (L) 3.5 - 5.1 mmol/L    Chloride 98 98 - 107 mmol/L    CO2 28 21 - 32 mmol/L    Anion gap 7 7 - 16 mmol/L    Glucose 154 (H) 65 - 100 mg/dL    BUN 23 8 - 23 MG/DL    Creatinine 1.69 (H) 0.8 - 1.5 MG/DL    GFR est AA 52 (L) >60 ml/min/1.73m2    GFR est non-AA 43 (L) >60 ml/min/1.73m2    Calcium 8.8 8.3 - 10.4 MG/DL    Bilirubin, total 0.6 0.2 - 1.1 MG/DL    ALT (SGPT) 49 12 - 65 U/L    AST (SGOT) 41 (H) 15 - 37 U/L    Alk.  phosphatase 72 50 - 136 U/L    Protein, total 7.3 6.3 - 8.2 g/dL    Albumin 3.2 3.2 - 4.6 g/dL    Globulin 4.1 (H) 2.3 - 3.5 g/dL    A-G Ratio 0.8 (L) 1.2 - 3.5     PROCALCITONIN    Collection Time: 11/29/21  2:04 PM   Result Value Ref Range    Procalcitonin <0.05 0.00 - 0.49 ng/mL   CRP, HIGH SENSITIVITY    Collection Time: 11/29/21  2:04 PM   Result Value Ref Range    CRP, High sensitivity 61.1 mg/L   C REACTIVE PROTEIN, QT    Collection Time: 11/29/21  2:04 PM   Result Value Ref Range    C-Reactive protein 6.9 (H) 0.0 - 0.9 mg/dL   CULTURE, BLOOD    Collection Time: 11/29/21  2:08 PM    Specimen: Blood   Result Value Ref Range    Special Requests: LEFT  HAND        Culture result: NO GROWTH AFTER 16 HOURS     CULTURE, BLOOD    Collection Time: 11/29/21  8:15 PM    Specimen: Blood   Result Value Ref Range    Special Requests: LEFT  FOREARM        Culture result: NO GROWTH AFTER 9 HOURS     MAGNESIUM    Collection Time: 11/29/21  8:15 PM   Result Value Ref Range    Magnesium 1.4 (L) 1.8 - 2.4 mg/dL   GLUCOSE, POC    Collection Time: 11/29/21  8:58 PM   Result Value Ref Range    Glucose (POC) 184 (H) 65 - 100 mg/dL    Performed by Jackson (Elaine)        All Micro Results     Procedure Component Value Units Date/Time    BLOOD CULTURE [[id number]] Collected: 11/29/21 1408    Order Status: Completed Specimen: Blood Updated: 11/30/21 0650     Special Requests: --        LEFT  HAND       Culture result: NO GROWTH AFTER 16 HOURS       BLOOD CULTURE [[id number]] Collected: 11/29/21 2015    Order Status: Completed Specimen: Blood Updated: 11/30/21 0650     Special Requests: --        LEFT  FOREARM       Culture result: NO GROWTH AFTER 9 HOURS             Other Studies:  XR CHEST PORT    Result Date: 11/29/2021  EXAM: XR CHEST PORT INDICATION: meets SIRS criteria COMPARISON: None FINDINGS: The cardiomediastinal silhouette is within normal limits. No pleural effusion or pneumothorax. There are hazy airspace opacities in the left midlung. Lungs otherwise clear. Osseous structures grossly intact. Hazy opacities in the left midlung worrisome for pneumonia in the correct clinical setting.        Current Meds:  Current Facility-Administered Medications   Medication Dose Route Frequency    aspirin delayed-release tablet 81 mg  81 mg Oral QHS    atorvastatin (LIPITOR) tablet 40 mg  40 mg Oral DAILY    citalopram (CELEXA) tablet 20 mg  20 mg Oral DAILY    clonazePAM (KlonoPIN) tablet 0.5 mg  0.5 mg Oral Q8H PRN    pantoprazole (PROTONIX) tablet 40 mg  40 mg Oral ACB    tamsulosin (FLOMAX) capsule 0.4 mg  0.4 mg Oral DAILY    sodium chloride (NS) flush 5-40 mL  5-40 mL IntraVENous Q8H    sodium chloride (NS) flush 5-40 mL  5-40 mL IntraVENous PRN    acetaminophen (TYLENOL) tablet 650 mg  650 mg Oral Q6H PRN    Or  acetaminophen (TYLENOL) suppository 650 mg  650 mg Rectal Q6H PRN    polyethylene glycol (MIRALAX) packet 17 g  17 g Oral DAILY PRN    ondansetron (ZOFRAN ODT) tablet 4 mg  4 mg Oral Q8H PRN    Or    ondansetron (ZOFRAN) injection 4 mg  4 mg IntraVENous Q6H PRN    enoxaparin (LOVENOX) injection 40 mg  40 mg SubCUTAneous DAILY    dexamethasone (DECADRON) 10 mg/mL injection 6 mg  6 mg IntraVENous Q24H    cefTRIAXone (ROCEPHIN) 1 g in 0.9% sodium chloride (MBP/ADV) 50 mL MBP  1 g IntraVENous Q24H    insulin lispro (HUMALOG) injection   SubCUTAneous AC&HS    tuberculin injection 5 Units  5 Units IntraDERMal ONCE    doxycycline (VIBRAMYCIN) capsule 100 mg  100 mg Oral Q12H     Labs, vital signs, diagnostic testing reviewed. Case discussed with patient, care team, Dr. Rebecca Perez. Signed:  Shama Chowdary NP    Part of this note may have been written by using a voice dictation software. The note has been proof read but may still contain some grammatical/other typographical errors.

## 2021-11-30 NOTE — ACP (ADVANCE CARE PLANNING)
Advance Care Planning   Advance Care Planning Inpatient Note  [address] [facility] Department    Today's Date: 11/30/2021  Unit: SFD 8 MED SURG    Received request from Health Care provider. Upon review of chart and communication with care team, patient's decision making abilities are not in question. Patient was/were present in the room during visit. ( did not enter room, as pt is in [facility].)     Goals of ACP Conversation:  Provide ACP paperwork ([facility] form)    Health Care Decision Makers:      Primary Decision Maker: [address] - [phone]    Secondary Decision Maker: Fer Banks Child - [phone]    Secondary Decision Maker: Alfonsobetty Alyssa - Daughter - [phone]      Summary:  No Decision Maker named by patient at this time  Decision makers are automatically pulled from Casemanager's note. Advance Care Planning Documents (Patient Wishes) on file:  None     Assessment:    Upon receipt of consult, reviewed pt's chart. Pt is , per chart, and his primary decision maker would be his wife. Interventions:  Provided HCPOA form with 's phone number with RN's assistance. Care Preferences Communicated:  No    Outcomes/Plan:  Follow-up, as needed.     Ervin Haywood [facility] on 11/30/2021 at 2:25 PM

## 2021-11-30 NOTE — ACP (ADVANCE CARE PLANNING)
Advance Care Planning   Advance Care Planning Inpatient Note  [address] [facility] Department    Today's Date: 11/30/2021  Unit: SFD 8 MED SURG    Received request from Health Care provider. Upon review of chart and communication with care team, patient's decision making abilities are not in question. Patient was/were present in the room during visit. ( did not enter room, as pt is in [facility].)     Goals of ACP Conversation:  Provide ACP paperwork ([address] [facility] [address] form)    Health Care Decision Makers:      Primary Decision Maker: [address] - [phone]    Secondary Decision Maker: Cathy Alfonso Child - [phone]    Secondary Decision Maker: Magdalena Lopez - Daughter - [phone]      Summary:  No Decision Maker named by patient at this time  Decision makers are automatically pulled from Casemanager's note. Advance Care Planning Documents (Patient Wishes) on file:  None     Assessment:    Upon receipt of consult, reviewed pt's chart. Pt is , per chart, and his primary decision maker would be his wife. Interventions:  Provided HCPOA form with 's phone number with RN's assistance. Care Preferences Communicated:  No    Outcomes/Plan:  Follow-up, as needed.     José Luis [facility] on 11/30/2021 at 2:25 PM

## 2021-11-30 NOTE — ACP (ADVANCE CARE PLANNING)
Advance Care Planning     General Advance Care Planning (ACP) Conversation      Date of Conversation: 11/29/2021  Conducted with: Patient with Decision Making Capacity    Healthcare Decision Maker:     Primary Decision Maker: [address] - [phone]    Secondary Decision Maker: Emperatriz Serrano - [phone]    Secondary Decision Maker: Jesse Godinez - Daughter - [phone]  Click here to complete [address] including selection of the Healthcare Decision Maker Relationship (ie \"Primary\")      Today we documented Decision Maker(s) consistent with Legal Next of Kin hierarchy. Content/Action Overview:   Has NO ACP documents/care preferences - requested patient complete ACP documents  Reviewed DNR/DNI and patient elects Full Code (Attempt Resuscitation), but does not want intubation.   Topics discussed: ventilation preferences and resuscitation preferences       Length of Voluntary ACP Conversation in minutes:  <16 minutes (Non-Billable)    Ky Pope RN

## 2021-11-30 NOTE — PROGRESS NOTES
Advance Care Planning   Advance Care Planning Inpatient Note  [address] [facility] Department    Today's Date: 11/30/2021  Unit: SFD 8 MED SURG    Received request from Health Care provider. Upon review of chart and communication with care team, patient's decision making abilities are not in question. Patient was/were present in the room during visit. ( did not enter room, as pt is in [facility].)     Goals of ACP Conversation:  Provide ACP paperwork ([facility] form)    Health Care Decision Makers:      Primary Decision Maker: [address] - [phone]    Secondary Decision Maker: Lane Nancy Child - [phone]    Secondary Decision Maker: Sneha Laimadeleine - Daughter - [phone]      Summary:  No Decision Maker named by patient at this time  Decision makers are automatically pulled from Casemanager's note. Advance Care Planning Documents (Patient Wishes) on file:  None     Assessment:    Upon receipt of consult, reviewed pt's chart. Pt is , per chart, and his primary decision maker would be his wife. Interventions:  Provided HCPOA form with 's phone number with RN's assistance. Care Preferences Communicated:  No    Outcomes/Plan:  Follow-up, as needed.     [facility] on 11/30/2021 at 2:25 PM

## 2021-12-01 NOTE — PROGRESS NOTES
Pt c/o \"cpughing up blood\" s/p ambulation to bathroom. Pt able to produce sample for me to assess. Sputum was thick tan, heavily pink tinged. BBS = diminished with few scattered crackles. Pt 89% on 6L nc, feeling sob. O2 flow increased to 7L nc. Humidifier bottle added. Pt continues to refuse urinal. States he used the rest room to urinate twice during the night. Dr Carlito Reynaga contacted via [address] [place] [address] regarding this new concern. No new orders at this time.

## 2021-12-01 NOTE — PROGRESS NOTES
Beside shift report received from [facility].  Patient lying in bed  Respirations even and unlabored on 7L NC  No signs of distress  No needs expressed at this time  Safety measures in place

## 2021-12-01 NOTE — PROGRESS NOTES
MSn, CM:  Patient currently on 7L NC this AM.  Patient has received Baricitinib and Decadron during this admission. Patient has no discharge needs at this time per PT. Case Management will continue to follow.

## 2021-12-01 NOTE — PROGRESS NOTES
Hospitalist Progress Note   Admit Date:  2021  3:01 PM   Name:  Talon Almonte   Age:  69 y.o. Sex:  male  :  1950   MRN:  [id number]   Room:  [facility]    Presenting Complaint: Positive For Covid-19    Reason(s) for Admission: Acute respiratory failure with hypoxia [facility]) [J96.01]     Hospital Course & Interval History:   Talon Almonte is a 69 y.o. male with medical history of HTN, CAD, HLD, depression, BPH, obesity, and COVID-19 infection (21 at [facility]). Taking oral amoxil without improvement. Endorses SOB and presented for further evaluation. Patient has not been vaccinated. Hypoxic to 85% on RA and placed on supplemental O2 4 L NC with improvement. CXR shows left lung opacities. LEONCIO and hypotension present on admission now both resolved s/p 3L NS. Initiated Decadron  and Baricitinib . Currently requiring 7L HF NC to maintain O2 saturations. PT/OT evaluated with no needs identified. Subjective (21): Patient alert, oriented, conversational, sitting in chair. Denies chest pain. \"I feel great except my shortness of breath. Do you think I will have long-term damage from this? \" SOB persistent, cough intermittent and non-productive. Spoke with wife, Willem Dye via phone and updated on plan of care.      Assessment & Plan:     Principal Problem:  Acute respiratory failure with hypoxia (HCC)   Pneumonia due to COVID-19 virus   -COVID-19 positive on 21 (test results visible under encounters)  -Hypoxic to 85% on RA requiring use of supplemental O2  -Procal < 0.05 but initiated on Rocephin and Doxycycline due to CXR findings, D3/5  -Decadron D 3/10  -CRP 6.9 --> 4.2  -Baricitinib initiated   -Prone positioning as tolerated, aggressive IS use, OOB to chair with all meals  -Mobilize as able, PT/OT evaluated with no needs identified  -Supplemental O2 increased from 6 to 7 L NC, febrile overnight to 39.1, encouraged continued mobilization as able, IS use, will add Mucinex, will repeat labs in am    DNI (do not intubate)  -Noted, will accept CPR and ACLS    Active Problems:  HLD (hyperlipidemia)   -Continue Lipitor    HTN (hypertension)   -Home ACE-HTCTZ held for hypotension on admission  -BP stable without intervention, can re-start home med as needed    Hypotension, resolved  -Hypotensive on presentation s/p 3L NS boluses with improvement in BP    CAD (coronary artery disease)   -Continue ASA and Lipitor    GERD (gastroesophageal reflux disease)   -Continue PPI    Obesity (BMI 30-39.9)   -Increases all-cause mortality and risks associated with COVID-19 infection, needs diet and lifestyle modifications    LEONCIO (acute kidney injury) ([facility] resolved  -Cr 1.69 on presentation, s/p IVF resuscitation  -Cr 1.05, continue to monitor    Hypokalemia, resolved  Hypomagnesemia, resolved    Pre-Diabetes  -A1C 5.9 with , needs diet modifications and lifestyle changes to dec risk of DM progression  -On Decadron, anticipate sugars may run higher, SSI ACHS as needed    BPH  -Continue Flomax    Depression  -Resume Celexa and prn Klonopin    Thrombocytopenia, stable  -Likely due to viral process, plt improved to 159, continue to monitor    Dispo/Discharge Planning:  Pending clinical course    Diet:  ADULT DIET Regular  DVT PPx: Lovenox  Code status: Partial Code    Hospital Problems as of 12/1/2021 Date Reviewed: 3/3/2021          Codes Class Noted - Resolved POA    * (Principal) Acute respiratory failure with hypoxia ([facility] ICD-10-CM: J96.01  ICD-9-CM: 518.81  11/29/2021 - Present Yes        Obesity (BMI 30-39.9) (Chronic) ICD-10-CM: E66.9  ICD-9-CM: 278.00  11/29/2021 - Present Yes        LEONCIO (acute kidney injury) ([facility] ICD-10-CM: N17.9  ICD-9-CM: 584.9  11/29/2021 - Present Yes        Hypokalemia ICD-10-CM: E87.6  ICD-9-CM: 276.8  11/29/2021 - Present Yes        COVID-19 ICD-10-CM: U07.1  ICD-9-CM: 079.89  11/29/2021 - Present Unknown        DNI (do not intubate) ICD-10-CM: Z78.9  ICD-9-CM: V49.89  11/29/2021 - Present Yes        Hyperglycemia ICD-10-CM: R73.9  ICD-9-CM: 790.29  11/29/2021 - Present Yes        HLD (hyperlipidemia) ICD-10-CM: E78.5  ICD-9-CM: 272.4  12/19/2012 - Present Yes        HTN (hypertension) ICD-10-CM: I10  ICD-9-CM: 401.9  12/19/2012 - Present Yes        CAD (coronary artery disease) ICD-10-CM: I25.10  ICD-9-CM: 414.00  12/19/2012 - Present Yes        GERD (gastroesophageal reflux disease) ICD-10-CM: K21.9  ICD-9-CM: 530.81  12/19/2012 - Present Yes              Objective:     Patient Vitals for the past 24 hrs:   Temp Pulse Resp BP SpO2   12/01/21 1247 97 °F (36.1 °C) -- -- -- --   12/01/21 1135 (!) 102.4 °F (39.1 °C) 80 20 118/65 91 %   12/01/21 0742 99.2 °F (37.3 °C) 74 20 122/68 90 %   12/01/21 0322 98.4 °F (36.9 °C) 65 20 113/70 90 %   12/01/21 0000 -- (!) 55 -- -- --   11/30/21 2310 98.5 °F (36.9 °C) 67 20 (!) 137/56 91 %   11/30/21 2007 98.8 °F (37.1 °C) 66 20 111/66 91 %   11/30/21 2000 -- (!) 59 -- -- --   11/30/21 1717 98.9 °F (37.2 °C) 75 18 136/76 93 %     Oxygen Therapy  O2 Sat (%): 91 % (12/01/21 1135)  Pulse via Oximetry: 75 beats per minute (11/29/21 2034)  O2 Device: Nasal cannula (12/01/21 0322)  O2 Flow Rate (L/min): 7 l/min (12/01/21 0530)    Estimated body mass index is 32.55 kg/m² as calculated from the following:    Height as of this encounter: 6' (1.829 m). Weight as of this encounter: 108.9 kg (240 lb). Intake/Output Summary (Last 24 hours) at 12/1/2021 1308  Last data filed at 12/1/2021 0943  Gross per 24 hour   Intake 1100 ml   Output --   Net 1100 ml         Physical Exam:   Blood pressure 118/65, pulse 80, temperature 97 °F (36.1 °C), resp. rate 20, height 6' (1.829 m), weight 108.9 kg (240 lb), SpO2 91 %. General:    Well nourished. No overt distress. Alert. Conversational.  Head:  Normocephalic, atraumatic  Eyes:  Sclerae appear normal.  Pupils equally round. Glasses at bedside. ENT:  Nares appear normal, no drainage.   Moist oral mucosa  Neck:  No restricted ROM. Trachea midline   CV:   RRR. No m/r/g. No jugular venous distension. Lungs:   Crackles scattered bilaterally posterior. Respirations even, unlabored  Abdomen: Bowel sounds present. Abdomen rounded but soft, nontender, nondistended. Extremities: No cyanosis or clubbing. No edema  Skin:     No rashes and normal coloration. Warm and dry. Neuro:  CN II-XII grossly intact. Sensation intact. A&Ox3  Psych:  Normal mood and affect. I have reviewed ordered lab tests and independently visualized imaging below:    Recent Labs:  Recent Results (from the past 48 hour(s))   EKG, 12 LEAD, INITIAL    Collection Time: 11/29/21  2:02 PM   Result Value Ref Range    Ventricular Rate 83 BPM    Atrial Rate 83 BPM    P-R Interval 148 ms    QRS Duration 106 ms    Q-T Interval 350 ms    QTC Calculation (Bezet) 411 ms    Calculated P Axis 56 degrees    Calculated R Axis -63 degrees    Calculated T Axis 55 degrees    Diagnosis       Normal sinus rhythm  Left anterior fascicular block  Possible Anterior infarct , age undetermined  Abnormal ECG  No previous ECGs available  Confirmed by Kike Joy MD (), MEDARDO DEMPSEY ([id number]) on 11/29/2021 5:01:26 PM     LACTIC ACID    Collection Time: 11/29/21  2:04 PM   Result Value Ref Range    Lactic acid 1.8 0.4 - 2.0 MMOL/L   CBC WITH AUTOMATED DIFF    Collection Time: 11/29/21  2:04 PM   Result Value Ref Range    WBC 5.4 4.3 - 11.1 K/uL    RBC 5.57 4.23 - 5.6 M/uL    HGB 15.8 13.6 - 17.2 g/dL    HCT 47.8 41.1 - 50.3 %    MCV 85.8 79.6 - 97.8 FL    MCH 28.4 26.1 - 32.9 PG    MCHC 33.1 31.4 - 35.0 g/dL    RDW 14.3 11.9 - 14.6 %    PLATELET 437 908 - 860 K/uL    MPV 9.7 9.4 - 12.3 FL    ABSOLUTE NRBC 0.00 0.0 - 0.2 K/uL    DF AUTOMATED      NEUTROPHILS 74 43 - 78 %    LYMPHOCYTES 16 13 - 44 %    MONOCYTES 6 4.0 - 12.0 %    EOSINOPHILS 4 0.5 - 7.8 %    BASOPHILS 0 0.0 - 2.0 %    IMMATURE GRANULOCYTES 1 0.0 - 5.0 %    ABS.  NEUTROPHILS 4.0 1.7 - 8.2 K/UL ABS. LYMPHOCYTES 0.8 0.5 - 4.6 K/UL    ABS. MONOCYTES 0.4 0.1 - 1.3 K/UL    ABS. EOSINOPHILS 0.2 0.0 - 0.8 K/UL    ABS. BASOPHILS 0.0 0.0 - 0.2 K/UL    ABS. IMM. GRANS. 0.0 0.0 - 0.5 K/UL   METABOLIC PANEL, COMPREHENSIVE    Collection Time: 11/29/21  2:04 PM   Result Value Ref Range    Sodium 133 (L) 138 - 145 mmol/L    Potassium 3.3 (L) 3.5 - 5.1 mmol/L    Chloride 98 98 - 107 mmol/L    CO2 28 21 - 32 mmol/L    Anion gap 7 7 - 16 mmol/L    Glucose 154 (H) 65 - 100 mg/dL    BUN 23 8 - 23 MG/DL    Creatinine 1.69 (H) 0.8 - 1.5 MG/DL    GFR est AA 52 (L) >60 ml/min/1.73m2    GFR est non-AA 43 (L) >60 ml/min/1.73m2    Calcium 8.8 8.3 - 10.4 MG/DL    Bilirubin, total 0.6 0.2 - 1.1 MG/DL    ALT (SGPT) 49 12 - 65 U/L    AST (SGOT) 41 (H) 15 - 37 U/L    Alk.  phosphatase 72 50 - 136 U/L    Protein, total 7.3 6.3 - 8.2 g/dL    Albumin 3.2 3.2 - 4.6 g/dL    Globulin 4.1 (H) 2.3 - 3.5 g/dL    A-G Ratio 0.8 (L) 1.2 - 3.5     PROCALCITONIN    Collection Time: 11/29/21  2:04 PM   Result Value Ref Range    Procalcitonin <0.05 0.00 - 0.49 ng/mL   CRP, HIGH SENSITIVITY    Collection Time: 11/29/21  2:04 PM   Result Value Ref Range    CRP, High sensitivity 61.1 mg/L   C REACTIVE PROTEIN, QT    Collection Time: 11/29/21  2:04 PM   Result Value Ref Range    C-Reactive protein 6.9 (H) 0.0 - 0.9 mg/dL   CULTURE, BLOOD    Collection Time: 11/29/21  2:08 PM    Specimen: Blood   Result Value Ref Range    Special Requests: LEFT  HAND        Culture result: NO GROWTH 2 DAYS     CULTURE, BLOOD    Collection Time: 11/29/21  8:15 PM    Specimen: Blood   Result Value Ref Range    Special Requests: LEFT  FOREARM        Culture result: NO GROWTH 2 DAYS     MAGNESIUM    Collection Time: 11/29/21  8:15 PM   Result Value Ref Range    Magnesium 1.4 (L) 1.8 - 2.4 mg/dL   GLUCOSE, POC    Collection Time: 11/29/21  8:58 PM   Result Value Ref Range    Glucose (POC) 184 (H) 65 - 100 mg/dL    Performed by Nahid)BSN    METABOLIC PANEL, COMPREHENSIVE    Collection Time: 11/30/21  8:07 AM   Result Value Ref Range    Sodium 138 138 - 145 mmol/L    Potassium 3.6 3.5 - 5.1 mmol/L    Chloride 107 98 - 107 mmol/L    CO2 24 21 - 32 mmol/L    Anion gap 7 7 - 16 mmol/L    Glucose 106 (H) 65 - 100 mg/dL    BUN 20 8 - 23 MG/DL    Creatinine 1.02 0.8 - 1.5 MG/DL    GFR est AA >60 >60 ml/min/1.73m2    GFR est non-AA >60 >60 ml/min/1.73m2    Calcium 7.9 (L) 8.3 - 10.4 MG/DL    Bilirubin, total 0.4 0.2 - 1.1 MG/DL    ALT (SGPT) 41 12 - 65 U/L    AST (SGOT) 41 (H) 15 - 37 U/L    Alk. phosphatase 58 50 - 136 U/L    Protein, total 6.3 6.3 - 8.2 g/dL    Albumin 2.6 (L) 3.2 - 4.6 g/dL    Globulin 3.7 (H) 2.3 - 3.5 g/dL    A-G Ratio 0.7 (L) 1.2 - 3.5     CBC WITH AUTOMATED DIFF    Collection Time: 11/30/21  8:07 AM   Result Value Ref Range    WBC 5.0 4.3 - 11.1 K/uL    RBC 5.26 4.23 - 5.6 M/uL    HGB 14.6 13.6 - 17.2 g/dL    HCT 43.7 41.1 - 50.3 %    MCV 83.1 79.6 - 97.8 FL    MCH 27.8 26.1 - 32.9 PG    MCHC 33.4 31.4 - 35.0 g/dL    RDW 14.1 11.9 - 14.6 %    PLATELET 352 (L) 968 - 450 K/uL    MPV 10.2 9.4 - 12.3 FL    ABSOLUTE NRBC 0.00 0.0 - 0.2 K/uL    DF AUTOMATED      NEUTROPHILS 75 43 - 78 %    LYMPHOCYTES 16 13 - 44 %    MONOCYTES 9 4.0 - 12.0 %    EOSINOPHILS 0 (L) 0.5 - 7.8 %    BASOPHILS 0 0.0 - 2.0 %    IMMATURE GRANULOCYTES 0 0.0 - 5.0 %    ABS. NEUTROPHILS 3.7 1.7 - 8.2 K/UL    ABS. LYMPHOCYTES 0.8 0.5 - 4.6 K/UL    ABS. MONOCYTES 0.5 0.1 - 1.3 K/UL    ABS. EOSINOPHILS 0.0 0.0 - 0.8 K/UL    ABS. BASOPHILS 0.0 0.0 - 0.2 K/UL    ABS. IMM.  GRANS. 0.0 0.0 - 0.5 K/UL   HEMOGLOBIN A1C WITH EAG    Collection Time: 11/30/21  8:07 AM   Result Value Ref Range    Hemoglobin A1c 5.9 4.20 - 6.30 %    Est. average glucose 123 mg/dL   C REACTIVE PROTEIN, QT    Collection Time: 11/30/21  8:07 AM   Result Value Ref Range    C-Reactive protein 4.2 (H) 0.0 - 0.9 mg/dL   GLUCOSE, POC    Collection Time: 11/30/21  8:35 AM   Result Value Ref Range    Glucose (POC) 104 (H) 65 - 100 mg/dL    Performed by Lloyd    GLUCOSE, POC    Collection Time: 11/30/21 12:27 PM   Result Value Ref Range    Glucose (POC) 140 (H) 65 - 100 mg/dL    Performed by Edilia    GLUCOSE, POC    Collection Time: 11/30/21  4:48 PM   Result Value Ref Range    Glucose (POC) 178 (H) 65 - 100 mg/dL    Performed by Edilia    GLUCOSE, POC    Collection Time: 11/30/21  8:41 PM   Result Value Ref Range    Glucose (POC) 139 (H) 65 - 100 mg/dL    Performed by Frieda    PLEASE READ & DOCUMENT PPD TEST IN 24 HRS    Collection Time: 11/30/21 11:27 PM   Result Value Ref Range    PPD Negative Negative    mm Induration 0 0 - 5 mm   GLUCOSE, POC    Collection Time: 12/01/21  7:56 AM   Result Value Ref Range    Glucose (POC) 88 65 - 100 mg/dL    Performed by Joesph    CBC WITH AUTOMATED DIFF    Collection Time: 12/01/21 10:00 AM   Result Value Ref Range    WBC 7.3 4.3 - 11.1 K/uL    RBC 5.50 4.23 - 5.6 M/uL    HGB 14.7 13.6 - 17.2 g/dL    HCT 46.3 41.1 - 50.3 %    MCV 84.2 79.6 - 97.8 FL    MCH 26.7 26.1 - 32.9 PG    MCHC 31.7 31.4 - 35.0 g/dL    RDW 14.1 11.9 - 14.6 %    PLATELET 516 512 - 666 K/uL    MPV 10.5 9.4 - 12.3 FL    ABSOLUTE NRBC 0.00 0.0 - 0.2 K/uL    DF AUTOMATED      NEUTROPHILS 86 (H) 43 - 78 %    LYMPHOCYTES 10 (L) 13 - 44 %    MONOCYTES 4 4.0 - 12.0 %    EOSINOPHILS 0 (L) 0.5 - 7.8 %    BASOPHILS 0 0.0 - 2.0 %    IMMATURE GRANULOCYTES 0 0.0 - 5.0 %    ABS. NEUTROPHILS 6.2 1.7 - 8.2 K/UL    ABS. LYMPHOCYTES 0.8 0.5 - 4.6 K/UL    ABS. MONOCYTES 0.3 0.1 - 1.3 K/UL    ABS. EOSINOPHILS 0.0 0.0 - 0.8 K/UL    ABS. BASOPHILS 0.0 0.0 - 0.2 K/UL    ABS. IMM.  GRANS. 0.0 0.0 - 0.5 K/UL   METABOLIC PANEL, COMPREHENSIVE    Collection Time: 12/01/21 10:00 AM   Result Value Ref Range    Sodium 139 138 - 145 mmol/L    Potassium 3.7 3.5 - 5.1 mmol/L    Chloride 104 98 - 107 mmol/L    CO2 27 21 - 32 mmol/L    Anion gap 8 7 - 16 mmol/L    Glucose 107 (H) 65 - 100 mg/dL BUN 21 8 - 23 MG/DL    Creatinine 1.05 0.8 - 1.5 MG/DL    GFR est AA >60 >60 ml/min/1.73m2    GFR est non-AA >60 >60 ml/min/1.73m2    Calcium 8.4 8.3 - 10.4 MG/DL    Bilirubin, total 0.5 0.2 - 1.1 MG/DL    ALT (SGPT) 47 12 - 65 U/L    AST (SGOT) 54 (H) 15 - 37 U/L    Alk. phosphatase 67 50 - 136 U/L    Protein, total 6.7 6.3 - 8.2 g/dL    Albumin 2.7 (L) 3.2 - 4.6 g/dL    Globulin 4.0 (H) 2.3 - 3.5 g/dL    A-G Ratio 0.7 (L) 1.2 - 3.5     MAGNESIUM    Collection Time: 12/01/21 10:00 AM   Result Value Ref Range    Magnesium 1.9 1.8 - 2.4 mg/dL   GLUCOSE, POC    Collection Time: 12/01/21 11:15 AM   Result Value Ref Range    Glucose (POC) 117 (H) 65 - 100 mg/dL    Performed by Joesph        All Micro Results     Procedure Component Value Units Date/Time    BLOOD CULTURE [[id number]] Collected: 11/29/21 2015    Order Status: Completed Specimen: Blood Updated: 12/01/21 0635     Special Requests: --        LEFT  FOREARM       Culture result: NO GROWTH 2 DAYS       BLOOD CULTURE [[id number]] Collected: 11/29/21 1408    Order Status: Completed Specimen: Blood Updated: 12/01/21 9272     Special Requests: --        LEFT  HAND       Culture result: NO GROWTH 2 DAYS             Other Studies:  No results found.     Current Meds:  Current Facility-Administered Medications   Medication Dose Route Frequency    baricitinib (OLUMIANT) tablet 4 mg  4 mg Oral DAILY    hydrocortisone (CORTAID) 1 % cream   Topical PRN    aspirin delayed-release tablet 81 mg  81 mg Oral QHS    atorvastatin (LIPITOR) tablet 40 mg  40 mg Oral DAILY    citalopram (CELEXA) tablet 20 mg  20 mg Oral DAILY    clonazePAM (KlonoPIN) tablet 0.5 mg  0.5 mg Oral Q8H PRN    pantoprazole (PROTONIX) tablet 40 mg  40 mg Oral ACB    tamsulosin (FLOMAX) capsule 0.4 mg  0.4 mg Oral DAILY    sodium chloride (NS) flush 5-40 mL  5-40 mL IntraVENous Q8H    sodium chloride (NS) flush 5-40 mL  5-40 mL IntraVENous PRN    acetaminophen (TYLENOL) tablet 650 mg 650 mg Oral Q6H PRN    Or    acetaminophen (TYLENOL) suppository 650 mg  650 mg Rectal Q6H PRN    polyethylene glycol (MIRALAX) packet 17 g  17 g Oral DAILY PRN    ondansetron (ZOFRAN ODT) tablet 4 mg  4 mg Oral Q8H PRN    Or    ondansetron (ZOFRAN) injection 4 mg  4 mg IntraVENous Q6H PRN    enoxaparin (LOVENOX) injection 40 mg  40 mg SubCUTAneous DAILY    dexamethasone (DECADRON) 10 mg/mL injection 6 mg  6 mg IntraVENous Q24H    cefTRIAXone (ROCEPHIN) 1 g in 0.9% sodium chloride (MBP/ADV) 50 mL MBP  1 g IntraVENous Q24H    insulin lispro (HUMALOG) injection   SubCUTAneous AC&HS    doxycycline (VIBRAMYCIN) capsule 100 mg  100 mg Oral Q12H     Labs, vital signs, diagnostic testing reviewed. Case discussed with patient, care team, Dr. Genie Khan. Signed:  Elvis Nuñez NP    Part of this note may have been written by using a voice dictation software. The note has been proof read but may still contain some grammatical/other typographical errors.

## 2021-12-01 NOTE — PROGRESS NOTES
Early in shift pt c/o \"burning\" at one electrode site on abdomen. Assessment found small, circular, pink area under the gel area of remote tele electrode sticker. Sticker was moved, area cleansed. Dr Estuardo Fitzgerald was alerted and orders received for 1% hydrocortisone topical cream. Cream was applied and follow up assessment found pt did have some relief. Approx 0130 called to pt room with c/o \"burning at multiple electrode sites. Dr Albina Baldwin alerted. Orders to d/c remote tele received. Electrodes removed, areas cleansed, and hydrocortisone cream applied. Areas of irritation appear to be the areas of gel contact with the skin, not the adhesive areas of the stickers. Pt now reports he has had a similar reaction to stickers when using a TENS unit in the past. He describes the sensation as \"hearing it sizzling. \"

## 2021-12-01 NOTE — PROGRESS NOTES
Problem: Airway Clearance - Ineffective  Goal: Achieve or maintain patent airway  Outcome: Progressing Towards Goal     Problem: Breathing Pattern - Ineffective  Goal: Ability to achieve and maintain a regular respiratory rate  Outcome: Progressing Towards Goal     Problem: Falls - Risk of  Goal: *Absence of Falls  Description: Document Bard  Fall Risk and appropriate interventions in the flowsheet.   Outcome: Progressing Towards Goal  Note: Fall Risk Interventions:            Medication Interventions: Patient to call before getting OOB

## 2021-12-02 PROBLEM — E66.09 CLASS 1 OBESITY DUE TO EXCESS CALORIES WITH SERIOUS COMORBIDITY AND BODY MASS INDEX (BMI) OF 32.0 TO 32.9 IN ADULT: Status: ACTIVE | Noted: 2021-01-01

## 2021-12-02 NOTE — PROGRESS NOTES
PT note: Spoke with RN this morning who suggests to hold therapy for today as pt oxygen demand has greatly increased overnight, now on max Airvo + NRB mask and unable to tolerate activity. Will check back at a later time/date as schedule allows and pt stable.      Romana Caraway, DPT

## 2021-12-02 NOTE — PROGRESS NOTES
Orders for Airvo received from Dr Elaine Hollingsworth. Pt to be placed on Airvo & continuous pulse ox. RT aware of new order.

## 2021-12-02 NOTE — PROGRESS NOTES
Pt administered 1mg Morphine ONCE as trial to help reduce air hunger. Pt stated morphine did make his breathing feel more comfortable. SpO2 check 90-92% on 15L HF NC + NRB. Trial with only 15L HF NC SpO2 = 87%. NRB placed back on pt.      Pt agreeable to use urinal and/or bedside commode in order to reduce ambulation while oxygenation is not ideal.

## 2021-12-02 NOTE — PROGRESS NOTES
Hospitalist Progress Note   Admit Date:  2021  3:01 PM   Name:  Shelly Garcia   Age:  69 y.o. Sex:  male  :  1950   MRN:  [id number]   Room:  [facility]    Presenting Complaint: Positive For Covid-19    Reason(s) for Admission: Acute respiratory failure with hypoxia [facility]) [J96.01]     Hospital Course & Interval History:   69 y. o. male with medical history of HTN, CAD, HLD, depression, BPH, obesity, and COVID-19 infection (21 at [facility]). Taking oral amoxil without improvement. Endorses SOB and presented for further evaluation. Patient has not been vaccinated. Hypoxic to 85% on RA and placed on supplemental O2 4 L NC with improvement. CXR shows left lung opacities. LEONCIO and hypotension present on admission now both resolved s/p 3L NS. Initiated Decadron  and Baricitinib . Currently requiring 7L HF NC to maintain O2 saturations. PT/OT evaluated with no needs identified. Subjective (21): Oxygen requirement greatly increased today now on nonrebreather with airvo at [place] pulmonology consulted. Is now agreeable to intubation that would prefer noninvasive methods.     Assessment & Plan:   * Acute respiratory failure with hypoxia (HCC)  -COVID-19 positive on 21 (test results visible under encounters)  -Hypoxic to 85% on RA requiring use of supplemental O2  -Procal < 0.05 but initiated on Rocephin and Doxycycline due to CXR findings, D3/5  -Decadron D 5/10  -CRP 6.9> 4.2>8.2  -Baricitinib initiated   -Prone positioning as tolerated, aggressive IS use, OOB to chair with all meals  -Mobilize as able, PT/OT evaluated with no needs identified  -Prone as able    : Now on max airvo with nonrebreather at 15 L, consult pulmonology    Class 1 obesity due to excess calories with serious comorbidity and body mass index (BMI) of 32.0 to 32.9 in adult  Increased risk of all cause mortality, complicating care  - healthy lifestyle at discharge    GERD (gastroesophageal reflux disease)  -Pantoprazole    CAD (coronary artery disease)  -asa, atorvastatin          Dispo/Discharge Planning:    Pending clinical improvement    Diet:  ADULT DIET Regular  DVT PPx: SCDs  Code status: Partial Code    Hospital Problems as of 12/2/2021 Date Reviewed: 12/2/2021          Codes Class Noted - Resolved POA    * (Principal) Acute respiratory failure with hypoxia [facility]) ICD-10-CM: J96.01  ICD-9-CM: 518.81  11/29/2021 - Present Yes        Class 1 obesity due to excess calories with serious comorbidity and body mass index (BMI) of 32.0 to 32.9 in adult ICD-10-CM: E66.09, Z68.32  ICD-9-CM: 278.00, V85.32  11/29/2021 - Present Yes        LEONCIO (acute kidney injury) ([facility] [address] ICD-10-CM: N17.9  ICD-9-CM: 584.9  11/29/2021 - Present Yes        Hypokalemia ICD-10-CM: E87.6  ICD-9-CM: 276.8  11/29/2021 - Present Yes        COVID-19 ICD-10-CM: U07.1  ICD-9-CM: 079.89  11/29/2021 - Present Yes        Hyperglycemia ICD-10-CM: R73.9  ICD-9-CM: 790.29  11/29/2021 - Present Yes        HLD (hyperlipidemia) ICD-10-CM: E78.5  ICD-9-CM: 272.4  12/19/2012 - Present Yes        HTN (hypertension) ICD-10-CM: I10  ICD-9-CM: 401.9  12/19/2012 - Present Yes        CAD (coronary artery disease) ICD-10-CM: I25.10  ICD-9-CM: 414.00  12/19/2012 - Present Yes        GERD (gastroesophageal reflux disease) ICD-10-CM: K21.9  ICD-9-CM: 530.81  12/19/2012 - Present Yes              Objective:     Patient Vitals for the past 24 hrs:   Temp Pulse Resp BP SpO2   12/02/21 1200 97.5 °F (36.4 °C) 80 17 (!) 152/77 90 %   12/02/21 0809 98.7 °F (37.1 °C) 74 18 (!) 147/76 94 %   12/02/21 0808 -- -- -- -- (!) 89 %   12/02/21 0338 -- -- -- -- 91 %   12/02/21 0258 98 °F (36.7 °C) 62 20 116/69 91 %   12/02/21 0125 -- -- -- -- (!) 87 %   12/01/21 2327 -- -- -- -- 92 %   12/01/21 2326 -- -- -- -- (!) 84 %   12/01/21 2313 97.6 °F (36.4 °C) (!) 54 21 124/84 (!) 88 %   12/01/21 2305 -- -- -- -- (!) 87 %   12/01/21 2255 -- -- -- -- (!) 85 %   12/01/21 2245 -- -- -- -- (!) 82 %   12/01/21 2024 -- (!) 55 -- -- 90 %   12/01/21 1959 98.1 °F (36.7 °C) (!) 50 21 (!) 113/58 (!) 88 %   12/01/21 1535 97.7 °F (36.5 °C) 63 22 112/60 91 %     Oxygen Therapy  O2 Sat (%): 90 % (12/02/21 1200)  Pulse via Oximetry: 72 beats per minute (12/02/21 0338)  O2 Device: Heated; Hi flow nasal cannula; Non-rebreather mask (12/02/21 549)  Skin Assessment: Clean, dry, & intact (12/02/21 1180)  Skin Protection for O2 Device: No (12/02/21 0338)  O2 Flow Rate (L/min): 60 l/min (12/02/21 0808)  FIO2 (%): 95 % (12/02/21 0808)    Estimated body mass index is 32.55 kg/m² as calculated from the following:    Height as of this encounter: 6' (1.829 m). Weight as of this encounter: 108.9 kg (240 lb). Intake/Output Summary (Last 24 hours) at 12/2/2021 1534  Last data filed at 12/2/2021 1530  Gross per 24 hour   Intake 500 ml   Output 600 ml   Net -100 ml       Blood pressure (!) 152/77, pulse 80, temperature 97.5 °F (36.4 °C), resp. rate 17, height 6' (1.829 m), weight 108.9 kg (240 lb), SpO2 90 %. Physical Exam  Vitals and nursing note reviewed. Constitutional:       General: He is not in acute distress. Appearance: Normal appearance. HENT:      Head: Normocephalic. Eyes:      Extraocular Movements: Extraocular movements intact. Cardiovascular:      Rate and Rhythm: Normal rate and regular rhythm. Pulses:           Radial pulses are 2+ on the left side. Pulmonary:      Effort: Pulmonary effort is normal. No respiratory distress. Breath sounds: Rhonchi present. No wheezing. Abdominal:      General: Abdomen is protuberant. There is no distension. Musculoskeletal:         General: No deformity. Cervical back: No rigidity. Skin:     General: Skin is warm and dry. Neurological:      General: No focal deficit present. Mental Status: He is alert. Psychiatric:         Mood and Affect: Mood is anxious. Affect is labile.          Behavior: Behavior normal.         Cognition and Memory: Cognition normal.         I have reviewed ordered lab tests and independently visualized imaging below:    Recent Labs:  Recent Results (from the past 48 hour(s))   GLUCOSE, POC    Collection Time: 11/30/21  4:48 PM   Result Value Ref Range    Glucose (POC) 178 (H) 65 - 100 mg/dL    Performed by Edilia    GLUCOSE, POC    Collection Time: 11/30/21  8:41 PM   Result Value Ref Range    Glucose (POC) 139 (H) 65 - 100 mg/dL    Performed by Frieda    PLEASE READ & DOCUMENT PPD TEST IN 24 HRS    Collection Time: 11/30/21 11:27 PM   Result Value Ref Range    PPD Negative Negative    mm Induration 0 0 - 5 mm   GLUCOSE, POC    Collection Time: 12/01/21  7:56 AM   Result Value Ref Range    Glucose (POC) 88 65 - 100 mg/dL    Performed by Joesph    CBC WITH AUTOMATED DIFF    Collection Time: 12/01/21 10:00 AM   Result Value Ref Range    WBC 7.3 4.3 - 11.1 K/uL    RBC 5.50 4.23 - 5.6 M/uL    HGB 14.7 13.6 - 17.2 g/dL    HCT 46.3 41.1 - 50.3 %    MCV 84.2 79.6 - 97.8 FL    MCH 26.7 26.1 - 32.9 PG    MCHC 31.7 31.4 - 35.0 g/dL    RDW 14.1 11.9 - 14.6 %    PLATELET 708 781 - 073 K/uL    MPV 10.5 9.4 - 12.3 FL    ABSOLUTE NRBC 0.00 0.0 - 0.2 K/uL    DF AUTOMATED      NEUTROPHILS 86 (H) 43 - 78 %    LYMPHOCYTES 10 (L) 13 - 44 %    MONOCYTES 4 4.0 - 12.0 %    EOSINOPHILS 0 (L) 0.5 - 7.8 %    BASOPHILS 0 0.0 - 2.0 %    IMMATURE GRANULOCYTES 0 0.0 - 5.0 %    ABS. NEUTROPHILS 6.2 1.7 - 8.2 K/UL    ABS. LYMPHOCYTES 0.8 0.5 - 4.6 K/UL    ABS. MONOCYTES 0.3 0.1 - 1.3 K/UL    ABS. EOSINOPHILS 0.0 0.0 - 0.8 K/UL    ABS. BASOPHILS 0.0 0.0 - 0.2 K/UL    ABS. IMM.  GRANS. 0.0 0.0 - 0.5 K/UL   METABOLIC PANEL, COMPREHENSIVE    Collection Time: 12/01/21 10:00 AM   Result Value Ref Range    Sodium 139 138 - 145 mmol/L    Potassium 3.7 3.5 - 5.1 mmol/L    Chloride 104 98 - 107 mmol/L    CO2 27 21 - 32 mmol/L    Anion gap 8 7 - 16 mmol/L    Glucose 107 (H) 65 - 100 mg/dL    BUN 21 8 - 23 MG/DL    Creatinine 1. 05 0.8 - 1.5 MG/DL    GFR est AA >60 >60 ml/min/1.73m2    GFR est non-AA >60 >60 ml/min/1.73m2    Calcium 8.4 8.3 - 10.4 MG/DL    Bilirubin, total 0.5 0.2 - 1.1 MG/DL    ALT (SGPT) 47 12 - 65 U/L    AST (SGOT) 54 (H) 15 - 37 U/L    Alk.  phosphatase 67 50 - 136 U/L    Protein, total 6.7 6.3 - 8.2 g/dL    Albumin 2.7 (L) 3.2 - 4.6 g/dL    Globulin 4.0 (H) 2.3 - 3.5 g/dL    A-G Ratio 0.7 (L) 1.2 - 3.5     MAGNESIUM    Collection Time: 12/01/21 10:00 AM   Result Value Ref Range    Magnesium 1.9 1.8 - 2.4 mg/dL   GLUCOSE, POC    Collection Time: 12/01/21 11:15 AM   Result Value Ref Range    Glucose (POC) 117 (H) 65 - 100 mg/dL    Performed by [facility]    GLUCOSE, POC    Collection Time: 12/01/21  4:27 PM   Result Value Ref Range    Glucose (POC) 132 (H) 65 - 100 mg/dL    Performed by [facility]    GLUCOSE, POC    Collection Time: 12/01/21  8:40 PM   Result Value Ref Range    Glucose (POC) 133 (H) 65 - 100 mg/dL    Performed by [facility]    PLEASE READ & DOCUMENT PPD TEST IN 48 HRS    Collection Time: 12/01/21 11:27 PM   Result Value Ref Range    PPD Negative Negative    mm Induration 0 0 - 5 mm   BLOOD GAS, ARTERIAL POC    Collection Time: 12/01/21 11:49 PM   Result Value Ref Range    Device: High Flow Nasal Cannula      FIO2 (POC) 100 %    pH (POC) 7.49 (H) 7.35 - 7.45      pCO2 (POC) 31.9 (L) 35 - 45 MMHG    pO2 (POC) 80 75 - 100 MMHG    HCO3 (POC) 24.5 22 - 26 MMOL/L    sO2 (POC) 96.9 95 - 98 %    Base excess (POC) 1.8 mmol/L    Allens test (POC) Positive      Site RIGHT RADIAL      Specimen type (POC) ARTERIAL      Performed by [facility]     Respiratory comment: 15l    GLUCOSE, POC    Collection Time: 12/02/21  7:35 AM   Result Value Ref Range    Glucose (POC) 99 65 - 100 mg/dL    Performed by Edilia    CBC WITH AUTOMATED DIFF    Collection Time: 12/02/21  8:03 AM   Result Value Ref Range    WBC 8.3 4.3 - 11.1 K/uL    RBC 5.17 4.23 - 5.6 M/uL    HGB 14.7 13.6 - 17.2 g/dL    HCT 44.0 41.1 - 50.3 %    MCV 85.1 79.6 - 97.8 FL    MCH 28.4 26.1 - 32.9 PG    MCHC 33.4 31.4 - 35.0 g/dL    RDW 14.1 11.9 - 14.6 %    PLATELET 325 (L) 038 - 450 K/uL    MPV 10.1 9.4 - 12.3 FL    ABSOLUTE NRBC 0.00 0.0 - 0.2 K/uL    DF AUTOMATED      NEUTROPHILS 87 (H) 43 - 78 %    LYMPHOCYTES 8 (L) 13 - 44 %    MONOCYTES 5 4.0 - 12.0 %    EOSINOPHILS 0 (L) 0.5 - 7.8 %    BASOPHILS 0 0.0 - 2.0 %    IMMATURE GRANULOCYTES 0 0.0 - 5.0 %    ABS. NEUTROPHILS 7.2 1.7 - 8.2 K/UL    ABS. LYMPHOCYTES 0.6 0.5 - 4.6 K/UL    ABS. MONOCYTES 0.4 0.1 - 1.3 K/UL    ABS. EOSINOPHILS 0.0 0.0 - 0.8 K/UL    ABS. BASOPHILS 0.0 0.0 - 0.2 K/UL    ABS. IMM. GRANS. 0.0 0.0 - 0.5 K/UL   METABOLIC PANEL, COMPREHENSIVE    Collection Time: 12/02/21  8:03 AM   Result Value Ref Range    Sodium 138 136 - 145 mmol/L    Potassium 3.6 3.5 - 5.1 mmol/L    Chloride 105 98 - 107 mmol/L    CO2 29 21 - 32 mmol/L    Anion gap 4 (L) 7 - 16 mmol/L    Glucose 99 65 - 100 mg/dL    BUN 21 8 - 23 MG/DL    Creatinine 0.95 0.8 - 1.5 MG/DL    GFR est AA >60 >60 ml/min/1.73m2    GFR est non-AA >60 >60 ml/min/1.73m2    Calcium 8.5 8.3 - 10.4 MG/DL    Bilirubin, total 0.8 0.2 - 1.1 MG/DL    ALT (SGPT) 52 12 - 65 U/L    AST (SGOT) 61 (H) 15 - 37 U/L    Alk.  phosphatase 66 50 - 136 U/L    Protein, total 6.4 6.3 - 8.2 g/dL    Albumin 2.5 (L) 3.2 - 4.6 g/dL    Globulin 3.9 (H) 2.3 - 3.5 g/dL    A-G Ratio 0.6 (L) 1.2 - 3.5     C REACTIVE PROTEIN, QT    Collection Time: 12/02/21  8:03 AM   Result Value Ref Range    C-Reactive protein 8.2 (H) 0.0 - 0.9 mg/dL   D DIMER    Collection Time: 12/02/21  8:03 AM   Result Value Ref Range    D DIMER 1.20 (H) <0.56 ug/ml(FEU)   NT-PRO BNP    Collection Time: 12/02/21  8:03 AM   Result Value Ref Range    NT pro-BNP 1,476 (H) 5 - 125 PG/ML   GLUCOSE, POC    Collection Time: 12/02/21 11:25 AM   Result Value Ref Range    Glucose (POC) 131 (H) 65 - 100 mg/dL    Performed by LeobardomiMagdalena        All Micro Results     Procedure Component Value Units Date/Time    BLOOD CULTURE [[id number]] Collected: 11/29/21 1408    Order Status: Completed Specimen: Blood Updated: 12/02/21 0610     Special Requests: --        LEFT  HAND       Culture result: NO GROWTH 3 DAYS       BLOOD CULTURE [[id number]] Collected: 11/29/21 2015    Order Status: Completed Specimen: Blood Updated: 12/02/21 0610     Special Requests: --        LEFT  FOREARM       Culture result: NO GROWTH 3 DAYS             Other Studies:  XR CHEST SNGL V    Result Date: 12/1/2021  EXAM: Chest x-ray. INDICATION: Dyspnea. Covid 19. COMPARISON: Prior chest x-ray on November 29, 2021. TECHNIQUE: Frontal view chest x-ray. FINDINGS: There are progressed patchy diffuse infiltrates throughout both lungs. The heart size is stable. No pneumothorax or pleural effusion is seen. Progressed diffuse bilateral lung infiltrates.       Current Meds:  Current Facility-Administered Medications   Medication Dose Route Frequency    HYDROcodone-acetaminophen (NORCO) 5-325 mg per tablet 1 Tablet  1 Tablet Oral Q6H PRN    lip protectant (BLISTEX) ointment 1 Each  1 Each Topical PRN    guaiFENesin ER (MUCINEX) tablet 600 mg  600 mg Oral Q12H    baricitinib (OLUMIANT) tablet 4 mg  4 mg Oral DAILY    hydrocortisone (CORTAID) 1 % cream   Topical PRN    aspirin delayed-release tablet 81 mg  81 mg Oral QHS    atorvastatin (LIPITOR) tablet 40 mg  40 mg Oral DAILY    citalopram (CELEXA) tablet 20 mg  20 mg Oral DAILY    clonazePAM (KlonoPIN) tablet 0.5 mg  0.5 mg Oral Q8H PRN    pantoprazole (PROTONIX) tablet 40 mg  40 mg Oral ACB    tamsulosin (FLOMAX) capsule 0.4 mg  0.4 mg Oral DAILY    sodium chloride (NS) flush 5-40 mL  5-40 mL IntraVENous Q8H    sodium chloride (NS) flush 5-40 mL  5-40 mL IntraVENous PRN    acetaminophen (TYLENOL) tablet 650 mg  650 mg Oral Q6H PRN    Or    acetaminophen (TYLENOL) suppository 650 mg  650 mg Rectal Q6H PRN    polyethylene glycol (MIRALAX) packet 17 g  17 g Oral DAILY PRN    ondansetron (ZOFRAN ODT) tablet 4 mg  4 mg Oral Q8H PRN    Or    ondansetron (ZOFRAN) injection 4 mg  4 mg IntraVENous Q6H PRN    enoxaparin (LOVENOX) injection 40 mg  40 mg SubCUTAneous DAILY    dexamethasone (DECADRON) 10 mg/mL injection 6 mg  6 mg IntraVENous Q24H    cefTRIAXone (ROCEPHIN) 1 g in 0.9% sodium chloride (MBP/ADV) 50 mL MBP  1 g IntraVENous Q24H    insulin lispro (HUMALOG) injection   SubCUTAneous AC&HS    doxycycline (VIBRAMYCIN) capsule 100 mg  100 mg Oral Q12H       Signed:  Chhaya Evans MD

## 2021-12-02 NOTE — PROGRESS NOTES
Telephoned this patient and after checking in with him a prayer was offered for him. The  received a call from the patient's nurse informing us that the patient's family requested a prayer be shared with the patient.     JESSA Mabry

## 2021-12-02 NOTE — ASSESSMENT & PLAN NOTE
-COVID-19 positive on 11/27/21 (test results visible under encounters)  -Hypoxic to 85% on RA requiring use of supplemental O2  -Procal < 0.05 but initiated on Rocephin and Doxycycline due to CXR findings, D4/5  -Decadron D 6/10  -CRP 6.9> 4.2>8.2  -Baricitinib initiated 11/30  -Prone positioning as tolerated, aggressive IS use, OOB to chair with all meals  -Mobilize as able, PT/OT evaluated with no needs identified  -Prone as able    12/3: Now on BiPAP and will likely transfer to ICU later today

## 2021-12-02 NOTE — PROGRESS NOTES
There is a high probability of acute organ impairment or life-threatening deterioration in the patient's condition from: Acute respiratory failure secondary to COVID-19 pneumonia  Critical care interventions: Airvo and nonrebreather at 15 L  Total critical care time spent: 38 minutes. Time is indicative of direct patient attendance at bedside and on the patient's floor nearby. Includes time spent at bedside performing history and exam, performing chart review, discussing findings and treatment plan with patient and/or family, discussing patient with consultants and colleagues, ordering and reviewing pertinent laboratory and radiographic evaluations, and discussing patient with nursing staff. Time excludes procedures.

## 2021-12-02 NOTE — PROGRESS NOTES
Pt remains on Airvo 60L 95% + NRB as placed by RT. Pt cooperative with use of bedside commode and urinal.     Pt endorses even small amount of exertion results in tachypnea/increased WOB and extended recovery time is needed. Pt currently resting in bed. Prone/side positioning is encouraged.

## 2021-12-02 NOTE — PROGRESS NOTES
S/p ambulation to bathroom pt SpO2 found to be in low 80's on 13L HF NC. HF rate increased to 15 lpm. Pt c/o \"having to work harder to breathe\" and \"feeling like lungs have water in them. \"    Pt initially concerned that humidity bottle for HF NC was causing \"water in his lungs. \" Education provided regarding O2 humidity. BBS= crackles throughout. Pt has strong, productive cough with sputum ranging from pink-red. Dr Geetha Dumont contacted via [address] [place] [address]. Stat CXR ordered. Radiology at bedside for CXR at this time.

## 2021-12-02 NOTE — CONSULTS
History and Physical Initial Visit NOTE           12/2/2021    Maria Elena Cooley                        Date of Admission:  11/29/2021    The patient's chart is reviewed and the patient is discussed with the staff. Subjective:     Patient is a 69 y.o.  male, PMH HTN, HLD, GERD, and CAD seen and evaluated at the request of Dr. Cassandra Carbone for worsening acute respiratory failure due to COVID 19. The patient presented to the ED on 11/29/21 with c/o mild fever, shortness of breath, and back pain. He was found to be COVID positive on 11/23/2021 after being tested at [facility].  The patient was noted to have an O2 sat of 85% on RA initially that improved on 4L NC. However, the patient has continued to have increased oxygen needs and is now on AirVo 60L/95% with O2 sat 89-90%. He states he does not really want to be intubated and placed on the ventilator if at all possible but will consider if he continues decompensating. He is not opposed to BIPAP if needed. The patient retested positive for COVID on 11/27/2021, baricitinib was initiated on 11/30/21, last dose 12/13/21; dexamethasone 6 mg IV started on 11/30/21, Vibramycin and ceftriaxone started on 11/29/21. He expresses desiring bronchoscopy if indicated. Review of Systems  A comprehensive review of systems was negative except for that written in the HPI. Prior to Admission Medications   Prescriptions Last Dose Informant Patient Reported? Taking?   amoxicillin (AMOXIL) 875 mg tablet   No No   Sig: Take 1 Tablet by mouth two (2) times a day for 10 days. aspirin delayed-release 81 mg tablet   Yes No   Sig: Take 81 mg by mouth nightly. atorvastatin (LIPITOR) 40 mg tablet   No No   Sig: Take 1 Tablet by mouth daily. citalopram (CELEXA) 20 mg tablet   No No   Sig: Take 1 Tab by mouth daily. clonazePAM (KlonoPIN) 1 mg tablet   No No   Sig: Take 1 Tablet by mouth three (3) times daily.  Max Daily Amount: 3 mg.   cyclobenzaprine (FLEXERIL) 10 mg tablet   No No   Sig: Take 1 Tab by mouth three (3) times daily as needed for Muscle Spasm(s). lisinopril-hydroCHLOROthiazide (PRINZIDE, ZESTORETIC) 10-12.5 mg per tablet   No No   Sig: Take 1 Tablet by mouth daily. omega-3 acid ethyl esters (Lovaza) 1 gram capsule   No No   Sig: Take 2 Caps by mouth two (2) times daily (with meals). omeprazole (PRILOSEC) 40 mg capsule   No No   Sig: Take 1 Cap by mouth daily. tamsulosin (FLOMAX) 0.4 mg capsule   No No   Sig: Take 1 Cap by mouth daily.       Facility-Administered Medications: None     Past Medical History:   Diagnosis Date    CAD (coronary artery disease) 12/19/2012    ED (erectile dysfunction) 12/19/2012    GERD (gastroesophageal reflux disease) 12/19/2012    managed by meds    HLD (hyperlipidemia) 12/19/2012    HTN (hypertension) 12/19/2012    managed by meds    Psychiatric disorder     managed by meds     Past Surgical History:   Procedure Laterality Date    HX COLONOSCOPY      and EGD    HX ENDOSCOPY      Biliary obstruction    HX HEART CATHETERIZATION      HX HERNIA REPAIR      umbilical     Social History     Socioeconomic History    Marital status:      Spouse name: Not on file    Number of children: Not on file    Years of education: Not on file    Highest education level: Not on file   Occupational History    Not on file   Tobacco Use    Smoking status: Never Smoker    Smokeless tobacco: Never Used   Vaping Use    Vaping Use: Never used   Substance and Sexual Activity    Alcohol use: No    Drug use: No    Sexual activity: Not on file   Other Topics Concern    Not on file   Social History Narrative    Not on file     Social Determinants of Health     Financial Resource Strain:     Difficulty of Paying Living Expenses: Not on file   Food Insecurity:     Worried About Running Out of Food in the Last Year: Not on file    Yvonne of Food in the Last Year: Not on file   Transportation Needs:     Lack of Transportation (Medical): Not on file    Lack of Transportation (Non-Medical):  Not on file   Physical Activity:     Days of Exercise per Week: Not on file    Minutes of Exercise per Session: Not on file   Stress:     Feeling of Stress : Not on file   Social Connections:     Frequency of Communication with Friends and Family: Not on file    Frequency of Social Gatherings with Friends and Family: Not on file    Attends Nondenominational Services: Not on file    Active Member of Clubs or Organizations: Not on file    Attends Club or Organization Meetings: Not on file    Marital Status: Not on file   Intimate Partner Violence:     Fear of Current or Ex-Partner: Not on file    Emotionally Abused: Not on file    Physically Abused: Not on file    Sexually Abused: Not on file   Housing Stability:     Unable to Pay for Housing in the Last Year: Not on file    Number of Places Lived in the Last Year: Not on file    Unstable Housing in the Last Year: Not on file     Family History   Problem Relation Age of Onset    Hypertension Mother     Cancer Father         Skin    Diabetes Other         Cousin     No Known Allergies  Current Facility-Administered Medications   Medication Dose Route Frequency    HYDROcodone-acetaminophen (NORCO) 5-325 mg per tablet 1 Tablet  1 Tablet Oral Q6H PRN    lip protectant (BLISTEX) ointment 1 Each  1 Each Topical PRN    guaiFENesin ER (MUCINEX) tablet 600 mg  600 mg Oral Q12H    baricitinib (OLUMIANT) tablet 4 mg  4 mg Oral DAILY    hydrocortisone (CORTAID) 1 % cream   Topical PRN    aspirin delayed-release tablet 81 mg  81 mg Oral QHS    atorvastatin (LIPITOR) tablet 40 mg  40 mg Oral DAILY    citalopram (CELEXA) tablet 20 mg  20 mg Oral DAILY    clonazePAM (KlonoPIN) tablet 0.5 mg  0.5 mg Oral Q8H PRN    pantoprazole (PROTONIX) tablet 40 mg  40 mg Oral ACB    tamsulosin (FLOMAX) capsule 0.4 mg  0.4 mg Oral DAILY    sodium chloride (NS) flush 5-40 mL  5-40 mL IntraVENous Q8H    sodium chloride (NS) flush 5-40 mL  5-40 mL IntraVENous PRN    acetaminophen (TYLENOL) tablet 650 mg  650 mg Oral Q6H PRN    Or    acetaminophen (TYLENOL) suppository 650 mg  650 mg Rectal Q6H PRN    polyethylene glycol (MIRALAX) packet 17 g  17 g Oral DAILY PRN    ondansetron (ZOFRAN ODT) tablet 4 mg  4 mg Oral Q8H PRN    Or    ondansetron (ZOFRAN) injection 4 mg  4 mg IntraVENous Q6H PRN    enoxaparin (LOVENOX) injection 40 mg  40 mg SubCUTAneous DAILY    dexamethasone (DECADRON) 10 mg/mL injection 6 mg  6 mg IntraVENous Q24H    cefTRIAXone (ROCEPHIN) 1 g in 0.9% sodium chloride (MBP/ADV) 50 mL MBP  1 g IntraVENous Q24H    insulin lispro (HUMALOG) injection   SubCUTAneous AC&HS    doxycycline (VIBRAMYCIN) capsule 100 mg  100 mg Oral Q12H     Objective:   Blood pressure (!) 152/77, pulse 80, temperature 97.5 °F (36.4 °C), resp. rate 17, height 6' (1.829 m), weight 240 lb (108.9 kg), SpO2 90 %. Intake/Output Summary (Last 24 hours) at 12/2/2021 1415  Last data filed at 12/2/2021 0809  Gross per 24 hour   Intake 500 ml   Output 300 ml   Net 200 ml     PHYSICAL EXAM   Constitutional:  the patient is well developed and in no acute distress  EENMT:  Sclera clear, pupils equal, oral mucosa moist  Respiratory: Diminished, on AirVo 60L-95%  Cardiovascular:  RRR without M,G,R  Gastrointestinal: soft and non-tender; with positive bowel sounds. Musculoskeletal: warm without cyanosis. There is trace lower extremity edema.   Skin:  no jaundice or rashes, no wounds   Neurologic: no gross neuro deficits     Psychiatric:  alert and oriented x 3, pleasant    CXR: 12/1--Progressive, diffuse infiltrates 11/29/21--left midlung opacities        Recent Labs     12/02/21  0803 12/01/21  1000 11/30/21  0807 11/29/21 2015   WBC 8.3 7.3 5.0  --    HGB 14.7 14.7 14.6  --    HCT 44.0 46.3 43.7  --    * 159 127*  --     139 138  --    K 3.6 3.7 3.6  --     104 107  --    CO2 29 27 24  --    GLU 99 107* 106*  --    BUN 21 21 20  --    CREA 0.95 1.05 1.02  --    MG  --  1.9  --  1.4*   CA 8.5 8.4 7.9*  --    ALB 2.5* 2.7* 2.6*  --    AST 61* 54* 41*  --    ALT 52 47 41  --    AP 66 67 58  --    BNPNT 1,476*  --   --   --    CRP 8.2*  --  4.2*  --      ECHO: No results found for this or any previous visit. Results     Procedure Component Value Units Date/Time    BLOOD CULTURE [[id number]] Collected: 11/29/21 2015    Order Status: Completed Specimen: Blood Updated: 12/02/21 0610     Special Requests: --        LEFT  FOREARM       Culture result: NO GROWTH 3 DAYS       BLOOD CULTURE [[id number]] Collected: 11/29/21 1408    Order Status: Completed Specimen: Blood Updated: 12/02/21 0610     Special Requests: --        LEFT  HAND       Culture result: NO GROWTH 3 DAYS           Inpat Anti-Infectives (From admission, onward)     Start     Ordered Stop    11/29/21 2000  cefTRIAXone (ROCEPHIN) 1 g in 0.9% sodium chloride (MBP/ADV) 50 mL MBP  1 g,   IntraVENous,   EVERY 24 HOURS         11/29/21 1938 12/04/21 1959 11/29/21 1750  doxycycline (VIBRAMYCIN) capsule 100 mg  100 mg,   Oral,   EVERY 12 HOURS         11/29/21 1749 12/04/21 2059              Assessment and Plan:  (Medical Decision Making)   Principal Problem:    Acute respiratory failure with hypoxia ([facility] [address] (11/29/2021)  --+COVID, pro BNP 1476.   May need BIPAP if continues to decompensate  --Could add albuterol inhaler as needed    Active Problems:    HLD (hyperlipidemia) (12/19/2012)  --Chronic, on atorvastatin      HTN (hypertension) (12/19/2012)  --Was on Lisinopril/HTCZ at home, not restarted here, SBP 110s-150s      CAD (coronary artery disease) (12/19/2012)  --Chronic      GERD (gastroesophageal reflux disease) (12/19/2012)  --On Protonix      Class 1 obesity due to excess calories with serious comorbidity and body mass index (BMI) of 32.0 to 32.9 in adult (11/29/2021)  --Chronic, Ongoing      LEONCIO (acute kidney injury) ([facility] [address] (11/29/2021)  --WNL today Hypokalemia (11/29/2021)  --K+ 3.6 today      COVID-19 (11/29/2021)  --Tested positive 11/27/2021, on droplet plus precautions  --baricitinib was initiated on 11/30/21, last dose 12/13/21; dexamethasone 6 mg IV started on 11/30/21, Vibramycin and ceftriaxone started on 11/29/21  --Self proning essential      DNI (do not intubate) (11/29/2021)  --Discussed with patient. Is agreeable to BIPAP if needed, does not want to be intubated but will accept if necessary. Hyperglycemia (11/29/2021)  --Per primary team     Partial Code    More than 50% of the time documented was spent in face-to-face contact with the patient and in the care of the patient on the floor/unit where the patient is located. Thank you very much for this referral.  We appreciate the opportunity to participate in this patient's care. Will follow along with above stated plan. Zak Frye NP     The patient has been seen and examined by me personally, the chart,labs, and radiographic studies have been reviewed. Chest: CTA- laboured breathing  Extremities: trace edema    I agree with the above assessment and plan.     Anibal Wagner MD.

## 2021-12-02 NOTE — PROGRESS NOTES
Pt currently SpO2 92% on 15L HF NC + NRB. CXR completed. Dr Panchito Yung contacted regarding results and increased O2 demand. ABG ordered. RT, Brenda Good, made aware of new order.

## 2021-12-02 NOTE — PROGRESS NOTES
Called to pt room to assist with temperature for comfort. While in room pt, unprompted, stated that he \"would want to be put on one of those other breathing machines if I needed it. \" This RN asked, for clarification purposes, if the pt meant he wanted to be intubated with a breathing tube and put on a ventilator for support if his respiratory status were to deteriorate to that point. The pt stated \"yes. \"    Dr Suleiman Etienne notified of this request via [employer] as pt is currently DNI status. Pt currently on Airvo. No respiratory distress noted at this time.

## 2021-12-03 NOTE — PROGRESS NOTES
Spoke with patient's wife, Dania King ([phone]), and provided her with a patient update. Primary RN, Cherelle Kate, aware of the above stated.

## 2021-12-03 NOTE — PROGRESS NOTES
ACUTE OT GOALS:  (Developed with and agreed upon by patient and/or caregiver.)  1) Patient will complete lower body bathing and dressing independently. 2) Patient will complete toileting independently. GOAL MET    3) Patient will complete functional transfers independently. GOAL MET   4) Patient will tolerate at least 30 minutes of OT activity with as needed rest breaks while maintaining O2 sats >90%. 5) Patient will verbalize at least 3 energy conservation technique to utilize during ADL/IADL. Timeframe: 7 visits     OCCUPATIONAL THERAPY: Daily Note OT Treatment Day # 2    Vicki Bowling is a 69 y.o. male   PRIMARY DIAGNOSIS: Acute respiratory failure with hypoxia (HCC)  Acute respiratory failure with hypoxia ([facility] [place] [address] [J96.01]       [place] / [place] / Product Type: Medicare /   ASSESSMENT:     REHAB RECOMMENDATIONS: CURRENT LEVEL OF FUNCTION:  (Most Recently Demonstrated)   Recommendation to date pending progress:  Setting:   TBD   Equipment:    To Be Determined Bathing:   Unable to perform  Dressing:   Unable to perform  Feeding/Grooming:   Unable to perform  Toileting:   Unable to perform  Functional Mobility:   Minimal Assistance     ASSESSMENT:  Mr. Mic Yuen has had a decline in respiratory function, now on Airvo 60L 90% + NRB mask and desatting to 80% with activity. Unable to tolerate much with PT/OT today, seems down/ sad. Sat edge of bed and took side steps with rest breaks and max cues for deep breathing. Discharge needs pending hospital course. SUBJECTIVE:   Mr. Mic Yuen states, \"I wish I felt better. \"    SOCIAL HISTORY/LIVING ENVIRONMENT: Lives with wife. Independent with all ADLs, IADLs.  Works as a   Home Environment: Private residence  # Steps to Enter: 2  One/Two Story Residence: One story  Living Alone: No  Support Systems: Spouse/Significant Other, Child(rohit)    OBJECTIVE:     PAIN: VITAL SIGNS: LINES/DRAINS:   Pre Treatment: Pain Screen  Pain Scale 1: Numeric (0 - 10)  Pain Onset 1: chronic  Pain Location 1: Back  Post Treatment: c/o chronic back pain from being in bed, offered to help atient to chair but he declined  Vital Signs  O2 Sat (%): (!) 85 %  O2 Device: Heated;  Hi flow nasal cannula (+ NRB mask )  O2 Flow Rate (L/min): 60 l/min  FIO2 (%): 90 % Continuous Pulse Oximetry and IV  O2 Device: Heated, Hi flow nasal cannula (+ NRB mask )     ACTIVITIES OF DAILY LIVING: I Mod I S SBA CGA Min Mod Max Total NT Comments   BASIC ADLs:              Bathing/ Showering [] [] [] [] [] [] [] [] [] []    Toileting [] [] [] [] [] [] [] [] [] []    Dressing [] [] [] [] [] [] [] [] [] []    Feeding [] [] [] [] [] [] [] [] [] []    Grooming [] [] [] [] [] [] [] [] [] []    Personal Device Care [] [] [] [] [] [] [] [] [] []    Functional Mobility [] [] [] [] [] [x] [] [] [] []    I=Independent, Mod I=Modified Independent, S=Supervision, SBA=Standby Assistance, CGA=Contact Guard Assistance,   Min=Minimal Assistance, Mod=Moderate Assistance, Max=Maximal Assistance, Total=Total Assistance, NT=Not Tested    MOBILITY: I Mod I S SBA CGA Min Mod Max Total  NT x2 Comments:   Supine to sit [] [] [] [] [x] [] [] [] [] [] []    Sit to supine [] [] [] [] [x] [] [] [] [] [] []    Sit to stand [] [] [] [] [] [x] [] [] [] [] []    Bed to chair [] [] [] [] [] [] [] [] [] [x] []    I=Independent, Mod I=Modified Independent, S=Supervision, SBA=Standby Assistance, CGA=Contact Guard Assistance,   Min=Minimal Assistance, Mod=Moderate Assistance, Max=Maximal Assistance, Total=Total Assistance, NT=Not Tested    BALANCE: Good Fair+ Fair Fair- Poor NT Comments   Sitting Static [x] [] [] [] [] []    Sitting Dynamic [x] [] [] [] [] []              Standing Static [] [] [x] [] [] []    Standing Dynamic [] [] [x] [] [] []      PLAN:   FREQUENCY/DURATION: OT Plan of Care: 3 times/week for duration of hospital stay or until stated goals are met, whichever comes first.    TREATMENT: TREATMENT:   ($$ Self Care/Home Management: 8-22 mins    )  Co-Treatment PT/OT necessary due to patient's decreased overall endurance/tolerance levels, as well as need for high level skilled assistance to complete functional transfers/mobility and functional tasks  Self Care (8 Minutes): Self care including Energy Conservation Training and deep breathing with activity  to increase independence and promote oxygenation in preparation for ADLs.     TREATMENT GRID:  N/A    AFTER TREATMENT POSITION/PRECAUTIONS:  Bed, Needs within reach and RN notified    INTERDISCIPLINARY COLLABORATION:  MD/PA/NP, RN/PCT, PT/PTA and OT/BURCIAGA    TOTAL TREATMENT DURATION:  OT Patient Time In/Time Out  Time In: 8853  Time Out: 1500 [facility] CHAZ HuntleyR/L

## 2021-12-03 NOTE — PROGRESS NOTES
Received bedside shift report from Paulina Max RN. Pt lying in bed. No apparent distress. Respirations even and unlabored on BiPap 90%. Instructed to call for assistance with needs, as they arise. Pt voiced understanding.

## 2021-12-03 NOTE — PROGRESS NOTES
TRANSFER - OUT REPORT:    Verbal report given to Mariel Melvin on Dania Boone  being transferred to  21 06 for urgent transfer       Report consisted of patients Situation, Background, Assessment and   Recommendations(SBAR). Information from the following report(s) SBAR, Kardex and Recent Results was reviewed with the receiving nurse. Lines:   Peripheral IV 11/29/21 Right Antecubital (Active)   Site Assessment Clean, dry, & intact 12/03/21 0806   Phlebitis Assessment 0 12/03/21 0806   Infiltration Assessment 0 12/03/21 0806   Dressing Status Clean, dry, & intact 12/03/21 0806   Dressing Type Tape; Transparent 12/03/21 0806   Hub Color/Line Status Flushed 12/03/21 0806   Alcohol Cap Used No 12/03/21 0806       Peripheral IV 11/29/21 Right Hand (Active)   Site Assessment Clean, dry, & intact 12/03/21 0806   Phlebitis Assessment 0 12/03/21 0806   Infiltration Assessment 0 12/03/21 0806   Dressing Status Clean, dry, & intact 12/03/21 0806   Dressing Type Tape; Transparent 12/03/21 0806   Hub Color/Line Status Flushed 12/03/21 0806   Alcohol Cap Used No 12/03/21 0806        Opportunity for questions and clarification was provided.       Patient transported with:   Registered Nurse and RRT

## 2021-12-03 NOTE — PROGRESS NOTES
Notified physician via [address] [place] [address] that patient is not maintaining  saturation above 89% with both hi flow and  NRB. Respiratory was called and patient was placed on BiPAP  With oxygen saturation now 91-93% on BiPAP. Patient will continue to be assisted and monitored as needed.

## 2021-12-03 NOTE — PROGRESS NOTES
Schuyler Olguin  Admission Date: 11/29/2021         Daily Progress Note: 12/3/2021    The patient's chart is reviewed and the patient is discussed with the staff. Background: 69 y.o.  male, PMH HTN, HLD, GERD, and CAD seen and evaluated at the request of Dr. Ajit Esparza for worsening acute respiratory failure due to COVID 19. The patient presented to the ED on 11/29/21 with c/o mild fever, shortness of breath, and back pain. He was found to be COVID positive on 11/23/2021 after being tested at [facility].  The patient was noted to have an O2 sat of 85% on RA initially that improved on 4L NC. However, the patient has continued to have increased oxygen needs and is now on AirVo 60L/95% with O2 sat 89-90%. He states he does not really want to be intubated and placed on the ventilator if at all possible but will consider if he continues decompensating. He is not opposed to BIPAP if needed. The patient retested positive for COVID on 11/27/2021, baricitinib was initiated on 11/30/21, last dose 12/13/21; dexamethasone 6 mg IV started on 11/30/21, Vibramycin and ceftriaxone started on 11/29/21. He expresses desiring bronchoscopy if indicated.     Subjective:     On 15L NRB mask and AirVo 60L/90%  Feels like he needs something to help him relax  Up to side of bed to work with PT    Current Facility-Administered Medications   Medication Dose Route Frequency    albuterol (PROVENTIL HFA, VENTOLIN HFA, PROAIR HFA) inhaler 2 Puff  2 Puff Inhalation Q4H PRN    HYDROcodone-acetaminophen (NORCO) 5-325 mg per tablet 1 Tablet  1 Tablet Oral Q6H PRN    lip protectant (BLISTEX) ointment 1 Each  1 Each Topical PRN    lisinopril-hydroCHLOROthiazide (PRINZIDE, ZESTORETIC) 10-12.5 mg per tablet 1 Tablet  1 Tablet Oral DAILY    guaiFENesin ER (MUCINEX) tablet 600 mg  600 mg Oral Q12H    baricitinib (OLUMIANT) tablet 4 mg  4 mg Oral DAILY    hydrocortisone (CORTAID) 1 % cream   Topical PRN    [Held by provider] aspirin delayed-release tablet 81 mg  81 mg Oral QHS    atorvastatin (LIPITOR) tablet 40 mg  40 mg Oral DAILY    citalopram (CELEXA) tablet 20 mg  20 mg Oral DAILY    clonazePAM (KlonoPIN) tablet 0.5 mg  0.5 mg Oral Q8H PRN    pantoprazole (PROTONIX) tablet 40 mg  40 mg Oral ACB    tamsulosin (FLOMAX) capsule 0.4 mg  0.4 mg Oral DAILY    sodium chloride (NS) flush 5-40 mL  5-40 mL IntraVENous Q8H    sodium chloride (NS) flush 5-40 mL  5-40 mL IntraVENous PRN    acetaminophen (TYLENOL) tablet 650 mg  650 mg Oral Q6H PRN    Or    acetaminophen (TYLENOL) suppository 650 mg  650 mg Rectal Q6H PRN    polyethylene glycol (MIRALAX) packet 17 g  17 g Oral DAILY PRN    ondansetron (ZOFRAN ODT) tablet 4 mg  4 mg Oral Q8H PRN    Or    ondansetron (ZOFRAN) injection 4 mg  4 mg IntraVENous Q6H PRN    [Held by provider] enoxaparin (LOVENOX) injection 40 mg  40 mg SubCUTAneous DAILY    dexamethasone (DECADRON) 10 mg/mL injection 6 mg  6 mg IntraVENous Q24H    cefTRIAXone (ROCEPHIN) 1 g in 0.9% sodium chloride (MBP/ADV) 50 mL MBP  1 g IntraVENous Q24H    insulin lispro (HUMALOG) injection   SubCUTAneous AC&HS    doxycycline (VIBRAMYCIN) capsule 100 mg  100 mg Oral Q12H     Review of Systems    Constitutional: negative for fever, chills, sweats  Cardiovascular: negative for chest pain, palpitations, syncope, edema  Gastrointestinal:  negative for dysphagia, reflux, vomiting, diarrhea, abdominal pain, or melena  Neurologic:  negative for focal weakness, numbness, headache  Objective:     Vitals:    12/03/21 0045 12/03/21 0334 12/03/21 0419 12/03/21 0803   BP:  (!) 148/83  (!) 165/87   Pulse:  78  74   Resp:  20  (!) 31   Temp:  98 °F (36.7 °C)  97.9 °F (36.6 °C)   SpO2: 90% 90% 91% 96%   Weight:       Height:           Intake/Output Summary (Last 24 hours) at 12/3/2021 0949  Last data filed at 12/2/2021 1714  Gross per 24 hour   Intake 0 ml   Output 300 ml   Net -300 ml     Physical Exam:   Constitution: the patient is well developed and in no acute distress  HEENT:  Sclera clear, pupils equal, oral mucosa moist  Respiratory: On 15L NRB and AirVo 60L-90%  Cardiovascular:  RRR without M,G,R  Gastrointestinal: soft and non-tender; with positive bowel sounds. Musculoskeletal: warm without cyanosis. There is trace lower extremity edema. Skin:  no jaundice or rashes, no wounds   Neurologic: no gross neuro deficits     Psychiatric:  alert and oriented x 3    CXR:  None today  CXR: 12/1--Progressive, diffuse infiltrates       11/29/21--left midlung opacities            LAB:  Recent Labs     12/02/21  0803 12/01/21  1000   WBC 8.3 7.3   HGB 14.7 14.7   HCT 44.0 46.3   * 159     Recent Labs     12/02/21  0803 12/01/21  1000    139   K 3.6 3.7    104   CO2 29 27   GLU 99 107*   BUN 21 21   CREA 0.95 1.05   MG  --  1.9   CA 8.5 8.4   ALB 2.5* 2.7*   AST 61* 54*   ALT 52 47   AP 66 67     Recent Labs     12/02/21  0803   BNPNT 1,476*   CRP 8.2*     Recent Labs     12/01/21  2349   PHI 7.49*   PCO2I 31.9*   PO2I 80   HCO3I 24.5     No results for input(s): SDES, CULT in the last 72 hours. Assessment and Plan:  (Medical Decision Making)   Principal Problem:    Acute respiratory failure with hypoxia ([facility] [address] (11/29/2021)  --+COVID, pro BNP 1476.   Currently on AirVo 60L/90% and NRB 15L with O2 sats 86-89%, high risk for decompensation  --Has albuterol inhaler as needed  --On Celexa 20 mg daily, consider increasing dose or adding something additional for anxiety and depression    Active Problems:    HLD (hyperlipidemia) (12/19/2012)  --Chronic, on atorvastatin      HTN (hypertension) (12/19/2012)  --Chronic, was on lisinopril/HCTZ at home, has been restarted here      CAD (coronary artery disease) (12/19/2012)  --Chronic      GERD (gastroesophageal reflux disease) (12/19/2012)  --On Protonix      Class 1 obesity due to excess calories with serious comorbidity and body mass index (BMI) of 32.0 to 32.9 in adult (11/29/2021)  --Chronic      LEONCIO (acute kidney injury) ([facility] [address] (11/29/2021)  --WNL      Hypokalemia (11/29/2021)  --WNL      COVID-19 (11/29/2021)  --Tested positive 11/27/2021, on droplet plus precautions  --baricitinib was initiated on 11/30/21, last dose 12/13/21; dexamethasone 6 mg IV started on 11/30/21, Vibramycin and ceftriaxone started on 11/29/21  --Self proning essential      Hyperglycemia (11/29/2021)  --Per primary team      More than 50% of the time documented was spent in face-to-face contact with the patient and in the care of the patient on the floor/unit where the patient is located. Doris Claros NP   I have spoken with and examined the patient. I agree with the above assessment and plan as documented.     Gen: alert  Lungs:  Crackles bilateral  Heart:  RRR with no Murmur/Rubs/Gallops  Abd:soft  Ext: no edema    Now requiring 100% bipap-desats on airvo- will probably need to go to icu- will contact them    Violet Armendariz MD

## 2021-12-03 NOTE — PROGRESS NOTES
Hospitalist Progress Note   Admit Date:  2021  3:01 PM   Name:  Tien Bragg   Age:  69 y.o. Sex:  male  :  1950   MRN:  [id number]   Room:  [facility]/    Presenting Complaint: Positive For Covid-19    Reason(s) for Admission: Acute respiratory failure with hypoxia [facility]) [J96.01]     Hospital Course & Interval History:   70M PMHx HTN, CAD, HLD, depression, BPH, obesity, and COVID-19 infection (21 at [facility]). Was taking oral amoxil without improvement. Endorsed SOB and presented for further evaluation . Patient was not vaccinated. Hypoxic to 85% on RA and placed on supplemental O2 4 L NC with improvement. CXR shows left lung opacities. LEONCIO and hypotension present on admission now both resolved s/p 3L NS. Initiated Decadron  and Baricitinib . Was requiring 7L HF NC to maintain O2 saturations. PT/OT evaluated with no needs identified. On  required airvo. On 12/3 required BiPAP. Expect transfer to ICU on 12/3. Subjective (21): Oxygen requirement again increased. Discussed with pulmonology, we agree he is probably can require transfer to ICU. Complaining of back pain, anxiety and continuing shortness of breath today.     Assessment & Plan:   * Acute respiratory failure with hypoxia (HCC)  -COVID-19 positive on 21 (test results visible under encounters)  -Hypoxic to 85% on RA requiring use of supplemental O2  -Procal < 0.05 but initiated on Rocephin and Doxycycline due to CXR findings, D4/5  -Decadron D 6/10  -CRP 6.9> 4.2>8.2  -Baricitinib initiated   -Prone positioning as tolerated, aggressive IS use, OOB to chair with all meals  -Mobilize as able, PT/OT evaluated with no needs identified  -Prone as able    12/3: Now on BiPAP and will likely transfer to ICU later today    Class 1 obesity due to excess calories with serious comorbidity and body mass index (BMI) of 32.0 to 32.9 in adult  Increased risk of all cause mortality, complicating care  - healthy lifestyle at discharge    GERD (gastroesophageal reflux disease)  -Pantoprazole    CAD (coronary artery disease)  -asa, atorvastatin    There is a high probability of acute organ impairment or life-threatening deterioration in the patient's condition from: Acute respiratory failure due to COVID  Critical care interventions: BiPAP, transfer to ICU, discussion with neurology  Total critical care time spent: 44 minutes. Time is indicative of direct patient attendance at bedside and on the patient's floor nearby. Includes time spent at bedside performing history and exam, performing chart review, discussing findings and treatment plan with patient and/or family, discussing patient with consultants and colleagues, ordering and reviewing pertinent laboratory and radiographic evaluations, and discussing patient with nursing staff. Time excludes procedures.       Dispo/Discharge Planning:    Pending clinical improvement    Diet:  ADULT DIET Regular  DVT PPx: SCDs  Code status: Full Code    Hospital Problems as of 12/3/2021 Date Reviewed: 12/2/2021          Codes Class Noted - Resolved POA    * (Principal) Acute respiratory failure with hypoxia [facility]) ICD-10-CM: J96.01  ICD-9-CM: 518.81  11/29/2021 - Present Yes        Class 1 obesity due to excess calories with serious comorbidity and body mass index (BMI) of 32.0 to 32.9 in adult ICD-10-CM: E66.09, Z68.32  ICD-9-CM: 278.00, V85.32  11/29/2021 - Present Yes        LEONCIO (acute kidney injury) ([facility] [address] ICD-10-CM: N17.9  ICD-9-CM: 584.9  11/29/2021 - Present Yes        Hypokalemia ICD-10-CM: E87.6  ICD-9-CM: 276.8  11/29/2021 - Present Yes        COVID-19 ICD-10-CM: U07.1  ICD-9-CM: 079.89  11/29/2021 - Present Yes        Hyperglycemia ICD-10-CM: R73.9  ICD-9-CM: 790.29  11/29/2021 - Present Yes        HLD (hyperlipidemia) ICD-10-CM: E78.5  ICD-9-CM: 272.4  12/19/2012 - Present Yes        HTN (hypertension) ICD-10-CM: I10  ICD-9-CM: 401.9  12/19/2012 - Present Yes CAD (coronary artery disease) ICD-10-CM: I25.10  ICD-9-CM: 414.00  12/19/2012 - Present Yes        GERD (gastroesophageal reflux disease) ICD-10-CM: K21.9  ICD-9-CM: 530.81  12/19/2012 - Present Yes              Objective:     Patient Vitals for the past 24 hrs:   Temp Pulse Resp BP SpO2   12/03/21 1303 97.5 °F (36.4 °C) -- -- -- --   12/03/21 1111 97.6 °F (36.4 °C) 77 (!) 32 (!) 158/89 95 %   12/03/21 0953 -- -- -- -- (!) 85 %   12/03/21 0803 97.9 °F (36.6 °C) 74 (!) 31 (!) 165/87 96 %   12/03/21 0419 -- -- -- -- 91 %   12/03/21 0334 98 °F (36.7 °C) 78 20 (!) 148/83 90 %   12/03/21 0045 -- -- -- -- 90 %   12/02/21 2310 97.7 °F (36.5 °C) 71 20 (!) 150/85 (!) 89 %   12/02/21 2006 97.7 °F (36.5 °C) 68 20 (!) 142/76 90 %   12/02/21 1729 -- -- -- -- 90 %   12/02/21 1557 97.3 °F (36.3 °C) 86 20 122/77 90 %     Oxygen Therapy  O2 Sat (%): 95 % (12/03/21 1111)  Pulse via Oximetry: 72 beats per minute (12/03/21 0419)  O2 Device: Heated; Hi flow nasal cannula (+ NRB mask ) (12/03/21 0953)  Skin Assessment: Clean, dry, & intact (12/02/21 1948)  Skin Protection for O2 Device: No (12/02/21 1948)  O2 Flow Rate (L/min): 60 l/min (12/03/21 0953)  FIO2 (%): 90 % (12/03/21 0953)    Estimated body mass index is 32.55 kg/m² as calculated from the following:    Height as of this encounter: 6' (1.829 m). Weight as of this encounter: 108.9 kg (240 lb). Intake/Output Summary (Last 24 hours) at 12/3/2021 1327  Last data filed at 12/2/2021 1714  Gross per 24 hour   Intake 0 ml   Output 300 ml   Net -300 ml       Blood pressure (!) 158/89, pulse 77, temperature 97.5 °F (36.4 °C), resp. rate (!) 32, height 6' (1.829 m), weight 108.9 kg (240 lb), SpO2 95 %. Physical Exam  Vitals and nursing note reviewed. Constitutional:       General: He is not in acute distress. Appearance: Normal appearance. HENT:      Head: Normocephalic. Eyes:      Extraocular Movements: Extraocular movements intact.    Cardiovascular:      Rate and Rhythm: Normal rate and regular rhythm. Pulses:           Radial pulses are 2+ on the left side. Pulmonary:      Effort: Pulmonary effort is normal. No respiratory distress. Breath sounds: Rhonchi present. No wheezing. Abdominal:      General: Abdomen is protuberant. There is no distension. Musculoskeletal:         General: No deformity. Cervical back: No rigidity. Skin:     General: Skin is warm and dry. Neurological:      General: No focal deficit present. Mental Status: He is alert. Psychiatric:         Mood and Affect: Mood is anxious. Affect is not labile.          Behavior: Behavior normal.         Cognition and Memory: Cognition normal.         Judgment: Judgment normal.         I have reviewed ordered lab tests and independently visualized imaging below:    Recent Labs:  Recent Results (from the past 48 hour(s))   GLUCOSE, POC    Collection Time: 12/01/21  4:27 PM   Result Value Ref Range    Glucose (POC) 132 (H) 65 - 100 mg/dL    Performed by Joesph    GLUCOSE, POC    Collection Time: 12/01/21  8:40 PM   Result Value Ref Range    Glucose (POC) 133 (H) 65 - 100 mg/dL    Performed by Frieda    PLEASE READ & DOCUMENT PPD TEST IN 48 HRS    Collection Time: 12/01/21 11:27 PM   Result Value Ref Range    PPD Negative Negative    mm Induration 0 0 - 5 mm   BLOOD GAS, ARTERIAL POC    Collection Time: 12/01/21 11:49 PM   Result Value Ref Range    Device: High Flow Nasal Cannula      FIO2 (POC) 100 %    pH (POC) 7.49 (H) 7.35 - 7.45      pCO2 (POC) 31.9 (L) 35 - 45 MMHG    pO2 (POC) 80 75 - 100 MMHG    HCO3 (POC) 24.5 22 - 26 MMOL/L    sO2 (POC) 96.9 95 - 98 %    Base excess (POC) 1.8 mmol/L    Allens test (POC) Positive      Site RIGHT RADIAL      Specimen type (POC) ARTERIAL      Performed by George     Respiratory comment: 15l    GLUCOSE, POC    Collection Time: 12/02/21  7:35 AM   Result Value Ref Range    Glucose (POC) 99 65 - 100 mg/dL    Performed by [facility]    CBC WITH AUTOMATED DIFF    Collection Time: 12/02/21  8:03 AM   Result Value Ref Range    WBC 8.3 4.3 - 11.1 K/uL    RBC 5.17 4.23 - 5.6 M/uL    HGB 14.7 13.6 - 17.2 g/dL    HCT 44.0 41.1 - 50.3 %    MCV 85.1 79.6 - 97.8 FL    MCH 28.4 26.1 - 32.9 PG    MCHC 33.4 31.4 - 35.0 g/dL    RDW 14.1 11.9 - 14.6 %    PLATELET 790 (L) 015 - 450 K/uL    MPV 10.1 9.4 - 12.3 FL    ABSOLUTE NRBC 0.00 0.0 - 0.2 K/uL    DF AUTOMATED      NEUTROPHILS 87 (H) 43 - 78 %    LYMPHOCYTES 8 (L) 13 - 44 %    MONOCYTES 5 4.0 - 12.0 %    EOSINOPHILS 0 (L) 0.5 - 7.8 %    BASOPHILS 0 0.0 - 2.0 %    IMMATURE GRANULOCYTES 0 0.0 - 5.0 %    ABS. NEUTROPHILS 7.2 1.7 - 8.2 K/UL    ABS. LYMPHOCYTES 0.6 0.5 - 4.6 K/UL    ABS. MONOCYTES 0.4 0.1 - 1.3 K/UL    ABS. EOSINOPHILS 0.0 0.0 - 0.8 K/UL    ABS. BASOPHILS 0.0 0.0 - 0.2 K/UL    ABS. IMM. GRANS. 0.0 0.0 - 0.5 K/UL   METABOLIC PANEL, COMPREHENSIVE    Collection Time: 12/02/21  8:03 AM   Result Value Ref Range    Sodium 138 136 - 145 mmol/L    Potassium 3.6 3.5 - 5.1 mmol/L    Chloride 105 98 - 107 mmol/L    CO2 29 21 - 32 mmol/L    Anion gap 4 (L) 7 - 16 mmol/L    Glucose 99 65 - 100 mg/dL    BUN 21 8 - 23 MG/DL    Creatinine 0.95 0.8 - 1.5 MG/DL    GFR est AA >60 >60 ml/min/1.73m2    GFR est non-AA >60 >60 ml/min/1.73m2    Calcium 8.5 8.3 - 10.4 MG/DL    Bilirubin, total 0.8 0.2 - 1.1 MG/DL    ALT (SGPT) 52 12 - 65 U/L    AST (SGOT) 61 (H) 15 - 37 U/L    Alk.  phosphatase 66 50 - 136 U/L    Protein, total 6.4 6.3 - 8.2 g/dL    Albumin 2.5 (L) 3.2 - 4.6 g/dL    Globulin 3.9 (H) 2.3 - 3.5 g/dL    A-G Ratio 0.6 (L) 1.2 - 3.5     C REACTIVE PROTEIN, QT    Collection Time: 12/02/21  8:03 AM   Result Value Ref Range    C-Reactive protein 8.2 (H) 0.0 - 0.9 mg/dL   D DIMER    Collection Time: 12/02/21  8:03 AM   Result Value Ref Range    D DIMER 1.20 (H) <0.56 ug/ml(FEU)   NT-PRO BNP    Collection Time: 12/02/21  8:03 AM   Result Value Ref Range    NT pro-BNP 1,476 (H) 5 - 125 PG/ML   GLUCOSE, POC    Collection Time: 12/02/21 11:25 AM   Result Value Ref Range    Glucose (POC) 131 (H) 65 - 100 mg/dL    Performed by Edilia    GLUCOSE, POC    Collection Time: 12/02/21  3:52 PM   Result Value Ref Range    Glucose (POC) 155 (H) 65 - 100 mg/dL    Performed by Edilia    GLUCOSE, POC    Collection Time: 12/02/21  8:42 PM   Result Value Ref Range    Glucose (POC) 132 (H) 65 - 100 mg/dL    Performed by Kurt    GLUCOSE, POC    Collection Time: 12/03/21  7:22 AM   Result Value Ref Range    Glucose (POC) 114 (H) 65 - 100 mg/dL    Performed by Hollis    CBC WITH AUTOMATED DIFF    Collection Time: 12/03/21  8:38 AM   Result Value Ref Range    WBC 9.1 4.3 - 11.1 K/uL    RBC 5.08 4.23 - 5.6 M/uL    HGB 14.3 13.6 - 17.2 g/dL    HCT 42.7 41.1 - 50.3 %    MCV 84.1 79.6 - 97.8 FL    MCH 28.1 26.1 - 32.9 PG    MCHC 33.5 31.4 - 35.0 g/dL    RDW 14.2 11.9 - 14.6 %    PLATELET 397 684 - 865 K/uL    MPV 9.9 9.4 - 12.3 FL    ABSOLUTE NRBC 0.00 0.0 - 0.2 K/uL    DF AUTOMATED      NEUTROPHILS 88 (H) 43 - 78 %    LYMPHOCYTES 6 (L) 13 - 44 %    MONOCYTES 6 4.0 - 12.0 %    EOSINOPHILS 0 (L) 0.5 - 7.8 %    BASOPHILS 0 0.0 - 2.0 %    IMMATURE GRANULOCYTES 1 0.0 - 5.0 %    ABS. NEUTROPHILS 8.0 1.7 - 8.2 K/UL    ABS. LYMPHOCYTES 0.6 0.5 - 4.6 K/UL    ABS. MONOCYTES 0.5 0.1 - 1.3 K/UL    ABS. EOSINOPHILS 0.0 0.0 - 0.8 K/UL    ABS. BASOPHILS 0.0 0.0 - 0.2 K/UL    ABS. IMM.  GRANS. 0.1 0.0 - 0.5 K/UL   METABOLIC PANEL, COMPREHENSIVE    Collection Time: 12/03/21  8:38 AM   Result Value Ref Range    Sodium 138 138 - 145 mmol/L    Potassium 3.5 3.5 - 5.1 mmol/L    Chloride 103 98 - 107 mmol/L    CO2 29 21 - 32 mmol/L    Anion gap 6 (L) 7 - 16 mmol/L    Glucose 113 (H) 65 - 100 mg/dL    BUN 17 8 - 23 MG/DL    Creatinine 0.80 0.8 - 1.5 MG/DL    GFR est AA >60 >60 ml/min/1.73m2    GFR est non-AA >60 >60 ml/min/1.73m2    Calcium 8.5 8.3 - 10.4 MG/DL    Bilirubin, total 0.9 0.2 - 1.1 MG/DL    ALT (SGPT) 52 12 - 65 U/L    AST (SGOT) 63 (H) 15 - 37 U/L    Alk. phosphatase 72 50 - 136 U/L    Protein, total 6.4 6.3 - 8.2 g/dL    Albumin 2.4 (L) 3.2 - 4.6 g/dL    Globulin 4.0 (H) 2.3 - 3.5 g/dL    A-G Ratio 0.6 (L) 1.2 - 3.5     GLUCOSE, POC    Collection Time: 12/03/21 12:01 PM   Result Value Ref Range    Glucose (POC) 137 (H) 65 - 100 mg/dL    Performed by [facility]        All Micro Results     Procedure Component Value Units Date/Time    BLOOD CULTURE [[id number]] Collected: 11/29/21 1408    Order Status: Completed Specimen: Blood Updated: 12/03/21 0621     Special Requests: --        LEFT  HAND       Culture result: NO GROWTH 4 DAYS       BLOOD CULTURE [[id number]] Collected: 11/29/21 2015    Order Status: Completed Specimen: Blood Updated: 12/03/21 1511     Special Requests: --        LEFT  FOREARM       Culture result: NO GROWTH 4 DAYS             Other Studies:  No results found.     Current Meds:  Current Facility-Administered Medications   Medication Dose Route Frequency    albuterol (PROVENTIL HFA, VENTOLIN HFA, PROAIR HFA) inhaler 2 Puff  2 Puff Inhalation Q4H PRN    [START ON 12/4/2021] dexAMETHasone (DECADRON) tablet 6 mg  6 mg Oral DAILY    HYDROcodone-acetaminophen (NORCO) 5-325 mg per tablet 1 Tablet  1 Tablet Oral Q6H PRN    lip protectant (BLISTEX) ointment 1 Each  1 Each Topical PRN    lisinopril-hydroCHLOROthiazide (PRINZIDE, ZESTORETIC) 10-12.5 mg per tablet 1 Tablet  1 Tablet Oral DAILY    guaiFENesin ER (MUCINEX) tablet 600 mg  600 mg Oral Q12H    baricitinib (OLUMIANT) tablet 4 mg  4 mg Oral DAILY    hydrocortisone (CORTAID) 1 % cream   Topical PRN    [Held by provider] aspirin delayed-release tablet 81 mg  81 mg Oral QHS    atorvastatin (LIPITOR) tablet 40 mg  40 mg Oral DAILY    citalopram (CELEXA) tablet 20 mg  20 mg Oral DAILY    clonazePAM (KlonoPIN) tablet 0.5 mg  0.5 mg Oral Q8H PRN    pantoprazole (PROTONIX) tablet 40 mg  40 mg Oral ACB  tamsulosin (FLOMAX) capsule 0.4 mg  0.4 mg Oral DAILY    sodium chloride (NS) flush 5-40 mL  5-40 mL IntraVENous Q8H    sodium chloride (NS) flush 5-40 mL  5-40 mL IntraVENous PRN    acetaminophen (TYLENOL) tablet 650 mg  650 mg Oral Q6H PRN    Or    acetaminophen (TYLENOL) suppository 650 mg  650 mg Rectal Q6H PRN    polyethylene glycol (MIRALAX) packet 17 g  17 g Oral DAILY PRN    ondansetron (ZOFRAN ODT) tablet 4 mg  4 mg Oral Q8H PRN    Or    ondansetron (ZOFRAN) injection 4 mg  4 mg IntraVENous Q6H PRN    [Held by provider] enoxaparin (LOVENOX) injection 40 mg  40 mg SubCUTAneous DAILY    cefTRIAXone (ROCEPHIN) 1 g in 0.9% sodium chloride (MBP/ADV) 50 mL MBP  1 g IntraVENous Q24H    insulin lispro (HUMALOG) injection   SubCUTAneous AC&HS    doxycycline (VIBRAMYCIN) capsule 100 mg  100 mg Oral Q12H       Signed:  Ramone Chowdary MD

## 2021-12-03 NOTE — PROGRESS NOTES
Patient has been observed during hourly rounding and has rested better since being placed on BiPAP. He got up this am to [facility] and passed Bm that is dark in nature-smelled of old blood. His lovenox and aspirin are currently on hold currently due to this and he will continue to be monitored and assisted as needed. Will prepare to give report to oncoming nurse.

## 2021-12-03 NOTE — PROGRESS NOTES
ACUTE PHYSICAL THERAPY GOALS:  (Developed with and agreed upon by patient and/or caregiver. )  LTG:  (1.)Mr. Jayme Alvarez will move from supine to sit and sit to supine, scoot up and down and roll side to side in bed with INDEPENDENCE within 7 treatment day(s). (2.)Mr. Jayme Alvarez will transfer from bed to chair and chair to bed with INDEPENDENCE using the least restrictive device within 7 treatment day(s). (3.)Mr. Jayme Alvarez will ambulate with INDEPENDENCE for 250+ feet with the least restrictive device within 7 treatment day(s). PHYSICAL THERAPY: Daily Note and AM Treatment Day # 2    Frank Balderas is a 69 y.o. male   PRIMARY DIAGNOSIS: Acute respiratory failure with hypoxia (HCC)  Acute respiratory failure with hypoxia ([facility] [place] [address] [J96.01]         ASSESSMENT:     REHAB RECOMMENDATIONS: CURRENT LEVEL OF FUNCTION:  (Most Recently Demonstrated)   Recommendation to date pending progress:  Setting:   TBD- initial rec was [facility] PT but currently unclear d/t decline in respiratory status  Equipment:    To Be Determined Bed Mobility:   Independent  Sit to Stand:  [employer] Assistance  Transfers:   Contact Guard Assistance  Gait/Mobility:   Contact Guard Assistance     ASSESSMENT:  Mr. Jayme Alvarez has experienced a significant decline in respiratory status compared to initial evaluation which is affecting his mobility and activity tolerance. Was on BIPAP this morning and moved to max Airvo + NRB however still dropping at times to high 80's during activity. He moves well overall, performs bed mobility independently and needs only CGA-SBA for transfers/standing activities for safety. Cues for pacing and rest breaks. Worked on a few sit-stand transfers, standing pre-gait activities, and side steps at edge of bed. Desaturates at the end of session to low 80's so therapy ended and pt returned back to bed, preparing to go back on BIPAP. Will continue with therapy efforts as able.       SUBJECTIVE:   Mr. Jayme Alvarez states, \"my back is hurting. \"    SOCIAL HISTORY/ LIVING ENVIRONMENT: Lives with wife. Independent, active. 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 No DME use, no falls, no home oxygen. Home Environment: Private residence  # Steps to Enter: 2  One/Two Story Residence: One story  Living Alone: No  Support Systems: Spouse/Significant Other, Child(rohit)  OBJECTIVE:     PAIN: VITAL SIGNS: LINES/DRAINS:   Pre Treatment: Pain Screen  Pain Scale 1: Numeric (0 - 10)  Pain Intensity 1:  (no number given)  Pain Onset 1: chronic  Pain Location 1: Back  Post Treatment: continued back pain Vital Signs  O2 Device: Heated;  Hi flow nasal cannula Continuous Pulse Oximetry  O2 Device: Heated, Hi flow nasal cannula     MOBILITY: I Mod I S SBA CGA Min Mod Max Total  NT x2 Comments:   Bed Mobility    Rolling [x] [] [] [] [] [] [] [] [] [] []    Supine to Sit [x] [] [] [] [] [] [] [] [] [] []    Scooting [x] [] [] [] [] [] [] [] [] [] []    Sit to Supine [x] [] [] [] [] [] [] [] [] [] []    Transfers    Sit to Stand [] [] [] [x] [] [] [] [] [] [] []    Bed to Chair [] [] [] [] [x] [] [] [] [] [] []    Stand to Sit [] [] [] [x] [] [] [] [] [] [] []    I=Independent, Mod I=Modified Independent, S=Supervision, SBA=Standby Assistance, CGA=Contact Guard Assistance,   Min=Minimal Assistance, Mod=Moderate Assistance, Max=Maximal Assistance, Total=Total Assistance, NT=Not Tested    BALANCE: Good Fair+ Fair Fair- Poor NT Comments   Sitting Static [x] [] [] [] [] []    Sitting Dynamic [x] [] [] [] [] []              Standing Static [] [] [x] [] [] []    Standing Dynamic [] [] [x] [] [] []      GAIT: I Mod I S SBA CGA Min Mod Max Total  NT x2 Comments:   Level of Assistance [] [] [] [] [x] [] [] [] [] [] []    Distance 6' side steps at EOB    DME close CGA    Gait Quality Slow, shuffled    Weightbearing  Status N/A     I=Independent, Mod I=Modified Independent, S=Supervision, SBA=Standby Assistance, CGA=Contact Guard Assistance,   Min=Minimal Assistance, Mod=Moderate Assistance, Max=Maximal Assistance, Total=Total Assistance, NT=Not Tested    PLAN:   FREQUENCY/DURATION: PT Plan of Care: 3 times/week for duration of hospital stay or until stated goals are met, whichever comes first.  TREATMENT:     TREATMENT:   ($$ Therapeutic Activity: 8-22 mins    )  Co-Treatment PT/OT necessary due to patient's decreased overall endurance/tolerance levels, as well as need for high level skilled assistance to complete functional transfers/mobility and functional tasks  Therapeutic Activity (17 Minutes): Therapeutic activity included Rolling, Supine to Sit, Sit to Supine, Scooting, Transfer Training, Ambulation on level ground, Sitting balance  and Standing balance to improve functional Mobility, Strength, Activity tolerance and balance.     TREATMENT GRID:  N/A    AFTER TREATMENT POSITION/PRECAUTIONS:  Bed, Needs within reach and RN notified    INTERDISCIPLINARY COLLABORATION:  RN/PCT, PT/PTA and OT/BURCIAGA    TOTAL TREATMENT DURATION:  PT Patient Time In/Time Out  Time In: 0952  Time Out: [id number], DPT

## 2021-12-03 NOTE — PROGRESS NOTES
Patient is on high flow and NRB and sats are not coming above 87%. Will continue to monitor and assist patient as needed.

## 2021-12-03 NOTE — PROGRESS NOTES
Occupational Therapy Note:  Therapist is discharging patient from OT at this time due to decline in medical status and transfer to ICU. Please reconsult OT when MD deems patient appropriate for continued services. Thank you.   Chante Huntley, OTR/L

## 2021-12-03 NOTE — PROGRESS NOTES
Norco 5 mg po given.      12/03/21 1152   Pain 1   Pain Scale 1 Numeric (0 - 10)   Pain Intensity 1 8   Patient Stated Pain Goal 0   Pain Onset 1 chronic   Pain Location 1 Back   Pain Orientation 1 Inner   Pain Description 1 Aching   Pain Intervention(s) 1 Medication (see MAR)

## 2021-12-04 PROBLEM — U07.1 ACUTE HYPOXEMIC RESPIRATORY FAILURE DUE TO COVID-19 (HCC): Status: ACTIVE | Noted: 2021-01-01

## 2021-12-04 PROBLEM — E87.6 HYPOKALEMIA: Status: RESOLVED | Noted: 2021-01-01 | Resolved: 2021-01-01

## 2021-12-04 PROBLEM — N17.9 AKI (ACUTE KIDNEY INJURY) (HCC): Status: RESOLVED | Noted: 2021-01-01 | Resolved: 2021-01-01

## 2021-12-04 PROBLEM — T38.0X5A STEROID-INDUCED HYPERGLYCEMIA: Status: ACTIVE | Noted: 2021-01-01

## 2021-12-04 PROBLEM — E66.09 CLASS 1 OBESITY DUE TO EXCESS CALORIES WITH SERIOUS COMORBIDITY AND BODY MASS INDEX (BMI) OF 32.0 TO 32.9 IN ADULT: Chronic | Status: ACTIVE | Noted: 2021-01-01

## 2021-12-04 NOTE — PROGRESS NOTES
Rick Quinones  Admission Date: 11/29/2021         Daily Progress Note: 12/4/2021    The patient's chart is reviewed and the patient is discussed with the staff. Background: 69 y.o.  male, PMH HTN, HLD, GERD, and CAD seen and evaluated at the request of Dr. Chantel Lam for worsening acute respiratory failure due to COVID 19. The patient presented to the ED on 11/29/21 with c/o mild fever, shortness of breath, and back pain. He was found to be COVID positive on 11/23/2021 after being tested at [facility].  The patient was noted to have an O2 sat of 85% on RA initially that improved on 4L NC. However, the patient has continued to have increased oxygen needs and is now on AirVo 60L/95% with O2 sat 89-90%. He states he does not really want to be intubated and placed on the ventilator if at all possible but will consider if he continues decompensating. He is not opposed to BIPAP if needed. The patient retested positive for COVID on 11/27/2021, baricitinib was initiated on 11/30/21, last dose 12/13/21; dexamethasone 6 mg IV started on 11/30/21, Vibramycin and ceftriaxone started on 11/29/21. He expresses desiring bronchoscopy if indicated.     Subjective:     Doing well on BiPAP, no distress up in chair states he feels a little better    Current Facility-Administered Medications   Medication Dose Route Frequency    phenol throat spray (CHLORASEPTIC) 1 Spray  1 Spray Oral PRN    albuterol (PROVENTIL HFA, VENTOLIN HFA, PROAIR HFA) inhaler 2 Puff  2 Puff Inhalation Q4H PRN    dexAMETHasone (DECADRON) tablet 6 mg  6 mg Oral DAILY    morphine 10 mg/mL injection 5 mg  5 mg IntraVENous Q4H PRN    HYDROcodone-acetaminophen (NORCO) 5-325 mg per tablet 1 Tablet  1 Tablet Oral Q6H PRN    lip protectant (BLISTEX) ointment 1 Each  1 Each Topical PRN    lisinopril-hydroCHLOROthiazide (PRINZIDE, ZESTORETIC) 10-12.5 mg per tablet 1 Tablet  1 Tablet Oral DAILY    guaiFENesin ER (MUCINEX) tablet 600 mg  600 mg Oral Q12H    baricitinib (OLUMIANT) tablet 4 mg  4 mg Oral DAILY    hydrocortisone (CORTAID) 1 % cream   Topical PRN    [Held by provider] aspirin delayed-release tablet 81 mg  81 mg Oral QHS    atorvastatin (LIPITOR) tablet 40 mg  40 mg Oral DAILY    citalopram (CELEXA) tablet 20 mg  20 mg Oral DAILY    clonazePAM (KlonoPIN) tablet 0.5 mg  0.5 mg Oral Q8H PRN    pantoprazole (PROTONIX) tablet 40 mg  40 mg Oral ACB    tamsulosin (FLOMAX) capsule 0.4 mg  0.4 mg Oral DAILY    sodium chloride (NS) flush 5-40 mL  5-40 mL IntraVENous Q8H    sodium chloride (NS) flush 5-40 mL  5-40 mL IntraVENous PRN    acetaminophen (TYLENOL) tablet 650 mg  650 mg Oral Q6H PRN    Or    acetaminophen (TYLENOL) suppository 650 mg  650 mg Rectal Q6H PRN    polyethylene glycol (MIRALAX) packet 17 g  17 g Oral DAILY PRN    ondansetron (ZOFRAN ODT) tablet 4 mg  4 mg Oral Q8H PRN    Or    ondansetron (ZOFRAN) injection 4 mg  4 mg IntraVENous Q6H PRN    [Held by provider] enoxaparin (LOVENOX) injection 40 mg  40 mg SubCUTAneous DAILY    insulin lispro (HUMALOG) injection   SubCUTAneous AC&HS     Review of Systems    Constitutional: negative for fever, chills, sweats  Cardiovascular: negative for chest pain, palpitations, syncope, edema  Gastrointestinal:  negative for dysphagia, reflux, vomiting, diarrhea, abdominal pain, or melena  Neurologic:  negative for focal weakness, numbness, headache  Objective:     Vitals:    12/04/21 1106 12/04/21 1121 12/04/21 1152 12/04/21 1211   BP: (!) 141/69      Pulse: 63 (!) 55     Resp: 25 30     Temp: 98.2 °F (36.8 °C)      SpO2: (!) 88% 97% 91% 90%   Weight:       Height:           Intake/Output Summary (Last 24 hours) at 12/4/2021 1235  Last data filed at 12/4/2021 0116  Gross per 24 hour   Intake --   Output 775 ml   Net -775 ml     Physical Exam:   Constitution:  the patient is well developed and in no acute distress  HEENT:  Sclera clear, pupils equal, oral mucosa moist  Respiratory: On 15L NRB and AirVo 60L-90%  Cardiovascular:  RRR without M,G,R  Gastrointestinal: soft and non-tender; with positive bowel sounds. Musculoskeletal: warm without cyanosis. There is trace lower extremity edema. Skin:  no jaundice or rashes, no wounds   Neurologic: no gross neuro deficits     Psychiatric:  alert and oriented x 3    CXR:  None today  CXR: 12/1--Progressive, diffuse infiltrates       11/29/21--left midlung opacities            LAB:  Recent Labs     12/04/21  0320 12/03/21  0838 12/02/21  0803   WBC 9.4 9.1 8.3   HGB 13.9 14.3 14.7   HCT 40.4* 42.7 44.0    175 147*     Recent Labs     12/04/21  0320 12/03/21  0838 12/02/21  0803   * 138 138   K 3.6 3.5 3.6    103 105   CO2 30 29 29   * 113* 99   BUN 18 17 21   CREA 0.69* 0.80 0.95   CA 8.3 8.5 8.5   ALB 2.2* 2.4* 2.5*   AST 51* 63* 61*   ALT 56 52 52   AP 73 72 66     Recent Labs     12/02/21  0803   BNPNT 1,476*   CRP 8.2*     Recent Labs     12/01/21  2349   PHI 7.49*   PCO2I 31.9*   PO2I 80   HCO3I 24.5     No results for input(s): SDES, CULT in the last 72 hours. Assessment and Plan:  (Medical Decision Making)   Principal Problem:    Acute respiratory failure with hypoxia ([facility] [address] (11/29/2021)  --+COVID, pro BNP 1476.   Currently on BiPAP tolerating well- trialing Optiflow  --Has albuterol inhaler as needed  --On Celexa 20 mg daily,     Active Problems:    HLD (hyperlipidemia) (12/19/2012)  --Chronic, on atorvastatin      HTN (hypertension) (12/19/2012)  --Chronic, was on lisinopril/HCTZ at home, has been restarted here      CAD (coronary artery disease) (12/19/2012)  --Chronic      GERD (gastroesophageal reflux disease) (12/19/2012)  --On Protonix      Class 1 obesity due to excess calories with serious comorbidity and body mass index (BMI) of 32.0 to 32.9 in adult (11/29/2021)  --Chronic      LEONCIO [facility]acute kidney [facility]) [facility] (11/29/2021)  --WNL      Hypokalemia (11/29/2021)  --WNL      COVID-19 (11/29/2021)  --Tested positive 11/27/2021, on droplet plus precautions  --baricitinib was initiated on 11/30/21, last dose 12/13/21; dexamethasone 6 mg IV started on 11/30/21, Vibramycin and ceftriaxone started on 11/29/21  --Self proning essential      Hyperglycemia (11/29/2021)  --Per primary team      More than 50% of the time documented was spent in face-to-face contact with the patient and in the care of the patient on the floor/unit where the patient is located.     Liberty Rand MD

## 2021-12-04 NOTE — PROGRESS NOTES
Reviewed notes for any new spiritual concerns    Patient is sitting in chair enjoying lunch    Will follow as needed

## 2021-12-05 NOTE — PROGRESS NOTES
Pt found on floor - he said he lowered himself to the floor. Pt placed immediately back on BIPAP. Pt states \" Im not in pain\". Pt placed back in bed. Pt awake and alert.

## 2021-12-05 NOTE — PROGRESS NOTES
Bedside shift change report given to FILIBERTO Morrissey (oncoming nurse) by Marcine Moritz (offgoing nurse). Report included the following information SBAR, Kardex, Procedure Summary, Intake/Output, MAR, Accordion, Recent Results, Med Rec Status, Cardiac Rhythm Normal Sinus, Alarm Parameters  and Quality Measures.

## 2021-12-05 NOTE — PROGRESS NOTES
Patient was found sitting in the floor by nursing staff. When asked what happened he stated that he sat down in the floor because he became out of breath. Patient states he isn't hurt and didn't fall. Spouse Daljit Simon called and notified at [phone]. MD notified as well. [employer] done.

## 2021-12-05 NOTE — PROGRESS NOTES
Talon Almonte  Admission Date: 11/29/2021         Daily Progress Note: 12/5/2021    The patient's chart is reviewed and the patient is discussed with the staff. Background: 69 y.o.  male, PMH HTN, HLD, GERD, and CAD seen and evaluated at the request of Dr. Alejandro Bridges for worsening acute respiratory failure due to COVID 19. The patient presented to the ED on 11/29/21 with c/o mild fever, shortness of breath, and back pain. He was found to be COVID positive on 11/23/2021 after being tested at [facility].  The patient was noted to have an O2 sat of 85% on RA initially that improved on 4L NC. However, the patient has continued to have increased oxygen needs and is now on AirVo 60L/95% with O2 sat 89-90%. He states he does not really want to be intubated and placed on the ventilator if at all possible but will consider if he continues decompensating. He is not opposed to BIPAP if needed. The patient retested positive for COVID on 11/27/2021, baricitinib was initiated on 11/30/21, last dose 12/13/21; dexamethasone 6 mg IV started on 11/30/21, Vibramycin and ceftriaxone started on 11/29/21. He expresses desiring bronchoscopy if indicated.     Subjective:     Doing well on BiPAP, tolerated airvo yesterday    Current Facility-Administered Medications   Medication Dose Route Frequency    phenol throat spray (CHLORASEPTIC) 1 Spray  1 Spray Oral PRN    traZODone (DESYREL) tablet 50 mg  50 mg Oral QHS PRN    albuterol (PROVENTIL HFA, VENTOLIN HFA, PROAIR HFA) inhaler 2 Puff  2 Puff Inhalation Q4H PRN    dexAMETHasone (DECADRON) tablet 6 mg  6 mg Oral DAILY    morphine 10 mg/mL injection 5 mg  5 mg IntraVENous Q4H PRN    HYDROcodone-acetaminophen (NORCO) 5-325 mg per tablet 1 Tablet  1 Tablet Oral Q6H PRN    lip protectant (BLISTEX) ointment 1 Each  1 Each Topical PRN    lisinopril-hydroCHLOROthiazide (PRINZIDE, ZESTORETIC) 10-12.5 mg per tablet 1 Tablet  1 Tablet Oral DAILY    guaiFENesin ER (MUCINEX) tablet 600 mg  600 mg Oral Q12H    baricitinib (OLUMIANT) tablet 4 mg  4 mg Oral DAILY    hydrocortisone (CORTAID) 1 % cream   Topical PRN    [Held by provider] aspirin delayed-release tablet 81 mg  81 mg Oral QHS    atorvastatin (LIPITOR) tablet 40 mg  40 mg Oral DAILY    citalopram (CELEXA) tablet 20 mg  20 mg Oral DAILY    clonazePAM (KlonoPIN) tablet 0.5 mg  0.5 mg Oral Q8H PRN    pantoprazole (PROTONIX) tablet 40 mg  40 mg Oral ACB    tamsulosin (FLOMAX) capsule 0.4 mg  0.4 mg Oral DAILY    sodium chloride (NS) flush 5-40 mL  5-40 mL IntraVENous Q8H    sodium chloride (NS) flush 5-40 mL  5-40 mL IntraVENous PRN    acetaminophen (TYLENOL) tablet 650 mg  650 mg Oral Q6H PRN    Or    acetaminophen (TYLENOL) suppository 650 mg  650 mg Rectal Q6H PRN    polyethylene glycol (MIRALAX) packet 17 g  17 g Oral DAILY PRN    ondansetron (ZOFRAN ODT) tablet 4 mg  4 mg Oral Q8H PRN    Or    ondansetron (ZOFRAN) injection 4 mg  4 mg IntraVENous Q6H PRN    [Held by provider] enoxaparin (LOVENOX) injection 40 mg  40 mg SubCUTAneous DAILY    insulin lispro (HUMALOG) injection   SubCUTAneous AC&HS     Review of Systems    Constitutional: negative for fever, chills, sweats  Cardiovascular: negative for chest pain, palpitations, syncope, edema  Gastrointestinal:  negative for dysphagia, reflux, vomiting, diarrhea, abdominal pain, or melena  Neurologic:  negative for focal weakness, numbness, headache  Objective:     Vitals:    12/05/21 0839 12/05/21 0845 12/05/21 0900 12/05/21 0915   BP:   138/63    Pulse: 76 90 87 82   Resp: (!) 32 12 10 19   Temp:       SpO2: 95% 91% (!) 89% 92%   Weight:       Height:           Intake/Output Summary (Last 24 hours) at 12/5/2021 1003  Last data filed at 12/5/2021 0500  Gross per 24 hour   Intake --   Output 1075 ml   Net -1075 ml     Physical Exam:   Constitution:  the patient is well developed and in no acute distress  HEENT:  Sclera clear, pupils equal, oral mucosa moist  Respiratory: On 15L NRB and AirVo 60L-90%  Cardiovascular:  RRR without M,G,R  Gastrointestinal: soft and non-tender; with positive bowel sounds. Musculoskeletal: warm without cyanosis. There is trace lower extremity edema. Skin:  no jaundice or rashes, no wounds   Neurologic: no gross neuro deficits     Psychiatric:  alert and oriented x 3    CXR:  None today  CXR: 12/1--Progressive, diffuse infiltrates       11/29/21--left midlung opacities            LAB:  Recent Labs     12/05/21  0332 12/04/21  0320 12/03/21  0838   WBC 16.9* 9.4 9.1   HGB 14.9 13.9 14.3   HCT 45.1 40.4* 42.7    196 175     Recent Labs     12/05/21  0332 12/04/21  0320 12/03/21  0838    137* 138   K 3.7 3.6 3.5   CL 98 101 103   CO2 33* 30 29   * 125* 113*   BUN 24* 18 17   CREA 0.90 0.69* 0.80   CA 8.7 8.3 8.5   ALB 2.4* 2.2* 2.4*   AST 64* 51* 63*   ALT 64 56 52    73 72     No results for input(s): LAC, TROPHS, BNPNT, CRP in the last 72 hours. No lab exists for component: ESR  Recent Labs     12/05/21  0029   PHI 7.42   PCO2I 43.5   PO2I 93   HCO3I 28.1*     No results for input(s): SDES, CULT in the last 72 hours. Assessment and Plan:  (Medical Decision Making)   Principal Problem:    Acute respiratory failure with hypoxia ([address] (11/29/2021)  --+COVID, pro BNP 1476.   Currently on BiPAP tolerating well- trialing Optiflow as tolerated  --Has albuterol inhaler as needed  --On Celexa 20 mg daily,     Active Problems:    HLD (hyperlipidemia) (12/19/2012)  --Chronic, on atorvastatin      HTN (hypertension) (12/19/2012)  --Chronic, was on lisinopril/HCTZ at home, has been restarted here      CAD (coronary artery disease) (12/19/2012)  --Chronic      GERD (gastroesophageal reflux disease) (12/19/2012)  --On Protonix      Class 1 obesity due to excess calories with serious comorbidity and body mass index (BMI) of 32.0 to 32.9 in adult (11/29/2021)  --Chronic      LEONCIO (acute kidney injury) ([facility] (11/29/2021)  --WNL      Hypokalemia (11/29/2021)  --WNL      COVID-19 (11/29/2021)  --Tested positive 11/27/2021, on droplet plus precautions  --baricitinib was initiated on 11/30/21, last dose 12/13/21; dexamethasone 6 mg IV started on 11/30/21, Vibramycin and ceftriaxone started on 11/29/21 now off abx  --Self proning essential      Hyperglycemia (11/29/2021)  --Per primary team      More than 50% of the time documented was spent in face-to-face contact with the patient and in the care of the patient on the floor/unit where the patient is located.     Ashok Langley MD

## 2021-12-06 NOTE — PROGRESS NOTES
Patient having ongoing periods to desaturation into the low 80's while on NRB and max airvo    RT at bedside to place pt back on BiPAP // during exchange pt dropped into low 70's     Pt now on MAX BiPAP with SAT 90%    Will have to hold AM medications     MD Made aware of pt condition

## 2021-12-06 NOTE — CONSULTS
Comprehensive Nutrition Assessment    Type and Reason for Visit: Initial, Consult  TPN Management (pulmonary)    Nutrition Recommendations/Plan:   Parenteral Nutrition:  Peripheral parenteral nutrition to begin at 1800  Initiate: Dex 5%, 4.25% AA 2 L (85ml/hr)   Hold 250 ml 20% lipids tonight. To provide: 694 kcal/d (42% of needs), 87 grams of protein/d (66% of needs), 102 grams of CHO/d and 2040 ml of total volume/d. Lytes/L:   Sodium 60 meq (60 meq NaCl), Potassium 10 meq (5 meq KCl, 5 meq KPO4), 5 meq Mg, 4.5 meq Calcium  Other additives: MTE, MVI (MVI/TCE MWF d/t national shortage)  Nutritional Supplement Therapy:   Implement electrolyte replacement per nutrition support protocols  Replacement indicated:  None  Labs:   BMP daily  Mg tomorrow and MWF  Phos tomorrow and MWF    Triglyceride tomorrow  POC Glucoses/SSI Active     Malnutrition Assessment:  Malnutrition Status: At risk for malnutrition (specify) (poor PO intake throughout admission)    Nutrition Assessment:   Nutrition History: Nutrition history unable to be obtained d/t current medical condition. Cultural/Sabianism/Ethnic Food Preference(s): None   Nutrition Background: Pt admitted with ARF secondary to covid 19+. PMH notable for HTN, CAD, HLD, GERD. Daily Update:  Pt transferred to ICU 12/6. 100% BIPAP. No enteral access. Plans to start PPN while awaiting PICC placement. 2 PIVs.  Abdominal Status (last documented): Last BM 12/05/21.   Pertinent Medications: 6 mg decadron q day, SSI, protonix    Lab Results   Component Value Date/Time    Sodium 138 12/05/2021 03:32 AM    Potassium 3.7 12/05/2021 03:32 AM    Chloride 98 12/05/2021 03:32 AM    CO2 33 (H) 12/05/2021 03:32 AM    Anion gap 7 12/05/2021 03:32 AM    Glucose 112 (H) 12/05/2021 03:32 AM    BUN 24 (H) 12/05/2021 03:32 AM    Creatinine 0.90 12/05/2021 03:32 AM    Calcium 8.7 12/05/2021 03:32 AM    Albumin 2.4 (L) 12/05/2021 03:32 AM    Magnesium 1.9 12/01/2021 10:00 AM     Noted BNP 1476 (12/2)    Lab Results   Component Value Date/Time    Glucose 112 (H) 12/05/2021 03:32 AM    Glucose (POC) 116 (H) 12/06/2021 11:00 AM    Glucose (POC) 115 (H) 12/06/2021 08:38 AM    Glucose (POC) 156 (H) 12/05/2021 11:23 PM    Glucose (POC) 151 (H) 12/05/2021 06:09 PM    Glucose (POC) 127 (H) 12/05/2021 12:39 PM    Glucose (POC) 125 (H) 12/05/2021 07:53 AM     SSI (2 units 12/5, 2 units thus far 12/6)  No h/o DM. A1C 5.9 (11/30/21)    Nutrition Related Findings:   NFPE deferred d/t isolation status. PPN to begin 12/6. PICC pending. Current Nutrition Therapies:  ADULT DIET Regular    Current Intake:   Average Meal Intake: 1-25% (last recorded intake was 0% on 12/2. ) Average Supplement Intake: None ordered      Anthropometric Measures:  Height: 6' (182.9 cm)  Current Body Wt: 108.9 kg (240 lb 1.3 oz) (11/29), Weight source: Stated  BMI: 32.6, Obese class 1 (BMI 30.0-34. 9)  Admission Body Weight: 240 lb 1.3 oz (11/29 pt stated)  Ideal Body Weight (lbs) (Calculated): 178 lbs (81 kg), 134.9 %  Usual Body Wt:  (unknown)   Edema: No data recorded     Estimated Daily Nutrient Needs:  Energy (kcal/day): 8625-4238 (Kcal/kg (15-20), Weight Used: Current (108.9 kg))  Protein (g/day): 131-218 (1.2-2) Weight Used: (Current)  Fluid (ml/day): 3083-9437 (1 ml/kcal (or per MD))    Nutrition Diagnosis:   · Inadequate oral intake related to impaired respiratory function as evidenced by intake 0-25% (unable to come off of BIPAP for PO)    Nutrition Interventions:   Food and/or Nutrient Delivery: Continue NPO, Start parenteral nutrition     Coordination of Nutrition Care: Continue to monitor while inpatient  Plan of Care discussed with Hilda Yun RN    Goals: Active Goal: Meet >75% of estimated needs via TPN within 3 days.      Nutrition Monitoring and Evaluation:      Food/Nutrient Intake Outcomes: Parenteral nutrition intake/tolerance  Physical Signs/Symptoms Outcomes: Biochemical data, GI status, Hemodynamic status, Weight    Discharge Planning:     Too soon to determine    Leah Carson Bobby [address], [address], [address] [place] [address], Formerly [facility] [address]

## 2021-12-06 NOTE — PROGRESS NOTES
PICC Placement Note    PRE-PROCEDURE VERIFICATION  Correct Procedure: yes. Time out completed with assistant Anmol Campos RN  and all persons present in agreement with time out. Correct Site:  yes  Temperature: 98.2 F, Temperature Source: Temp Source: Axillary       Recent Labs     12/05/21  0332   BUN 24*   CREA 0.90      WBC 16.9*     Allergies: Patient has no known allergies. Education materials for [facility] Care given to patient or family. PROCEDURE DETAIL  A triple lumen PICC line was started for TPN. The following documentation is in addition to the PICC properties in the lines/airways flowsheet :  Lot #: [id number]  xylocaine used: yes  Mid-Arm Circumference: 35 (cm)  Internal Catheter Length: 43 (cm)  Internal Catheter Total Length: 43 (cm)  Vein Selection for PICC:right brachial  Central Line Bundle followed yes  Complication Related to Insertion: None  Both the insertion guidewire and ECG guidewire were removed intact all ports have positive blood return and were flush well with normal saline. The location of the tip of the PICC is verified using ECG technology. The tip is in the SVC per ECG reading. See image below.      Line is okay to use: yes

## 2021-12-06 NOTE — PROGRESS NOTES
PT Note:  Therapist is discontinuing physical therapy at this time due to decline in status. Mr. Kelly Agudelo physical therapy goals were not met. Please reorder PT when our services are again appropriate. Thank you.   Cachorro Leon, PT, DPT  12/6/2021

## 2021-12-06 NOTE — PROGRESS NOTES
Chart reviewed and pt discussed in am IDR. Tx from 8th for CCU covid positive isolation. Currently on BIPAP 90%. No gtts. Screen completed prior to tx. CM following for any assist and d/c needs/POC when stable per MD. LOS 7 days.

## 2021-12-06 NOTE — PROGRESS NOTES
Patient tolerated being off of Bi-Pap for 5 hours today.  Oxygen saturation 93% while currently on Bi-Pap

## 2021-12-06 NOTE — PROGRESS NOTES
Dania Boone  Admission Date: 11/29/2021         Daily Progress Note: 12/6/2021    The patient's chart is reviewed and the patient is discussed with the staff. Background: 69 y.o.  male, PMH HTN, HLD, GERD, and CAD seen and evaluated at the request of Dr. Ellie Horton for worsening acute respiratory failure due to COVID 19. The patient presented to the ED on 11/29/21 with c/o mild fever, shortness of breath, and back pain. He was found to be COVID positive on 11/23/2021 after being tested at [facility].  The patient was noted to have an O2 sat of 85% on RA initially that improved on 4L NC. However, the patient has continued to have increased oxygen needs and is now on AirVo 60L/95% with O2 sat 89-90%. He states he does not really want to be intubated and placed on the ventilator if at all possible but will consider if he continues decompensating. He is not opposed to BIPAP if needed. The patient retested positive for COVID on 11/27/2021, baricitinib was initiated on 11/30/21, last dose 12/13/21; dexamethasone 6 mg IV started on 11/30/21, Vibramycin and ceftriaxone started on 11/29/21. He expresses desiring bronchoscopy if indicated.     Subjective:     On 100 % BIPAP  Did not tolerate airvo much yesterday      Current Facility-Administered Medications   Medication Dose Route Frequency    pantoprazole (PROTONIX) 40 mg in 0.9% sodium chloride 10 mL injection  40 mg IntraVENous DAILY    dexamethasone (DECADRON) 10 mg/mL injection 6 mg  6 mg IntraVENous DAILY    LORazepam (ATIVAN) injection 2 mg  2 mg IntraVENous Q4H PRN    NUTRITIONAL SUPPORT ELECTROLYTE PRN ORDERS   Does Not Apply PRN    TPN ADULT - dextrose 5% amino acid 4.25%   IntraVENous QPM    phenol throat spray (CHLORASEPTIC) 1 Spray  1 Spray Oral PRN    traZODone (DESYREL) tablet 50 mg  50 mg Oral QHS PRN    albuterol (PROVENTIL HFA, VENTOLIN HFA, PROAIR HFA) inhaler 2 Puff  2 Puff Inhalation Q4H PRN    morphine 10 mg/mL injection 5 mg  5 mg IntraVENous Q4H PRN    HYDROcodone-acetaminophen (NORCO) 5-325 mg per tablet 1 Tablet  1 Tablet Oral Q6H PRN    lip protectant (BLISTEX) ointment 1 Each  1 Each Topical PRN    lisinopril-hydroCHLOROthiazide (PRINZIDE, ZESTORETIC) 10-12.5 mg per tablet 1 Tablet  1 Tablet Oral DAILY    guaiFENesin ER (MUCINEX) tablet 600 mg  600 mg Oral Q12H    baricitinib (OLUMIANT) tablet 4 mg  4 mg Oral DAILY    hydrocortisone (CORTAID) 1 % cream   Topical PRN    [Held by provider] aspirin delayed-release tablet 81 mg  81 mg Oral QHS    atorvastatin (LIPITOR) tablet 40 mg  40 mg Oral DAILY    citalopram (CELEXA) tablet 20 mg  20 mg Oral DAILY    clonazePAM (KlonoPIN) tablet 0.5 mg  0.5 mg Oral Q8H PRN    tamsulosin (FLOMAX) capsule 0.4 mg  0.4 mg Oral DAILY    sodium chloride (NS) flush 5-40 mL  5-40 mL IntraVENous Q8H    sodium chloride (NS) flush 5-40 mL  5-40 mL IntraVENous PRN    acetaminophen (TYLENOL) tablet 650 mg  650 mg Oral Q6H PRN    Or    acetaminophen (TYLENOL) suppository 650 mg  650 mg Rectal Q6H PRN    polyethylene glycol (MIRALAX) packet 17 g  17 g Oral DAILY PRN    ondansetron (ZOFRAN ODT) tablet 4 mg  4 mg Oral Q8H PRN    Or    ondansetron (ZOFRAN) injection 4 mg  4 mg IntraVENous Q6H PRN    [Held by provider] enoxaparin (LOVENOX) injection 40 mg  40 mg SubCUTAneous DAILY    insulin lispro (HUMALOG) injection   SubCUTAneous AC&HS     Review of Systems    Constitutional: negative for fever, chills, sweats  Cardiovascular: negative for chest pain, palpitations, syncope, edema  Gastrointestinal:  negative for dysphagia, reflux, vomiting, diarrhea, abdominal pain, or melena  Neurologic:  negative for focal weakness, numbness, headache  Objective:     Vitals:    12/06/21 1000 12/06/21 1015 12/06/21 1136 12/06/21 1301   BP: (!) 121/59      Pulse: 76 75     Resp: (!) 33 (!) 33     Temp:       SpO2: 93% 92% 95%    Weight:       Height:    6' (1.829 m)       Intake/Output Summary (Last 24 hours) at 12/6/2021 1440  Last data filed at 12/6/2021 0402  Gross per 24 hour   Intake 225 ml   Output 925 ml   Net -700 ml     Physical Exam:   Constitution:  the patient is well developed and in no acute distress  HEENT:  Sclera clear, pupils equal, oral mucosa moist  Respiratory: On 15L NRB and AirVo 60L-90%  Cardiovascular:  RRR without M,G,R  Gastrointestinal: soft and non-tender; with positive bowel sounds. Musculoskeletal: warm without cyanosis. There is trace lower extremity edema. Skin:  no jaundice or rashes, no wounds   Neurologic: no gross neuro deficits     Psychiatric:  alert and oriented x 3    CXR:  None today  CXR: 12/1--Progressive, diffuse infiltrates       11/29/21--left midlung opacities            LAB:  Recent Labs     12/05/21  0332 12/04/21  0320   WBC 16.9* 9.4   HGB 14.9 13.9   HCT 45.1 40.4*    196     Recent Labs     12/05/21  0332 12/04/21  0320    137*   K 3.7 3.6   CL 98 101   CO2 33* 30   * 125*   BUN 24* 18   CREA 0.90 0.69*   CA 8.7 8.3   ALB 2.4* 2.2*   AST 64* 51*   ALT 64 56    73     No results for input(s): LAC, TROPHS, BNPNT, CRP in the last 72 hours. No lab exists for component: ESR  Recent Labs     12/05/21 2124 12/05/21  0029   PHI 7.43 7.42   PCO2I 44.2 43.5   PO2I 64* 93   HCO3I 29.1* 28.1*     No results for input(s): SDES, CULT in the last 72 hours. Assessment and Plan:  (Medical Decision Making)   Principal Problem:    Acute respiratory failure with hypoxia ([address] (11/29/2021)  --+COVID, pro BNP 1476.   Currently on BiPAP tolerating well- trialing Optiflow as tolerated  --Has albuterol inhaler as needed  --On Celexa 20 mg daily,     Active Problems:    HLD (hyperlipidemia) (12/19/2012)  --Chronic, on atorvastatin      HTN (hypertension) (12/19/2012)  --Chronic, was on lisinopril/HCTZ at home, has been restarted here      CAD (coronary artery disease) (12/19/2012)  --Chronic      GERD (gastroesophageal reflux disease) (12/19/2012)  --On Protonix      Class 1 obesity due to excess calories with serious comorbidity and body mass index (BMI) of 32.0 to 32.9 in adult (11/29/2021)  --Chronic      LEONCIO (acute kidney injury) ([facility] [address] (11/29/2021)  --WNL      Hypokalemia (11/29/2021)  --WNL      COVID-19 (11/29/2021)  --Tested positive 11/27/2021, on droplet plus precautions  --baricitinib was initiated on 11/30/21, last dose 12/13/21; dexamethasone 6 mg IV started on 11/30/21, Vibramycin and ceftriaxone started on 11/29/21 now off abx  --Self proning essential      Hyperglycemia (11/29/2021)  --Per primary team      More than 50% of the time documented was spent in face-to-face contact with the patient and in the care of the patient on the floor/unit where the patient is located.     I have discussed with his wife, she is concern about nutrition, wants to avoid intubation, feels he is better, but he has high chance of decompensation and needed for vent support, will place PICC line and start TNP for now    Nicole Light MD

## 2021-12-06 NOTE — PROGRESS NOTES
This was a visit to a covid patient. I had prayer for the patient outside of the patient's room and I did not enter the room. I received updated information from the patient's nurse concerning this patient's needs. I will continue to follow-up with this patient and offer assistance and prayer.           Jose Bustos, [address] [facility], [facility]

## 2021-12-06 NOTE — PROGRESS NOTES
Bedside and verbal shift change report received from  [address] (offgoing nurse). Report included the following information SBAR, Kardex, ED Summary, Procedure Summary, Intake/Output, MAR, Recent Results, Med Rec Status, Cardiac Rhythm NSR, Alarm Parameters  and Quality Measures.      Dual skin assessment completed at bedside: WNL (list pertinent skin assessment findings)    Dual verification of gtts completed (name of gtts verified): NA        92% ON NRB AND MAX AIRVO

## 2021-12-07 NOTE — PROGRESS NOTES
Schuyler Olguin  Admission Date: 11/29/2021         Daily Progress Note: 12/7/2021    The patient's chart is reviewed and the patient is discussed with the staff. Background: 69 y.o.  male, PMH HTN, HLD, GERD, and CAD seen and evaluated at the request of Dr. Ajit Esparza for worsening acute respiratory failure due to COVID 19. The patient presented to the ED on 11/29/21 with c/o mild fever, shortness of breath, and back pain. He was found to be COVID positive on 11/23/2021 after being tested at [facility].  The patient was noted to have an O2 sat of 85% on RA initially that improved on 4L NC. However, the patient has continued to have increased oxygen needs and is now on AirVo 60L/95% with O2 sat 89-90%. He states he does not really want to be intubated and placed on the ventilator if at all possible but will consider if he continues decompensating. He is not opposed to BIPAP if needed. The patient retested positive for COVID on 11/27/2021, baricitinib was initiated on 11/30/21, last dose 12/13/21; dexamethasone 6 mg IV started on 11/30/21, Vibramycin and ceftriaxone started on 11/29/21. He expresses desiring bronchoscopy if indicated. Subjective:      On 90  % BIPAP  On precedex gtt  Lethargic  On TPN now    Current Facility-Administered Medications   Medication Dose Route Frequency    dexmedeTOMidine in 0.9 % NaCl (PRECEDEX) 400 mcg/100 mL (4 mcg/mL) infusion soln  0.1-1.5 mcg/kg/hr IntraVENous TITRATE    potassium phosphate 20 mmol in 0.9% sodium chloride 250 mL infusion   IntraVENous ONCE    TPN ADULT-CENTRAL - dextrose 14% amino acid 8%   IntraVENous QPM    fat emulsion 20% (LIPOSYN, INTRAlipid) infusion 250 mL  250 mL IntraVENous QPM    pantoprazole (PROTONIX) 40 mg in 0.9% sodium chloride 10 mL injection  40 mg IntraVENous DAILY    dexamethasone (DECADRON) 10 mg/mL injection 6 mg  6 mg IntraVENous DAILY    LORazepam (ATIVAN) injection 2 mg  2 mg IntraVENous Q4H PRN    NUTRITIONAL SUPPORT ELECTROLYTE PRN ORDERS   Does Not Apply PRN    TPN ADULT - dextrose 5% amino acid 4.25%   IntraVENous QPM    sodium chloride (NS) flush 30 mL  30 mL InterCATHeter Q8H    sodium chloride (NS) flush 30 mL  30 mL InterCATHeter PRN    phenol throat spray (CHLORASEPTIC) 1 Spray  1 Spray Oral PRN    traZODone (DESYREL) tablet 50 mg  50 mg Oral QHS PRN    albuterol (PROVENTIL HFA, VENTOLIN HFA, PROAIR HFA) inhaler 2 Puff  2 Puff Inhalation Q4H PRN    morphine 10 mg/mL injection 5 mg  5 mg IntraVENous Q4H PRN    HYDROcodone-acetaminophen (NORCO) 5-325 mg per tablet 1 Tablet  1 Tablet Oral Q6H PRN    lip protectant (BLISTEX) ointment 1 Each  1 Each Topical PRN    lisinopril-hydroCHLOROthiazide (PRINZIDE, ZESTORETIC) 10-12.5 mg per tablet 1 Tablet  1 Tablet Oral DAILY    guaiFENesin ER (MUCINEX) tablet 600 mg  600 mg Oral Q12H    baricitinib (OLUMIANT) tablet 4 mg  4 mg Oral DAILY    hydrocortisone (CORTAID) 1 % cream   Topical PRN    [Held by provider] aspirin delayed-release tablet 81 mg  81 mg Oral QHS    atorvastatin (LIPITOR) tablet 40 mg  40 mg Oral DAILY    citalopram (CELEXA) tablet 20 mg  20 mg Oral DAILY    clonazePAM (KlonoPIN) tablet 0.5 mg  0.5 mg Oral Q8H PRN    tamsulosin (FLOMAX) capsule 0.4 mg  0.4 mg Oral DAILY    sodium chloride (NS) flush 5-40 mL  5-40 mL IntraVENous Q8H    sodium chloride (NS) flush 5-40 mL  5-40 mL IntraVENous PRN    acetaminophen (TYLENOL) tablet 650 mg  650 mg Oral Q6H PRN    Or    acetaminophen (TYLENOL) suppository 650 mg  650 mg Rectal Q6H PRN    polyethylene glycol (MIRALAX) packet 17 g  17 g Oral DAILY PRN    ondansetron (ZOFRAN ODT) tablet 4 mg  4 mg Oral Q8H PRN    Or    ondansetron (ZOFRAN) injection 4 mg  4 mg IntraVENous Q6H PRN    [Held by provider] enoxaparin (LOVENOX) injection 40 mg  40 mg SubCUTAneous DAILY    insulin lispro (HUMALOG) injection   SubCUTAneous AC&HS     Review of Systems    Constitutional: negative for fever, chills, sweats  Cardiovascular: negative for chest pain, palpitations, syncope, edema  Gastrointestinal:  negative for dysphagia, reflux, vomiting, diarrhea, abdominal pain, or melena  Neurologic:  negative for focal weakness, numbness, headache  Objective:     Vitals:    12/07/21 0814 12/07/21 0815 12/07/21 0830 12/07/21 0845   BP:   113/64    Pulse:  60 (!) 59 (!) 59   Resp:  (!) 33 (!) 32 (!) 35   Temp:       SpO2: 92% 90% 93% 92%   Weight:       Height:           Intake/Output Summary (Last 24 hours) at 12/7/2021 1033  Last data filed at 12/7/2021 0700  Gross per 24 hour   Intake 1254.05 ml   Output 950 ml   Net 304.05 ml     Physical Exam:   Constitution:  the patient is well developed and in no acute distress  HEENT:  Sclera clear, pupils equal, oral mucosa moist  Respiratory: On 15L NRB and AirVo 60L-90%  Cardiovascular:  RRR without M,G,R  Gastrointestinal: soft and non-tender; with positive bowel sounds. Musculoskeletal: warm without cyanosis. There is trace lower extremity edema. Skin:  no jaundice or rashes, no wounds   Neurologic: no gross neuro deficits     Psychiatric:  alert and oriented x 3    CXR:  None today  CXR: 12/1--Progressive, diffuse infiltrates       11/29/21--left midlung opacities            LAB:  Recent Labs     12/07/21  0342 12/05/21  0332   WBC 17.5* 16.9*   HGB 13.2* 14.9   HCT 40.2* 45.1    231     Recent Labs     12/07/21  0342 12/05/21  0332    138   K 3.4* 3.7    98   CO2 32 33*   * 112*   BUN 36* 24*   CREA 0.91 0.90   MG 2.8*  --    CA 8.6 8.7   PHOS 2.1*  --    ALB  --  2.4*   AST  --  64*   ALT  --  64   AP  --  103     No results for input(s): LAC, TROPHS, BNPNT, CRP in the last 72 hours. No lab exists for component: ESR  Recent Labs     12/07/21  0531 12/06/21 2105 12/05/21 2124   PHI 7.48* 7.51* 7.43   PCO2I 38.5 34.1* 44.2   PO2I 60* 81 64*   HCO3I 28.9* 27.1* 29.1*     No results for input(s): SDES, CULT in the last 72 hours.   Assessment and Plan:  (Medical Decision Making)   Principal Problem:    Acute respiratory failure with hypoxia ([facility] [address] (11/29/2021)  --+COVID, pro BNP 1476. Currently on BiPAP tolerating well- trialing Optiflow as tolerated  --Has albuterol inhaler as needed  --On Celexa 20 mg daily,     Active Problems:    HLD (hyperlipidemia) (12/19/2012)  --Chronic, on atorvastatin      HTN (hypertension) (12/19/2012)  --Chronic, was on lisinopril/HCTZ at home, has been restarted here      CAD (coronary artery disease) (12/19/2012)  --Chronic      GERD (gastroesophageal reflux disease) (12/19/2012)  --On Protonix      Class 1 obesity due to excess calories with serious comorbidity and body mass index (BMI) of 32.0 to 32.9 in adult (11/29/2021)  --Chronic      LEONCIO (acute kidney injury) ([facility] [address] (11/29/2021)  --WNL      Hypokalemia (11/29/2021)  --WNL      COVID-19 (11/29/2021)  --Tested positive 11/27/2021, on droplet plus precautions  --baricitinib was initiated on 11/30/21, last dose 12/13/21; dexamethasone 6 mg IV started on 11/30/21, Vibramycin and ceftriaxone started on 11/29/21 now off abx  --Self proning essential      Hyperglycemia (11/29/2021)  --Per primary team      More than 50% of the time documented was spent in face-to-face contact with the patient and in the care of the patient on the floor/unit where the patient is located.     I have discussed with his wife, she is concern about nutrition, wants to avoid intubation, feels he is better, but he has high chance of decompensation and needed for vent support,    Patient on TPN now, still on 90 % Fi02, high chance he will need to be intubated   Maris Stanton MD

## 2021-12-07 NOTE — PROGRESS NOTES
Comprehensive Nutrition Assessment    Type and Reason for Visit: Reassess  TPN Management (pulmonary)    Nutrition Recommendations/Plan:   Parenteral Nutrition:  Total parenteral nutrition to begin at 1800  Initiate: Dex 14%, 8% AA 1.44 L (60ml/hr)   Administer 250 ml 20% lipids tonight. To provide: 1646 kcal/d (100% of needs), 115 grams of protein/d (88% of needs), 202 grams of CHO/d and 1690 ml of total volume/d. Lytes/L:   Sodium 60 meq (60 meq NaCl), Potassium 40 meq (10 meq KCl, 30 meq KPO4), 3 meq Mg, 4.5 meq Calcium  Pt to receive a total of ~58 meq K+/day. Other additives: Remove MTE, MVI (MVI/MTE MWF d/t national shortage)  Nutritional Supplement Therapy:   Implement electrolyte replacement per nutrition support protocols  Replacement indicated:  20 mmol KP04 bolus  Labs:   BMP daily  Mg MWF  Phos MWF  Triglyceride tomorrow  POC Glucoses/SSI Active     Malnutrition Assessment:  Malnutrition Status: At risk for malnutrition (specify) (poor PO intake throughout admission)    Nutrition Assessment:   Nutrition History: Nutrition history unable to be obtained d/t current medical condition. Cultural/Pentecostalism/Ethnic Food Preference(s): None   Nutrition Background: Pt admitted with ARF secondary to covid 19+. PMH notable for HTN, CAD, HLD, GERD. Daily Update:  Pt remains BIPAP dependent - unable to take in PO. PPN initiated 12/6. Abdominal Status (last documented): Last BM 12/05/21.   Pertinent Medications: 6 mg decadron q day, SSI, protonix  Continuous medications: precedex    Lab Results   Component Value Date/Time    Sodium 139 12/07/2021 03:42 AM    Potassium 3.4 (L) 12/07/2021 03:42 AM    Chloride 101 12/07/2021 03:42 AM    CO2 32 12/07/2021 03:42 AM    Anion gap 6 (L) 12/07/2021 03:42 AM    Glucose 149 (H) 12/07/2021 03:42 AM    BUN 36 (H) 12/07/2021 03:42 AM    Creatinine 0.91 12/07/2021 03:42 AM    Calcium 8.6 12/07/2021 03:42 AM    Albumin 2.4 (L) 12/05/2021 03:32 AM    Magnesium 2.8 (H) 12/07/2021 03:42 AM    Phosphorus 2.1 (L) 12/07/2021 03:42 AM     K+ and phosphorus being replaced. Current TPN contains ~20 meq K+/day and 10 meq phosphorus/day. Mg elevated with 5 meq Mg/L in TPN. Lab Results   Component Value Date/Time    Glucose 149 (H) 12/07/2021 03:42 AM    Glucose (POC) 147 (H) 12/07/2021 08:01 AM    Glucose (POC) 160 (H) 12/06/2021 10:46 PM    Glucose (POC) 157 (H) 12/06/2021 05:22 PM    Glucose (POC) 116 (H) 12/06/2021 11:00 AM    Glucose (POC) 115 (H) 12/06/2021 08:38 AM    Glucose (POC) 156 (H) 12/05/2021 11:23 PM     SSI (6 units 12/6, 0 units thus far 12/7)  No h/o DM. A1C 5.9 (11/30/21)    Lab Results   Component Value Date/Time    Triglyceride 96 12/07/2021 03:42 AM     Nutrition Related Findings:   NFPE deferred d/t isolation status. PPN to begin 12/6. PICC placed 12/6. Transition to TPN 12/7. Current Nutrition Therapies:  ADULT DIET Regular    Current Intake:   Average Meal Intake: 1-25% (last recorded intake was 0% on 12/2. ) Average Supplement Intake: None ordered      Anthropometric Measures:  Height: 6' (182.9 cm)  Current Body Wt: 108.4 kg (238 lb 15.7 oz) (12/6), Weight source: Bed scale  BMI: 32.4, Obese class 1 (BMI 30.0-34. 9)  Admission Body Weight: 240 lb 1.3 oz (11/29 pt stated)  Ideal Body Weight (lbs) (Calculated): 178 lbs (81 kg), 134.9 %  Usual Body Wt:  (unknown)   Edema: No data recorded     Estimated Daily Nutrient Needs:  Energy (kcal/day): 2115-4420 (Kcal/kg (15-20), Weight Used: Current (108.9 kg))  Protein (g/day): 131-218 (1.2-2) Weight Used: (Current)  Fluid (ml/day): 6249-1379 (1 ml/kcal (or per MD))    Nutrition Diagnosis:   · Inadequate oral intake related to impaired respiratory function as evidenced by NPO or clear liquid status due to medical condition, nutrition support-parenteral nutrition (unable to come off of BIPAP for PO)    Nutrition Interventions:   Food and/or Nutrient Delivery: Continue NPO, Modify parenteral nutrition Coordination of Nutrition Care: Continue to monitor while inpatient  Plan of Care discussed with Anaya Castillo RN    Goals:   Previous Goal Met: Progressing toward goal(s)  Active Goal: Meet >75% of estimated needs via TPN within 3 days. Nutrition Monitoring and Evaluation:      Food/Nutrient Intake Outcomes: Parenteral nutrition intake/tolerance  Physical Signs/Symptoms Outcomes: Biochemical data, GI status, Hemodynamic status, Weight    Discharge Planning:     Too soon to determine    Leah Mckeon [address], [address], [facility] [address] [place] [address], [address] [place] [address] Ave.

## 2021-12-07 NOTE — PROGRESS NOTES
This was a follow-up to a covid patient. The  did not enter the patient's room but prayed for the patient just outside of the patient's door. The  will continue to pray for the patient, offer support and be available to provide additional assistance.       Shaista Whitman MRE

## 2021-12-07 NOTE — PROGRESS NOTES
Patient very agitated. Patient removed bi-pap mask again. Patient refused mask. Vale Binder was eventually replaced. MD notified. New order for Precedex given. Oxygen saturation  95% with bi--pap increased back to 100%.

## 2021-12-07 NOTE — PROGRESS NOTES
Patient removed Bi-Pap mask. Bi-Pap masked replaced. Stayed with patient until oxygen saturation recovered. Patient now calm lying in bed.

## 2021-12-07 NOTE — PROGRESS NOTES
Bedside and verbal shift change report received from  [address]  (offgoing nurse). Report included the following information SBAR, Kardex, ED Summary, Intake/Output, MAR, Recent Results, Med Rec Status, Cardiac Rhythm nsr, Alarm Parameters  and Quality Measures.      Dual skin assessment completed at bedside: wnl (list pertinent skin assessment findings)    Dual verification of gtts completed (name of gtts verified): na

## 2021-12-08 NOTE — PROGRESS NOTES
Care Management Interventions  PCP Verified by CM: Yes (Dr. Lety Saldivar)  Mode of Transport at Discharge:  Other (see comment) (family to transport)  Health Maintenance Reviewed: Yes  Physical Therapy Consult: Yes  Support Systems: Spouse/Significant Other, Child(rohit)  Confirm Follow Up Transport: Self  Freedom of Choice List was Provided with Basic Dialogue that Supports the Patient's Individualized Plan of Care/Goals, Treatment Preferences and Shares the Quality Data Associated with the Providers?: Yes  Discharge Location  Discharge Placement:

## 2021-12-08 NOTE — PROCEDURES
Indication: Near respiratory arrest    Time out done and correct patient identified. Now using MAC BLADE 4. Noted vocal cords and patient intubated with size 8 ETT. Noted Change in CO2 detector. Patient with B/L breath sounds on auscultation. No sounds noted over abdomen. Patient tolerated procedure Well no complications. Lip line for ETT was 25 cm. CXR ordered. Estimated Blood loss: No blood loss    Ellis Spurling, MD    Electronically signed.

## 2021-12-08 NOTE — DEATH NOTE
Death Summary    Ora Suh  Admission date:  11/29/2021  Discharge date:      Admitting Diagnosis:  Acute respiratory failure with hypoxia ([facility] [J96.01]  Discharge Diagnosis:    Problem List as of 12/8/2021 Date Reviewed: 12/2/2021          Codes Class Noted - Resolved    * (Principal) Acute hypoxemic respiratory failure due to COVID-19 [facility]) ICD-10-CM: U07.1, J96.01  ICD-9-CM: 518.81, 079.89, 799.02  11/29/2021 - Present        Class 1 obesity due to excess calories with serious comorbidity and body mass index (BMI) of 32.0 to 32.9 in adult (Chronic) ICD-10-CM: E66.09, Z68.32  ICD-9-CM: 278.00, V85.32  11/29/2021 - Present        COVID-19 ICD-10-CM: U07.1  ICD-9-CM: 079.89  11/29/2021 - Present        Steroid-induced hyperglycemia ICD-10-CM: R73.9, T38.0X5A  ICD-9-CM: 790.29, E932.0  11/29/2021 - Present        HLD (hyperlipidemia) (Chronic) ICD-10-CM: E78.5  ICD-9-CM: 272.4  12/19/2012 - Present        HTN (hypertension) (Chronic) ICD-10-CM: I10  ICD-9-CM: 401.9  12/19/2012 - Present        CAD (coronary artery disease) (Chronic) ICD-10-CM: I25.10  ICD-9-CM: 414.00  12/19/2012 - Present        ED (erectile dysfunction) ICD-10-CM: N52.9  ICD-9-CM: 607.84  12/19/2012 - Present        GERD (gastroesophageal reflux disease) (Chronic) ICD-10-CM: K21.9  ICD-9-CM: 530.81  12/19/2012 - Present        RESOLVED: LEONCIO (acute kidney injury) ([facility] ICD-10-CM: N17.9  ICD-9-CM: 584.9  11/29/2021 - 12/4/2021        RESOLVED: Hypokalemia ICD-10-CM: E87.6  ICD-9-CM: 276.8  11/29/2021 - 12/4/2021        RESOLVED: Abdominal pain ICD-10-CM: R10.9  ICD-9-CM: 789.00  9/17/2018 - 1/26/2020        RESOLVED: Elevated liver enzymes ICD-10-CM: R74.8  ICD-9-CM: 790.5  9/17/2018 - 1/26/2020        RESOLVED: LEONCIO (acute kidney injury) ([facility] ICD-10-CM: N17.9  ICD-9-CM: 584.9  9/17/2018 - 1/26/2020        RESOLVED: Leukocytosis ICD-10-CM: U68.417  ICD-9-CM: 288.60  9/17/2018 - 1/26/2020        RESOLVED: Biliary obstruction ICD-10-CM: K83.1  ICD-9-CM: 576.2  9/17/2018 - 1/26/2020        RESOLVED: Cholangitis ICD-10-CM: K83.09  ICD-9-CM: 576.1  9/17/2018 - 1/26/2020        RESOLVED: Hyperbilirubinemia ICD-10-CM: E80.6  ICD-9-CM: 782.4  9/16/2018 - 1/26/2020        RESOLVED: Chronic calculous cholecystitis ICD-10-CM: K80.10  ICD-9-CM: 574.10  6/27/2018 - 1/26/2020    Overview Addendum 7/15/2018  9:15 AM by Zina Cheng MD     7/6/18 s/p lap taylor; Dr Colt Bassett: [address]; CHOLELITHIASIS. Electronically signed out on 7/8/2018 17:43 by Brant Lamb. Mayco Upton MD   Microscopic Description : Chronic inflammation is seen in the wall of the gallbladder. No dysplasia is identified. Dr. Lemmie Olszewski concurs              RESOLVED: Gallstones ICD-10-CM: K80.20  ICD-9-CM: 574.20  6/18/2018 - 7/23/2018              Consultants: Dietitian pulmonary critical care    Studies/Procedures: Chest x-rays    Hospital course:    Background: 69 y.o.  male, PMH HTN, HLD, GERD, and CAD seen and evaluated at the request of Dr. Sherman Anglin for worsening acute respiratory failure due to COVID 19.  The patient presented to the ED on 11/29/21 with c/o mild fever, shortness of breath, and back pain.  He was found to be COVID positive on 11/23/2021 after being tested at [facility].  The patient was noted to have an O2 sat of 85% on RA initially that improved on 4L NC.  However, the patient has continued to have increased oxygen needs and is now on AirVo 60L/95% with O2 sat 89-90%. Latosha Darting states he does not really want to be intubated and placed on the ventilator if at all possible but will consider if he continues decompensating.  He is not opposed to BIPAP if needed.  The patient retested positive for COVID on 11/27/2021, baricitinib was initiated on 11/30/21, last dose 12/13/21; dexamethasone 6 mg IV started on 11/30/21, Vibramycin and ceftriaxone started on 11/29/21.  He expresses desiring bronchoscopy if indicated.     Overnight patient had declined with the breathing status decision was made to put him on a breathing machine intubation was done without complications  Few minutes after intubation patient had bradycardia ACLS was initiated total of 5 codes were initiated with no complete recovery patient had tension pneumothorax most likely from chest compressions on the right chest tube was secured patient had 5 codes ACLS protocols initiated in the span of 1 hour and we stopped ACLS given the futility of care and he was not having ROSC care he passed away peacefully at 7:25 AM on December 8, 2021 wife is on the way she had an idea the patient was not doing too well and he had an arrest    Critical care time spent the care of this patient is 58 minutes. Final:  --Pronounced dead at 7:25 AM on December 8, 2021. --Total discharge greater than 30 minutes in duration.     Donald Guajardo MD

## 2021-12-08 NOTE — PROGRESS NOTES
Patient  with his family on their way to the hospital. His family members are grieving appropriately. A  group of family members were present following his death. .  provided empathy, comfort, a spiritual presence, emotional support and prayer.  attended two code blues for this patient and ministered to his family members when they arrived.         JESSA Campos

## 2021-12-08 NOTE — PROGRESS NOTES
Patient oxygen saturation dropped down to the 70's. Patient turned on side and even tried to prone patient. Oxygen continued to stay in the low 70's. Md and Resp.  called to intubated patient. During the intubation patient begin to indy down. One amp of EPI and Atropine given (0631). One amp Atropine (4873). CPR begun (0988) and one amp of EPI. Patient PEA (2338). Continue CPR. Two amps of Bicarb (8709). One amp of EPI (8403). ROSC (4155). Patient asystole. CPR begain and 1 amps of EPI (3351). Amp of Bicarb and resume CPR (7840). One amp of epi (9068). Pulse FFTFKN(1338). Increased Dop to 15 and Levo to 30.

## 2021-12-08 NOTE — DISCHARGE SUMMARY
DISCHARGE NOTE    Velvet Donald  Admission date:  11/29/2021  Discharge date: 7:25 AM  December 8, 2021    Admitting Diagnosis:  Acute respiratory failure with hypoxia [facility]) [J96.01]    Discharge Diagnoses:    Hospital Problems  Date Reviewed: 12/2/2021          Codes Class Noted POA    * (Principal) Acute hypoxemic respiratory failure due to COVID-19 [facility]) ICD-10-CM: U07.1, J96.01  ICD-9-CM: 518.81, 079.89, 799.02  11/29/2021 Yes        Class 1 obesity due to excess calories with serious comorbidity and body mass index (BMI) of 32.0 to 32.9 in adult (Chronic) ICD-10-CM: E66.09, Z68.32  ICD-9-CM: 278.00, V85.32  11/29/2021 Yes        COVID-19 ICD-10-CM: U07.1  ICD-9-CM: 079.89  11/29/2021 Yes        Steroid-induced hyperglycemia ICD-10-CM: R73.9, T38.0X5A  ICD-9-CM: 790.29, E932.0  11/29/2021 Yes        HLD (hyperlipidemia) (Chronic) ICD-10-CM: E78.5  ICD-9-CM: 272.4  12/19/2012 Yes        HTN (hypertension) (Chronic) ICD-10-CM: I10  ICD-9-CM: 401.9  12/19/2012 Yes        CAD (coronary artery disease) (Chronic) ICD-10-CM: I25.10  ICD-9-CM: 414.00  12/19/2012 Yes        GERD (gastroesophageal reflux disease) (Chronic) ICD-10-CM: K21.9  ICD-9-CM: 530.81  12/19/2012 Yes                        LAB  Recent Labs     12/08/21  0332 12/07/21  0342   WBC 25.5* 17.5*   HGB 14.1 13.2*   HCT 44.1 40.2*    166     Recent Labs     12/08/21  0332 12/07/21  0342    139   K 4.2 3.4*    101   CO2 28 32   BUN 45* 36*   CREA 0.94 0.91   MG  --  2.8*   PHOS  --  2.1*   CA 8.7 8.6     No results for input(s): PH, PCO2, PO2, HCO3 in the last 72 hours.     Discharge Medications:   Current Discharge Medication List          Consultants: Dietitian pulmonary critical care     Studies/Procedures: Chest x-rays     Hospital course:     Background: 69 y.o.  male, PMH HTN, HLD, GERD, and CAD seen and evaluated at the request of Dr. Sherman Anglin for worsening acute respiratory failure due to COVID 19.  The patient presented to the ED on 11/29/21 with c/o mild fever, shortness of breath, and back pain.  He was found to be COVID positive on 11/23/2021 after being tested at [facility].  The patient was noted to have an O2 sat of 85% on RA initially that improved on 4L NC.  However, the patient has continued to have increased oxygen needs and is now on AirVo 60L/95% with O2 sat 89-90%. Meliton Boone states he does not really want to be intubated and placed on the ventilator if at all possible but will consider if he continues decompensating.  He is not opposed to BIPAP if needed.  The patient retested positive for COVID on 11/27/2021, baricitinib was initiated on 11/30/21, last dose 12/13/21; dexamethasone 6 mg IV started on 11/30/21, Vibramycin and ceftriaxone started on 11/29/21.  He expresses desiring bronchoscopy if indicated.     Overnight patient had declined with the breathing status decision was made to put him on a breathing machine intubation was done without complications  Few minutes after intubation patient had bradycardia ACLS was initiated total of 5 codes were initiated with no complete recovery patient had tension pneumothorax most likely from chest compressions on the right chest tube was secured patient had 5 codes ACLS protocols initiated in the span of 1 hour and we stopped ACLS given the futility of care and he was not having ROSC care he passed away peacefully at 7:25 AM on December 8, 2021 wife is on the way she had an idea the patient was not doing too well and he had an arrest     Critical care time spent the care of this patient is 58 minutes. Final:  --Pronounced dead at 7:25 AM on December 8, 2021. --Total discharge greater than 30 minutes in duration. More than 50% of the time documented was spent in face-to-face contact with the patient and in the care of the patient on the floor/unit where the patient is located.     Joy Chandra MD

## 2021-12-08 NOTE — PROCEDURES
INDICATION:   pneumothorax RIGHT    Anesthesia:  1% lidocaine (10 ml)       Time out done and correct patient identified and correct incision site identified. Right chest sterilized. Procedure over-the-wire technique anterior chest wall second intercostal space by anatomical landmark mid axillary line area was prepped and draped in sterile fashion area was introduced into the chest cavity with return of air and his wire introduced dilator introduced and chest tube was applied with elbow plank area no complications  14  [place] Chest tube place in right chest using sterile techniques with return of air. An Atrium was attached to the chest tube, and suction at -20cm H2O applied. Air was seen gushing through the water seal.  Regular sterile suture was done    Patient tolerated procedure well, no complications  EBL --less than 5 ml. No samples sent    CXR pending. Víctor Wiseman MD

## 2021-12-23 NOTE — PROGRESS NOTES
Requested to answer Levon De Jesus about the patient being on mechanical ventilation. It was documented that the patient was intubated and placed on a breathing machine. This indicates that the patient was placed on mechanical ventilation.      Yassine Aceves MD

## 2021-12-23 NOTE — PROGRESS NOTES
Physician Progress Note      Tahira Willoughby  [facility] #:                  [id number]  :                       1950  ADMIT DATE:       2021 3:01 PM  [address] [place] DATE:        2021 7:27 AM  RESPONDING  PROVIDER #:        Donta Boo MD          QUERY TEXT:    Patient admitted with Respiratory Failure due to COVID. Noted documentation of Intubation on , however, no records of mechanical ventilation was noted on  the chart. In order to support Diagnosis of Mechanical ventilation , please include additional clinical indicators(the time of initiation and termination )  in your documentation. Or please document if the diagnosis of mechanical ventilation  has been ruled out after further study. The medical record reflects the following:  Risk Factors: Acute Hypoxic Respiratory Failure  Clinical Indicators:  procedure  0725:Near respiratory arrest , intubated, few minutes after intubation had bradycardia ACLS initiated in the span of 1 hour. He peacefully passed away on  at 07:25 per Discharge Summary    Treatment: Initially was on O2 at 4 L      Thank you. Milton Noel RN CDI, CCS  My office phone [phone]  Options provided:  -- Mechanical ventilation  present as evidenced by, Please document evidence, time of initiation and termination. -- Mechanical ventilation was ruled out  -- Other - I will add my own diagnosis  -- Disagree - Not applicable / Not valid  -- Disagree - Clinically unable to determine / Unknown  -- Refer to Clinical Documentation Reviewer    PROVIDER RESPONSE TEXT:    Mechanical ventilation is present as evidenced by Dr Darlin Hyatt states in his discharge summary that the patient was put on a breathing machine following intubation. This is mechanical ventilation. In addition, every time someone is intubated they are placed on mechanical ventilator.     Query created by: Jaiden Barajas on 12/15/2021 2:56 PM      Electronically signed by:   Sonja Sr MD 12/23/2021 9:58 AM

## (undated) DEVICE — REM POLYHESIVE ADULT PATIENT RETURN ELECTRODE: Brand: VALLEYLAB

## (undated) DEVICE — SOL ANTI-FOG 6ML MEDC -- MEDICHOICE - CONVERT TO 358427

## (undated) DEVICE — SPHINCTEROTOME: Brand: JAGTOME RX 39

## (undated) DEVICE — DEVICE LCK BILI RAP EXCHG OLPS --

## (undated) DEVICE — NDL PRT INJ NSAF BLNT 18GX1.5 --

## (undated) DEVICE — SOLUTION IV 1000ML 0.9% SOD CHL

## (undated) DEVICE — BLADELESS OPTICAL TROCAR WITH FIXATION CANNULA: Brand: VERSAPORT

## (undated) DEVICE — STANDARD HYPODERMIC NEEDLE,POLYPROPYLENE HUB: Brand: MONOJECT

## (undated) DEVICE — SLIM BODY SKIN STAPLER: Brand: APPOSE ULC

## (undated) DEVICE — 3M™ TEGADERM™ TRANSPARENT FILM DRESSING FRAME STYLE, 1624W, 2-3/8 IN X 2-3/4 IN (6 CM X 7 CM), 100/CT 4CT/CASE: Brand: 3M™ TEGADERM™

## (undated) DEVICE — UNIVERSAL FIXATION CANNULA: Brand: VERSAONE

## (undated) DEVICE — LOGICUT SCISSOR LENGTH 320MM: Brand: LOGI - LAPAROSCOPIC INSTRUMENT SYSTEM

## (undated) DEVICE — CLIP APPLIER: Brand: ENDO CLIP II

## (undated) DEVICE — ADULT SPO2 SENSOR: Brand: NELLCOR

## (undated) DEVICE — KENDALL RADIOLUCENT FOAM MONITORING ELECTRODE RECTANGULAR SHAPE: Brand: KENDALL

## (undated) DEVICE — (D)SYR 10ML 1/5ML GRAD NSAF -- PKGING CHANGE USE ITEM 338027

## (undated) DEVICE — LAP CHOLE: Brand: MEDLINE INDUSTRIES, INC.

## (undated) DEVICE — APPLIER LIG CLP L13IN 10MM PSTL GRP CONTAIN 15 TI L CLP

## (undated) DEVICE — SYR 3ML LL TIP 1/10ML GRAD --

## (undated) DEVICE — BLOCK BITE AD 60FR W/ VELC STRP ADDRESSES MOST PT AND

## (undated) DEVICE — RETRIEVAL BALLOON CATHETER: Brand: EXTRACTOR™ PRO RX

## (undated) DEVICE — [HIGH FLOW INSUFFLATOR,  DO NOT USE IF PACKAGE IS DAMAGED,  KEEP DRY,  KEEP AWAY FROM SUNLIGHT,  PROTECT FROM HEAT AND RADIOACTIVE SOURCES.]: Brand: PNEUMOSURE

## (undated) DEVICE — TROCARS: Brand: KII® BLUNT TIP ACCESS SYSTEM

## (undated) DEVICE — SUTURE SZ 0 27IN 5/8 CIR UR-6  TAPER PT VIOLET ABSRB VICRYL J603H

## (undated) DEVICE — SYR 5ML 1/5 GRAD LL NSAF LF --

## (undated) DEVICE — KENDALL SCD EXPRESS SLEEVES, KNEE LENGTH, MEDIUM: Brand: KENDALL SCD

## (undated) DEVICE — CONTAINER SPEC FRMLN 120ML --

## (undated) DEVICE — CANNULA NSL ORAL AD FOR CAPNOFLEX CO2 O2 AIRLFE

## (undated) DEVICE — 2, DISPOSABLE SUCTION/IRRIGATOR WITHOUT DISPOSABLE TIP: Brand: STRYKEFLOW

## (undated) DEVICE — AMD ANTIMICROBIAL NON-ADHERENT PAD,0.2% POLYHEXAMETHYLENE BIGUANIDE HCI (PHMB): Brand: TELFA

## (undated) DEVICE — 2000CC GUARDIAN II: Brand: GUARDIAN

## (undated) DEVICE — TRAY PREP DRY W/ PREM GLV 2 APPL 6 SPNG 2 UNDPD 1 OVERWRAP